# Patient Record
Sex: MALE | Race: WHITE | NOT HISPANIC OR LATINO | Employment: OTHER | URBAN - METROPOLITAN AREA
[De-identification: names, ages, dates, MRNs, and addresses within clinical notes are randomized per-mention and may not be internally consistent; named-entity substitution may affect disease eponyms.]

---

## 2018-06-19 PROBLEM — F10.90 ALCOHOL USE DISORDER: Status: ACTIVE | Noted: 2018-06-19

## 2018-06-19 PROBLEM — F17.200 CURRENT EVERY DAY SMOKER: Status: ACTIVE | Noted: 2018-06-19

## 2018-06-19 PROBLEM — N52.9 ERECTILE DYSFUNCTION: Status: ACTIVE | Noted: 2018-06-19

## 2018-06-19 PROBLEM — R03.0 PREHYPERTENSION: Status: ACTIVE | Noted: 2018-06-19

## 2018-06-19 PROBLEM — R35.1 NOCTURIA: Status: ACTIVE | Noted: 2018-06-19

## 2018-07-24 PROBLEM — R35.1 NOCTURIA: Status: RESOLVED | Noted: 2018-06-19 | Resolved: 2018-07-24

## 2018-07-24 PROBLEM — I10 ESSENTIAL HYPERTENSION: Status: ACTIVE | Noted: 2018-06-19

## 2018-09-06 PROBLEM — E83.52 HYPERCALCEMIA: Status: ACTIVE | Noted: 2018-09-06

## 2018-09-06 PROBLEM — E78.2 MIXED HYPERLIPIDEMIA: Status: ACTIVE | Noted: 2018-09-06

## 2018-09-06 PROBLEM — D75.1 POLYCYTHEMIA: Status: ACTIVE | Noted: 2018-09-06

## 2020-08-13 PROBLEM — F10.20 ALCOHOL USE DISORDER, MODERATE, IN CONTROLLED ENVIRONMENT (HCC): Status: ACTIVE | Noted: 2018-06-19

## 2020-08-21 PROBLEM — E83.52 HYPERCALCEMIA: Status: RESOLVED | Noted: 2018-09-06 | Resolved: 2020-08-21

## 2021-03-12 ENCOUNTER — HOSPITAL ENCOUNTER (OUTPATIENT)
Dept: RADIOLOGY | Facility: MEDICAL CENTER | Age: 61
End: 2021-03-12
Payer: OTHER MISCELLANEOUS

## 2021-03-12 ENCOUNTER — HOSPITAL ENCOUNTER (INPATIENT)
Facility: MEDICAL CENTER | Age: 61
LOS: 8 days | DRG: 253 | End: 2021-03-20
Attending: EMERGENCY MEDICINE | Admitting: HOSPITALIST
Payer: OTHER MISCELLANEOUS

## 2021-03-12 DIAGNOSIS — I70.90 ARTERIAL OCCLUSION: Primary | ICD-10-CM

## 2021-03-12 DIAGNOSIS — M79.673 CHRONIC FOOT PAIN, UNSPECIFIED LATERALITY: ICD-10-CM

## 2021-03-12 DIAGNOSIS — S91.332A PENETRATING WOUND OF LEFT FOOT, INITIAL ENCOUNTER: ICD-10-CM

## 2021-03-12 DIAGNOSIS — G89.29 CHRONIC FOOT PAIN, UNSPECIFIED LATERALITY: ICD-10-CM

## 2021-03-12 PROBLEM — I70.209 FEMORAL ARTERY OCCLUSION (HCC): Status: ACTIVE | Noted: 2021-03-12

## 2021-03-12 LAB
ALBUMIN SERPL BCP-MCNC: 3.3 G/DL (ref 3.2–4.9)
ALBUMIN/GLOB SERPL: 1 G/DL
ALP SERPL-CCNC: 79 U/L (ref 30–99)
ALT SERPL-CCNC: 14 U/L (ref 2–50)
ANION GAP SERPL CALC-SCNC: 8 MMOL/L (ref 7–16)
APTT PPP: 96.5 SEC (ref 24.7–36)
AST SERPL-CCNC: 17 U/L (ref 12–45)
BASOPHILS # BLD AUTO: 0.7 % (ref 0–1.8)
BASOPHILS # BLD: 0.07 K/UL (ref 0–0.12)
BILIRUB SERPL-MCNC: 0.6 MG/DL (ref 0.1–1.5)
BUN SERPL-MCNC: 9 MG/DL (ref 8–22)
CALCIUM SERPL-MCNC: 10.2 MG/DL (ref 8.5–10.5)
CHLORIDE SERPL-SCNC: 101 MMOL/L (ref 96–112)
CO2 SERPL-SCNC: 25 MMOL/L (ref 20–33)
CREAT SERPL-MCNC: 0.58 MG/DL (ref 0.5–1.4)
EKG IMPRESSION: NORMAL
EOSINOPHIL # BLD AUTO: 0.14 K/UL (ref 0–0.51)
EOSINOPHIL NFR BLD: 1.3 % (ref 0–6.9)
ERYTHROCYTE [DISTWIDTH] IN BLOOD BY AUTOMATED COUNT: 45.9 FL (ref 35.9–50)
GLOBULIN SER CALC-MCNC: 3.2 G/DL (ref 1.9–3.5)
GLUCOSE SERPL-MCNC: 97 MG/DL (ref 65–99)
HCT VFR BLD AUTO: 49.7 % (ref 42–52)
HGB BLD-MCNC: 16.3 G/DL (ref 14–18)
IMM GRANULOCYTES # BLD AUTO: 0.13 K/UL (ref 0–0.11)
IMM GRANULOCYTES NFR BLD AUTO: 1.2 % (ref 0–0.9)
INR PPP: 0.98 (ref 0.87–1.13)
LYMPHOCYTES # BLD AUTO: 1.94 K/UL (ref 1–4.8)
LYMPHOCYTES NFR BLD: 18.3 % (ref 22–41)
MCH RBC QN AUTO: 31.2 PG (ref 27–33)
MCHC RBC AUTO-ENTMCNC: 32.8 G/DL (ref 33.7–35.3)
MCV RBC AUTO: 95 FL (ref 81.4–97.8)
MONOCYTES # BLD AUTO: 0.98 K/UL (ref 0–0.85)
MONOCYTES NFR BLD AUTO: 9.2 % (ref 0–13.4)
NEUTROPHILS # BLD AUTO: 7.35 K/UL (ref 1.82–7.42)
NEUTROPHILS NFR BLD: 69.3 % (ref 44–72)
NRBC # BLD AUTO: 0 K/UL
NRBC BLD-RTO: 0 /100 WBC
PLATELET # BLD AUTO: 297 K/UL (ref 164–446)
PMV BLD AUTO: 9.4 FL (ref 9–12.9)
POTASSIUM SERPL-SCNC: 4.2 MMOL/L (ref 3.6–5.5)
PROT SERPL-MCNC: 6.5 G/DL (ref 6–8.2)
PROTHROMBIN TIME: 13.3 SEC (ref 12–14.6)
RBC # BLD AUTO: 5.23 M/UL (ref 4.7–6.1)
SARS-COV+SARS-COV-2 AG RESP QL IA.RAPID: NOTDETECTED
SARS-COV-2 RNA RESP QL NAA+PROBE: NOTDETECTED
SODIUM SERPL-SCNC: 134 MMOL/L (ref 135–145)
SPECIMEN SOURCE: NORMAL
SPECIMEN SOURCE: NORMAL
UFH PPP CHRO-ACNC: 0.25 IU/ML
UFH PPP CHRO-ACNC: 0.46 IU/ML
WBC # BLD AUTO: 10.6 K/UL (ref 4.8–10.8)

## 2021-03-12 PROCEDURE — U0005 INFEC AGEN DETEC AMPLI PROBE: HCPCS

## 2021-03-12 PROCEDURE — 700111 HCHG RX REV CODE 636 W/ 250 OVERRIDE (IP): Performed by: EMERGENCY MEDICINE

## 2021-03-12 PROCEDURE — 36415 COLL VENOUS BLD VENIPUNCTURE: CPT

## 2021-03-12 PROCEDURE — 85520 HEPARIN ASSAY: CPT | Mod: 91

## 2021-03-12 PROCEDURE — A9270 NON-COVERED ITEM OR SERVICE: HCPCS | Performed by: HOSPITALIST

## 2021-03-12 PROCEDURE — 87426 SARSCOV CORONAVIRUS AG IA: CPT

## 2021-03-12 PROCEDURE — C9803 HOPD COVID-19 SPEC COLLECT: HCPCS | Performed by: EMERGENCY MEDICINE

## 2021-03-12 PROCEDURE — 96365 THER/PROPH/DIAG IV INF INIT: CPT

## 2021-03-12 PROCEDURE — 700105 HCHG RX REV CODE 258: Performed by: HOSPITALIST

## 2021-03-12 PROCEDURE — 93005 ELECTROCARDIOGRAM TRACING: CPT | Performed by: EMERGENCY MEDICINE

## 2021-03-12 PROCEDURE — 99223 1ST HOSP IP/OBS HIGH 75: CPT | Performed by: HOSPITALIST

## 2021-03-12 PROCEDURE — 99285 EMERGENCY DEPT VISIT HI MDM: CPT

## 2021-03-12 PROCEDURE — 85730 THROMBOPLASTIN TIME PARTIAL: CPT

## 2021-03-12 PROCEDURE — 80053 COMPREHEN METABOLIC PANEL: CPT

## 2021-03-12 PROCEDURE — 96366 THER/PROPH/DIAG IV INF ADDON: CPT

## 2021-03-12 PROCEDURE — 770006 HCHG ROOM/CARE - MED/SURG/GYN SEMI*

## 2021-03-12 PROCEDURE — 85610 PROTHROMBIN TIME: CPT

## 2021-03-12 PROCEDURE — 85025 COMPLETE CBC W/AUTO DIFF WBC: CPT

## 2021-03-12 PROCEDURE — U0003 INFECTIOUS AGENT DETECTION BY NUCLEIC ACID (DNA OR RNA); SEVERE ACUTE RESPIRATORY SYNDROME CORONAVIRUS 2 (SARS-COV-2) (CORONAVIRUS DISEASE [COVID-19]), AMPLIFIED PROBE TECHNIQUE, MAKING USE OF HIGH THROUGHPUT TECHNOLOGIES AS DESCRIBED BY CMS-2020-01-R: HCPCS

## 2021-03-12 PROCEDURE — 700102 HCHG RX REV CODE 250 W/ 637 OVERRIDE(OP): Performed by: HOSPITALIST

## 2021-03-12 RX ORDER — OXYCODONE HYDROCHLORIDE 5 MG/1
5 TABLET ORAL
Status: DISCONTINUED | OUTPATIENT
Start: 2021-03-12 | End: 2021-03-13

## 2021-03-12 RX ORDER — POLYETHYLENE GLYCOL 3350 17 G/17G
1 POWDER, FOR SOLUTION ORAL
Status: DISCONTINUED | OUTPATIENT
Start: 2021-03-12 | End: 2021-03-17

## 2021-03-12 RX ORDER — BISACODYL 10 MG
10 SUPPOSITORY, RECTAL RECTAL
Status: DISCONTINUED | OUTPATIENT
Start: 2021-03-12 | End: 2021-03-17

## 2021-03-12 RX ORDER — OXYCODONE HYDROCHLORIDE 10 MG/1
10 TABLET ORAL
Status: DISCONTINUED | OUTPATIENT
Start: 2021-03-12 | End: 2021-03-13

## 2021-03-12 RX ORDER — ACETAMINOPHEN 325 MG/1
650 TABLET ORAL EVERY 6 HOURS PRN
Status: DISCONTINUED | OUTPATIENT
Start: 2021-03-12 | End: 2021-03-13

## 2021-03-12 RX ORDER — NICOTINE 21 MG/24HR
21 PATCH, TRANSDERMAL 24 HOURS TRANSDERMAL
Status: DISCONTINUED | OUTPATIENT
Start: 2021-03-12 | End: 2021-03-20 | Stop reason: HOSPADM

## 2021-03-12 RX ORDER — HEPARIN SODIUM 1000 [USP'U]/ML
40 INJECTION, SOLUTION INTRAVENOUS; SUBCUTANEOUS PRN
Status: DISCONTINUED | OUTPATIENT
Start: 2021-03-12 | End: 2021-03-13

## 2021-03-12 RX ORDER — HEPARIN SODIUM 5000 [USP'U]/100ML
0-30 INJECTION, SOLUTION INTRAVENOUS CONTINUOUS
Status: DISCONTINUED | OUTPATIENT
Start: 2021-03-12 | End: 2021-03-13

## 2021-03-12 RX ORDER — CEPHALEXIN 500 MG/1
500 CAPSULE ORAL 4 TIMES DAILY
Status: ON HOLD | COMMUNITY
End: 2021-03-20

## 2021-03-12 RX ORDER — NAPROXEN SODIUM 220 MG
440 TABLET ORAL 2 TIMES DAILY PRN
COMMUNITY
End: 2021-09-15

## 2021-03-12 RX ORDER — PROMETHAZINE HYDROCHLORIDE 25 MG/1
12.5-25 TABLET ORAL EVERY 4 HOURS PRN
Status: DISCONTINUED | OUTPATIENT
Start: 2021-03-12 | End: 2021-03-20 | Stop reason: HOSPADM

## 2021-03-12 RX ORDER — PROCHLORPERAZINE EDISYLATE 5 MG/ML
5-10 INJECTION INTRAMUSCULAR; INTRAVENOUS EVERY 4 HOURS PRN
Status: DISCONTINUED | OUTPATIENT
Start: 2021-03-12 | End: 2021-03-20 | Stop reason: HOSPADM

## 2021-03-12 RX ORDER — MORPHINE SULFATE 4 MG/ML
4 INJECTION, SOLUTION INTRAMUSCULAR; INTRAVENOUS
Status: DISCONTINUED | OUTPATIENT
Start: 2021-03-12 | End: 2021-03-13

## 2021-03-12 RX ORDER — LISINOPRIL 10 MG/1
10 TABLET ORAL
Status: DISCONTINUED | OUTPATIENT
Start: 2021-03-12 | End: 2021-03-12

## 2021-03-12 RX ORDER — SODIUM CHLORIDE, SODIUM LACTATE, POTASSIUM CHLORIDE, CALCIUM CHLORIDE 600; 310; 30; 20 MG/100ML; MG/100ML; MG/100ML; MG/100ML
INJECTION, SOLUTION INTRAVENOUS CONTINUOUS
Status: DISCONTINUED | OUTPATIENT
Start: 2021-03-12 | End: 2021-03-20

## 2021-03-12 RX ORDER — IBUPROFEN 200 MG
600-800 TABLET ORAL EVERY 6 HOURS PRN
COMMUNITY
End: 2021-09-15 | Stop reason: SDUPTHER

## 2021-03-12 RX ORDER — ONDANSETRON 4 MG/1
4 TABLET, ORALLY DISINTEGRATING ORAL EVERY 4 HOURS PRN
Status: DISCONTINUED | OUTPATIENT
Start: 2021-03-12 | End: 2021-03-20 | Stop reason: HOSPADM

## 2021-03-12 RX ORDER — AMOXICILLIN 250 MG
2 CAPSULE ORAL 2 TIMES DAILY
Status: DISCONTINUED | OUTPATIENT
Start: 2021-03-13 | End: 2021-03-17

## 2021-03-12 RX ORDER — ONDANSETRON 2 MG/ML
4 INJECTION INTRAMUSCULAR; INTRAVENOUS EVERY 4 HOURS PRN
Status: DISCONTINUED | OUTPATIENT
Start: 2021-03-12 | End: 2021-03-13

## 2021-03-12 RX ORDER — PROMETHAZINE HYDROCHLORIDE 25 MG/1
12.5-25 SUPPOSITORY RECTAL EVERY 4 HOURS PRN
Status: DISCONTINUED | OUTPATIENT
Start: 2021-03-12 | End: 2021-03-20 | Stop reason: HOSPADM

## 2021-03-12 RX ORDER — LABETALOL HYDROCHLORIDE 5 MG/ML
10 INJECTION, SOLUTION INTRAVENOUS EVERY 4 HOURS PRN
Status: DISCONTINUED | OUTPATIENT
Start: 2021-03-12 | End: 2021-03-20 | Stop reason: HOSPADM

## 2021-03-12 RX ORDER — MULTIVIT WITH MINERALS/LUTEIN
1 TABLET ORAL DAILY
Status: DISCONTINUED | OUTPATIENT
Start: 2021-03-12 | End: 2021-03-12

## 2021-03-12 RX ORDER — GABAPENTIN 300 MG/1
300 CAPSULE ORAL
Status: DISCONTINUED | OUTPATIENT
Start: 2021-03-12 | End: 2021-03-18

## 2021-03-12 RX ADMIN — OXYCODONE HYDROCHLORIDE 10 MG: 10 TABLET ORAL at 20:04

## 2021-03-12 RX ADMIN — SODIUM CHLORIDE, POTASSIUM CHLORIDE, SODIUM LACTATE AND CALCIUM CHLORIDE: 600; 310; 30; 20 INJECTION, SOLUTION INTRAVENOUS at 17:58

## 2021-03-12 RX ADMIN — HEPARIN SODIUM 3900 UNITS: 1000 INJECTION, SOLUTION INTRAVENOUS; SUBCUTANEOUS at 22:41

## 2021-03-12 RX ADMIN — GABAPENTIN 300 MG: 300 CAPSULE ORAL at 20:04

## 2021-03-12 RX ADMIN — ACETAMINOPHEN 650 MG: 325 TABLET, FILM COATED ORAL at 20:04

## 2021-03-12 RX ADMIN — HEPARIN SODIUM 18 UNITS/KG/HR: 5000 INJECTION, SOLUTION INTRAVENOUS at 15:34

## 2021-03-12 RX ADMIN — OXYCODONE 5 MG: 5 TABLET ORAL at 17:06

## 2021-03-12 ASSESSMENT — LIFESTYLE VARIABLES
TOTAL SCORE: 0
HAVE PEOPLE ANNOYED YOU BY CRITICIZING YOUR DRINKING: NO
DOES PATIENT WANT TO STOP DRINKING: NO
HAVE YOU EVER FELT YOU SHOULD CUT DOWN ON YOUR DRINKING: NO
TOTAL SCORE: 0
TOTAL SCORE: 0
HOW MANY TIMES IN THE PAST YEAR HAVE YOU HAD 5 OR MORE DRINKS IN A DAY: 25
ALCOHOL_USE: YES
ON A TYPICAL DAY WHEN YOU DRINK ALCOHOL HOW MANY DRINKS DO YOU HAVE: 4
CONSUMPTION TOTAL: POSITIVE
AVERAGE NUMBER OF DAYS PER WEEK YOU HAVE A DRINK CONTAINING ALCOHOL: 5
EVER FELT BAD OR GUILTY ABOUT YOUR DRINKING: NO
EVER HAD A DRINK FIRST THING IN THE MORNING TO STEADY YOUR NERVES TO GET RID OF A HANGOVER: NO

## 2021-03-12 ASSESSMENT — ENCOUNTER SYMPTOMS
ABDOMINAL PAIN: 0
CHILLS: 0
FEVER: 0
BLURRED VISION: 0
DIARRHEA: 0
VOMITING: 0
PALPITATIONS: 0
COUGH: 0
HEADACHES: 0
SHORTNESS OF BREATH: 0
NAUSEA: 0
LOSS OF CONSCIOUSNESS: 0
BACK PAIN: 0
SORE THROAT: 0
DOUBLE VISION: 0
DIZZINESS: 0

## 2021-03-12 ASSESSMENT — COGNITIVE AND FUNCTIONAL STATUS - GENERAL
DAILY ACTIVITIY SCORE: 18
DRESSING REGULAR LOWER BODY CLOTHING: A LITTLE
CLIMB 3 TO 5 STEPS WITH RAILING: A LITTLE
MOVING TO AND FROM BED TO CHAIR: A LITTLE
MOBILITY SCORE: 18
SUGGESTED CMS G CODE MODIFIER MOBILITY: CK
PERSONAL GROOMING: A LITTLE
HELP NEEDED FOR BATHING: A LITTLE
DRESSING REGULAR UPPER BODY CLOTHING: A LITTLE
EATING MEALS: A LITTLE
TOILETING: A LITTLE
TURNING FROM BACK TO SIDE WHILE IN FLAT BAD: A LITTLE
MOVING FROM LYING ON BACK TO SITTING ON SIDE OF FLAT BED: A LITTLE
SUGGESTED CMS G CODE MODIFIER DAILY ACTIVITY: CK
WALKING IN HOSPITAL ROOM: A LITTLE
STANDING UP FROM CHAIR USING ARMS: A LITTLE

## 2021-03-12 ASSESSMENT — PAIN DESCRIPTION - PAIN TYPE: TYPE: ACUTE PAIN

## 2021-03-12 ASSESSMENT — PATIENT HEALTH QUESTIONNAIRE - PHQ9
1. LITTLE INTEREST OR PLEASURE IN DOING THINGS: NOT AT ALL
2. FEELING DOWN, DEPRESSED, IRRITABLE, OR HOPELESS: NOT AT ALL
SUM OF ALL RESPONSES TO PHQ9 QUESTIONS 1 AND 2: 0

## 2021-03-12 ASSESSMENT — FIBROSIS 4 INDEX: FIB4 SCORE: 1.11

## 2021-03-12 NOTE — ED PROVIDER NOTES
ED Provider Note    CHIEF COMPLAINT  Chief Complaint   Patient presents with   • Leg Swelling     Transfer from [facility] for L LE clot.  Leg is discolored and swollen x 1 week, pain with palpation.  U/S showed L arterial occlusion Arrived on heparin gtt.   • Open Wound     L lateral foot 1x1 wound, macerated edges.       FAUSTINO Cantu is a 60 y.o. male who presents with a leg ischemia.  The patient is for started having some discomfort about a week and a half ago.  It was sore, uncomfortable, is gotten progressively more painful, swollen, discolored, have all gotten worse as time is gone on.  He went to the outside hospital, there he was found to have on sonogram occlusion of the posterior tibial, superficial femoral and popliteal arteries.  He was started on heparin and transferred here for vascular surgery consultation.  He denies any cough or cold symptoms.  No exposure to sick contact specifically COVID-19.  There is no other complaint.    PAST MEDICAL HISTORY  Past Medical History:   Diagnosis Date   • Adenomatous colon polyp 11/2018    multiple, recall colonoscopy in 3 years   • BPH with obstruction/lower urinary tract symptoms    • Cervical spine fracture (HCC)    • Chronic bronchitis (HCC)    • Current every day smoker 6/19/2018   • Erectile dysfunction 6/19/2018   • Hammer toe    • Herpes    • Hypertension    • Insomnia    • Mixed hyperlipidemia 9/6/2018   • Polycythemia 9/6/2018   • Prehypertension 6/19/2018   • Seasonal allergic rhinitis    • Sleep apnea        FAMILY HISTORY  Family History   Problem Relation Age of Onset   • Arthritis Other    • Diabetes Mother    • Cancer Father         colon or prostate    • Lung Disease Father         emphysema    • Diabetes Sister    • Diabetes Brother    • Diabetes Maternal Grandmother    • Diabetes Paternal Grandfather    • No Known Problems Daughter    • No Known Problems Daughter    • No Known Problems Grandchild    • No Known Problems Grandchild        SOCIAL  "HISTORY  Social History     Tobacco Use   • Smoking status: Current Every Day Smoker     Packs/day: 1.00     Years: 45.00     Pack years: 45.00     Types: Cigarettes   • Smokeless tobacco: Never Used   • Tobacco comment: 20 cig/day   Substance Use Topics   • Alcohol use: Yes     Alcohol/week: 3.0 - 3.6 oz     Types: 5 - 6 Cans of beer per week     Comment: 1-6 beers/day    • Drug use: Yes     Types: Marijuana, Inhaled     Comment: Occ         SURGICAL HISTORY  Past Surgical History:   Procedure Laterality Date   • HAMMERTOE CORRECTION         CURRENT MEDICATIONS  Home Medications     Reviewed by Christine Abdalla R.N. (Registered Nurse) on 03/12/21 at 1513  Med List Status: Partial   Medication Last Dose Status   gabapentin (NEURONTIN) 300 MG Cap 3/12/2021 Active   MAGNESIUM PO  Active   Misc Natural Products (PROSTATE) Cap  Active   Multiple Vitamins-Minerals (CENTRUM SILVER PO)  Active   Multiple Vitamins-Minerals (ZINC PO)  Active   tadalafil (CIALIS) 20 MG tablet  Active   Turmeric 450 MG Cap  Active                I have reviewed the nurses notes and/or the list brought with the patient.    ALLERGIES  No Known Allergies    REVIEW OF SYSTEMS  See HPI for further details. Review of systems as above, otherwise all other systems are negative.     PHYSICAL EXAM  VITAL SIGNS: /76   Pulse 67   Temp 37 °C (98.6 °F) (Temporal)   Resp 16   Ht 1.956 m (6' 5\")   Wt 97.5 kg (215 lb)   SpO2 94%   BMI 25.50 kg/m²     Constitutional: Well appearing patient in no acute distress.  Not toxic, nor ill in appearance.  HENT: Mucus membranes moist.  Oropharynx is clear.  Eyes: Pupils equally round.  No scleral icterus.   Neck: Full nontender range of motion.  Lymphatic: No cervical lymphadenopathy noted.   Cardiovascular: Regular heart rate and rhythm.  No murmurs, rubs, nor gallop appreciated.   Thorax & Lungs: Chest is nontender.  Lungs are clear to auscultation with good air movement bilaterally.  No wheeze, rhonchi, " nor rales.   Abdomen: Soft, with no tenderness, rebound nor guarding.  No mass, pulsatile mass, nor hepatosplenomegaly appreciated.  Skin: No purpura nor petechia noted.  See below  Extremities/Musculoskeletal: The left foot is cold to the touch, edematous and violaceous.  In particular he has a deeper violaceous coloration of the great toe as well as the fourth and fifth toes.  I do not feel a pulse.  Neurologic: Alert & oriented.  Strength and sensation is intact all around.  Gait is not assessed  Psychiatric: Normal affect appropriate for the clinical situation.     EKG  I interpreted this EKG myself.  This is a 12-lead study.  The rhythm is sinus with a rate of 66.  There are no ST segment nor T wave abnormalities.  Interpretation: No ST segment elevation myocardial infarction.    LABS  Labs were ordered    RADIOLOGY/PROCEDURES  Noted above    MEDICAL RECORD  I have reviewed patient's medical record and pertinent results are listed above.    COURSE & MEDICAL DECISION MAKING  I have reviewed any medical record information, laboratory studies and radiographic results as noted above.  This patient presents with a week and a half of what appears to be arterial occlusion in the left lower extremity.  He was started on heparin, we will continue that.  CT scan at the outside facility was performed, I am getting that loaded up so that our vascular surgeon, Dr. Cameron, with whom I have spoken will be able to review it to help guide management.  In the interim, I spoke with Dr. Frazier, who be seeing him to admit.  We discussed smoking cessation.  I offered him a nicotine patch which he has declined at this point.      FINAL IMPRESSION  1. Arterial occlusion           This dictation was created using voice recognition software.    Electronically signed by: Elio Mitchell M.D., 3/12/2021 3:48 PM

## 2021-03-12 NOTE — ED TRIAGE NOTES
Chief Complaint   Patient presents with   • Leg Swelling     Transfer from [facility] for L LE clot.  Leg is discolored and swollen x 1 week, pain with palpation.  U/S showed L arterial occlusion Arrived on heparin gtt.   • Open Wound     L lateral foot 1x1 wound, macerated edges.     BIBA to room.  Agree with triage assessment.  Changing into gown.  Chart placed for ERP eval.

## 2021-03-13 ENCOUNTER — APPOINTMENT (OUTPATIENT)
Dept: RADIOLOGY | Facility: MEDICAL CENTER | Age: 61
DRG: 253 | End: 2021-03-13
Attending: SURGERY
Payer: OTHER MISCELLANEOUS

## 2021-03-13 ENCOUNTER — ANESTHESIA EVENT (OUTPATIENT)
Dept: SURGERY | Facility: MEDICAL CENTER | Age: 61
DRG: 253 | End: 2021-03-13
Payer: OTHER MISCELLANEOUS

## 2021-03-13 LAB
ABO + RH BLD: NORMAL
ABO GROUP BLD: NORMAL
ANION GAP SERPL CALC-SCNC: 12 MMOL/L (ref 7–16)
BLD GP AB SCN SERPL QL: NORMAL
BUN SERPL-MCNC: 9 MG/DL (ref 8–22)
CALCIUM SERPL-MCNC: 10.1 MG/DL (ref 8.5–10.5)
CHLORIDE SERPL-SCNC: 98 MMOL/L (ref 96–112)
CHOLEST SERPL-MCNC: 153 MG/DL (ref 100–199)
CO2 SERPL-SCNC: 25 MMOL/L (ref 20–33)
CREAT SERPL-MCNC: 0.57 MG/DL (ref 0.5–1.4)
ERYTHROCYTE [DISTWIDTH] IN BLOOD BY AUTOMATED COUNT: 45 FL (ref 35.9–50)
GLUCOSE SERPL-MCNC: 100 MG/DL (ref 65–99)
HCT VFR BLD AUTO: 51 % (ref 42–52)
HDLC SERPL-MCNC: 48 MG/DL
HGB BLD-MCNC: 16.7 G/DL (ref 14–18)
LDLC SERPL CALC-MCNC: 88 MG/DL
MCH RBC QN AUTO: 30.8 PG (ref 27–33)
MCHC RBC AUTO-ENTMCNC: 32.7 G/DL (ref 33.7–35.3)
MCV RBC AUTO: 93.9 FL (ref 81.4–97.8)
PLATELET # BLD AUTO: 267 K/UL (ref 164–446)
PMV BLD AUTO: 8.8 FL (ref 9–12.9)
POTASSIUM SERPL-SCNC: 4.1 MMOL/L (ref 3.6–5.5)
RBC # BLD AUTO: 5.43 M/UL (ref 4.7–6.1)
RH BLD: NORMAL
SODIUM SERPL-SCNC: 135 MMOL/L (ref 135–145)
TRIGL SERPL-MCNC: 86 MG/DL (ref 0–149)
UFH PPP CHRO-ACNC: 0.25 IU/ML
UFH PPP CHRO-ACNC: 0.44 IU/ML
UFH PPP CHRO-ACNC: 0.6 IU/ML
WBC # BLD AUTO: 10.2 K/UL (ref 4.8–10.8)

## 2021-03-13 PROCEDURE — 160009 HCHG ANES TIME/MIN: Performed by: SURGERY

## 2021-03-13 PROCEDURE — 86901 BLOOD TYPING SEROLOGIC RH(D): CPT

## 2021-03-13 PROCEDURE — 06WY07Z REVISION OF AUTOLOGOUS TISSUE SUBSTITUTE IN LOWER VEIN, OPEN APPROACH: ICD-10-PCS | Performed by: SURGERY

## 2021-03-13 PROCEDURE — A9270 NON-COVERED ITEM OR SERVICE: HCPCS | Performed by: ANESTHESIOLOGY

## 2021-03-13 PROCEDURE — C1725 CATH, TRANSLUMIN NON-LASER: HCPCS | Performed by: SURGERY

## 2021-03-13 PROCEDURE — 110454 HCHG SHELL REV 250: Performed by: SURGERY

## 2021-03-13 PROCEDURE — 700105 HCHG RX REV CODE 258: Performed by: SURGERY

## 2021-03-13 PROCEDURE — 160029 HCHG SURGERY MINUTES - 1ST 30 MINS LEVEL 4: Performed by: SURGERY

## 2021-03-13 PROCEDURE — 99406 BEHAV CHNG SMOKING 3-10 MIN: CPT

## 2021-03-13 PROCEDURE — 700102 HCHG RX REV CODE 250 W/ 637 OVERRIDE(OP): Performed by: SURGERY

## 2021-03-13 PROCEDURE — 700102 HCHG RX REV CODE 250 W/ 637 OVERRIDE(OP): Performed by: ANESTHESIOLOGY

## 2021-03-13 PROCEDURE — C1769 GUIDE WIRE: HCPCS | Performed by: SURGERY

## 2021-03-13 PROCEDURE — 700111 HCHG RX REV CODE 636 W/ 250 OVERRIDE (IP)

## 2021-03-13 PROCEDURE — C1757 CATH, THROMBECTOMY/EMBOLECT: HCPCS | Performed by: SURGERY

## 2021-03-13 PROCEDURE — 85027 COMPLETE CBC AUTOMATED: CPT

## 2021-03-13 PROCEDURE — A9270 NON-COVERED ITEM OR SERVICE: HCPCS | Performed by: HOSPITALIST

## 2021-03-13 PROCEDURE — B41GZZZ FLUOROSCOPY OF LEFT LOWER EXTREMITY ARTERIES: ICD-10-PCS | Performed by: SURGERY

## 2021-03-13 PROCEDURE — 80048 BASIC METABOLIC PNL TOTAL CA: CPT

## 2021-03-13 PROCEDURE — 501837 HCHG SUTURE CV: Performed by: SURGERY

## 2021-03-13 PROCEDURE — 700101 HCHG RX REV CODE 250

## 2021-03-13 PROCEDURE — 85520 HEPARIN ASSAY: CPT | Mod: 91

## 2021-03-13 PROCEDURE — 160036 HCHG PACU - EA ADDL 30 MINS PHASE I: Performed by: SURGERY

## 2021-03-13 PROCEDURE — 041L09L BYPASS LEFT FEMORAL ARTERY TO POPLITEAL ARTERY WITH AUTOLOGOUS VENOUS TISSUE, OPEN APPROACH: ICD-10-PCS | Performed by: SURGERY

## 2021-03-13 PROCEDURE — 501838 HCHG SUTURE GENERAL: Performed by: SURGERY

## 2021-03-13 PROCEDURE — 700111 HCHG RX REV CODE 636 W/ 250 OVERRIDE (IP): Performed by: EMERGENCY MEDICINE

## 2021-03-13 PROCEDURE — A9270 NON-COVERED ITEM OR SERVICE: HCPCS | Performed by: SURGERY

## 2021-03-13 PROCEDURE — 700117 HCHG RX CONTRAST REV CODE 255: Performed by: SURGERY

## 2021-03-13 PROCEDURE — 700111 HCHG RX REV CODE 636 W/ 250 OVERRIDE (IP): Performed by: ANESTHESIOLOGY

## 2021-03-13 PROCEDURE — 770006 HCHG ROOM/CARE - MED/SURG/GYN SEMI*

## 2021-03-13 PROCEDURE — 160035 HCHG PACU - 1ST 60 MINS PHASE I: Performed by: SURGERY

## 2021-03-13 PROCEDURE — 700105 HCHG RX REV CODE 258: Performed by: ANESTHESIOLOGY

## 2021-03-13 PROCEDURE — 160041 HCHG SURGERY MINUTES - EA ADDL 1 MIN LEVEL 4: Performed by: SURGERY

## 2021-03-13 PROCEDURE — 160048 HCHG OR STATISTICAL LEVEL 1-5: Performed by: SURGERY

## 2021-03-13 PROCEDURE — 0JBR0ZZ EXCISION OF LEFT FOOT SUBCUTANEOUS TISSUE AND FASCIA, OPEN APPROACH: ICD-10-PCS | Performed by: SURGERY

## 2021-03-13 PROCEDURE — 700101 HCHG RX REV CODE 250: Performed by: SURGERY

## 2021-03-13 PROCEDURE — 86850 RBC ANTIBODY SCREEN: CPT

## 2021-03-13 PROCEDURE — 99233 SBSQ HOSP IP/OBS HIGH 50: CPT | Performed by: INTERNAL MEDICINE

## 2021-03-13 PROCEDURE — C1894 INTRO/SHEATH, NON-LASER: HCPCS | Performed by: SURGERY

## 2021-03-13 PROCEDURE — 700101 HCHG RX REV CODE 250: Performed by: ANESTHESIOLOGY

## 2021-03-13 PROCEDURE — 80061 LIPID PANEL: CPT

## 2021-03-13 PROCEDURE — 700102 HCHG RX REV CODE 250 W/ 637 OVERRIDE(OP): Performed by: HOSPITALIST

## 2021-03-13 PROCEDURE — 160002 HCHG RECOVERY MINUTES (STAT): Performed by: SURGERY

## 2021-03-13 PROCEDURE — 700111 HCHG RX REV CODE 636 W/ 250 OVERRIDE (IP): Performed by: SURGERY

## 2021-03-13 PROCEDURE — 36415 COLL VENOUS BLD VENIPUNCTURE: CPT

## 2021-03-13 PROCEDURE — 86900 BLOOD TYPING SEROLOGIC ABO: CPT

## 2021-03-13 RX ORDER — DEXAMETHASONE SODIUM PHOSPHATE 4 MG/ML
4 INJECTION, SOLUTION INTRA-ARTICULAR; INTRALESIONAL; INTRAMUSCULAR; INTRAVENOUS; SOFT TISSUE
Status: DISCONTINUED | OUTPATIENT
Start: 2021-03-13 | End: 2021-03-20 | Stop reason: HOSPADM

## 2021-03-13 RX ORDER — LIDOCAINE HYDROCHLORIDE 20 MG/ML
INJECTION, SOLUTION EPIDURAL; INFILTRATION; INTRACAUDAL; PERINEURAL PRN
Status: DISCONTINUED | OUTPATIENT
Start: 2021-03-13 | End: 2021-03-13 | Stop reason: SURG

## 2021-03-13 RX ORDER — LABETALOL HYDROCHLORIDE 5 MG/ML
5 INJECTION, SOLUTION INTRAVENOUS
Status: DISCONTINUED | OUTPATIENT
Start: 2021-03-13 | End: 2021-03-13 | Stop reason: HOSPADM

## 2021-03-13 RX ORDER — DIPHENHYDRAMINE HYDROCHLORIDE 50 MG/ML
12.5 INJECTION INTRAMUSCULAR; INTRAVENOUS
Status: DISCONTINUED | OUTPATIENT
Start: 2021-03-13 | End: 2021-03-13 | Stop reason: HOSPADM

## 2021-03-13 RX ORDER — IBUPROFEN 800 MG/1
800 TABLET ORAL 3 TIMES DAILY PRN
Status: DISCONTINUED | OUTPATIENT
Start: 2021-03-17 | End: 2021-03-15

## 2021-03-13 RX ORDER — ROCURONIUM BROMIDE 10 MG/ML
INJECTION, SOLUTION INTRAVENOUS PRN
Status: DISCONTINUED | OUTPATIENT
Start: 2021-03-13 | End: 2021-03-13 | Stop reason: SURG

## 2021-03-13 RX ORDER — PHENYLEPHRINE HCL IN 0.9% NACL 0.5 MG/5ML
SYRINGE (ML) INTRAVENOUS PRN
Status: DISCONTINUED | OUTPATIENT
Start: 2021-03-13 | End: 2021-03-13 | Stop reason: SURG

## 2021-03-13 RX ORDER — CEFAZOLIN SODIUM 1 G/3ML
INJECTION, POWDER, FOR SOLUTION INTRAMUSCULAR; INTRAVENOUS PRN
Status: DISCONTINUED | OUTPATIENT
Start: 2021-03-13 | End: 2021-03-13 | Stop reason: SURG

## 2021-03-13 RX ORDER — METOPROLOL TARTRATE 1 MG/ML
1 INJECTION, SOLUTION INTRAVENOUS
Status: DISCONTINUED | OUTPATIENT
Start: 2021-03-13 | End: 2021-03-13 | Stop reason: HOSPADM

## 2021-03-13 RX ORDER — SODIUM CHLORIDE, SODIUM LACTATE, POTASSIUM CHLORIDE, CALCIUM CHLORIDE 600; 310; 30; 20 MG/100ML; MG/100ML; MG/100ML; MG/100ML
INJECTION, SOLUTION INTRAVENOUS CONTINUOUS
Status: DISCONTINUED | OUTPATIENT
Start: 2021-03-13 | End: 2021-03-13 | Stop reason: HOSPADM

## 2021-03-13 RX ORDER — HYDROMORPHONE HYDROCHLORIDE 1 MG/ML
0.2 INJECTION, SOLUTION INTRAMUSCULAR; INTRAVENOUS; SUBCUTANEOUS
Status: DISCONTINUED | OUTPATIENT
Start: 2021-03-13 | End: 2021-03-13 | Stop reason: HOSPADM

## 2021-03-13 RX ORDER — HEPARIN SODIUM 5000 [USP'U]/100ML
INJECTION, SOLUTION INTRAVENOUS CONTINUOUS
Status: DISCONTINUED | OUTPATIENT
Start: 2021-03-13 | End: 2021-03-15

## 2021-03-13 RX ORDER — BUPIVACAINE HYDROCHLORIDE AND EPINEPHRINE 5; 5 MG/ML; UG/ML
INJECTION, SOLUTION EPIDURAL; INTRACAUDAL; PERINEURAL
Status: DISCONTINUED | OUTPATIENT
Start: 2021-03-13 | End: 2021-03-13 | Stop reason: HOSPADM

## 2021-03-13 RX ORDER — ONDANSETRON 2 MG/ML
INJECTION INTRAMUSCULAR; INTRAVENOUS PRN
Status: DISCONTINUED | OUTPATIENT
Start: 2021-03-13 | End: 2021-03-13 | Stop reason: SURG

## 2021-03-13 RX ORDER — OXYCODONE HYDROCHLORIDE 5 MG/1
5 TABLET ORAL
Status: DISCONTINUED | OUTPATIENT
Start: 2021-03-13 | End: 2021-03-20 | Stop reason: HOSPADM

## 2021-03-13 RX ORDER — MEPERIDINE HYDROCHLORIDE 25 MG/ML
12.5 INJECTION INTRAMUSCULAR; INTRAVENOUS; SUBCUTANEOUS
Status: DISCONTINUED | OUTPATIENT
Start: 2021-03-13 | End: 2021-03-13 | Stop reason: HOSPADM

## 2021-03-13 RX ORDER — SCOLOPAMINE TRANSDERMAL SYSTEM 1 MG/1
1 PATCH, EXTENDED RELEASE TRANSDERMAL
Status: DISCONTINUED | OUTPATIENT
Start: 2021-03-13 | End: 2021-03-20 | Stop reason: HOSPADM

## 2021-03-13 RX ORDER — DOCUSATE SODIUM 100 MG/1
100 CAPSULE, LIQUID FILLED ORAL 2 TIMES DAILY
Status: DISCONTINUED | OUTPATIENT
Start: 2021-03-13 | End: 2021-03-20 | Stop reason: HOSPADM

## 2021-03-13 RX ORDER — HYDROMORPHONE HYDROCHLORIDE 1 MG/ML
0.4 INJECTION, SOLUTION INTRAMUSCULAR; INTRAVENOUS; SUBCUTANEOUS
Status: DISCONTINUED | OUTPATIENT
Start: 2021-03-13 | End: 2021-03-13 | Stop reason: HOSPADM

## 2021-03-13 RX ORDER — KETOROLAC TROMETHAMINE 30 MG/ML
30 INJECTION, SOLUTION INTRAMUSCULAR; INTRAVENOUS EVERY 6 HOURS
Status: DISCONTINUED | OUTPATIENT
Start: 2021-03-14 | End: 2021-03-16

## 2021-03-13 RX ORDER — DEXTROSE MONOHYDRATE, SODIUM CHLORIDE, AND POTASSIUM CHLORIDE 50; 1.49; 4.5 G/1000ML; G/1000ML; G/1000ML
INJECTION, SOLUTION INTRAVENOUS EVERY 6 HOURS
Status: COMPLETED | OUTPATIENT
Start: 2021-03-13 | End: 2021-03-14

## 2021-03-13 RX ORDER — OXYCODONE HCL 5 MG/5 ML
10 SOLUTION, ORAL ORAL
Status: COMPLETED | OUTPATIENT
Start: 2021-03-13 | End: 2021-03-13

## 2021-03-13 RX ORDER — OXYCODONE HYDROCHLORIDE 10 MG/1
10 TABLET ORAL
Status: DISCONTINUED | OUTPATIENT
Start: 2021-03-13 | End: 2021-03-20 | Stop reason: HOSPADM

## 2021-03-13 RX ORDER — IODIXANOL 270 MG/ML
INJECTION, SOLUTION INTRAVASCULAR
Status: DISCONTINUED | OUTPATIENT
Start: 2021-03-13 | End: 2021-03-13 | Stop reason: HOSPADM

## 2021-03-13 RX ORDER — OXYCODONE HCL 5 MG/5 ML
5 SOLUTION, ORAL ORAL
Status: COMPLETED | OUTPATIENT
Start: 2021-03-13 | End: 2021-03-13

## 2021-03-13 RX ORDER — IPRATROPIUM BROMIDE AND ALBUTEROL SULFATE 2.5; .5 MG/3ML; MG/3ML
3 SOLUTION RESPIRATORY (INHALATION)
Status: DISCONTINUED | OUTPATIENT
Start: 2021-03-13 | End: 2021-03-20 | Stop reason: HOSPADM

## 2021-03-13 RX ORDER — DIPHENHYDRAMINE HYDROCHLORIDE 50 MG/ML
25 INJECTION INTRAMUSCULAR; INTRAVENOUS EVERY 6 HOURS PRN
Status: DISCONTINUED | OUTPATIENT
Start: 2021-03-13 | End: 2021-03-16

## 2021-03-13 RX ORDER — ACETAMINOPHEN 500 MG
1000 TABLET ORAL EVERY 6 HOURS
Status: DISPENSED | OUTPATIENT
Start: 2021-03-14 | End: 2021-03-18

## 2021-03-13 RX ORDER — HALOPERIDOL 5 MG/ML
1 INJECTION INTRAMUSCULAR EVERY 6 HOURS PRN
Status: DISCONTINUED | OUTPATIENT
Start: 2021-03-13 | End: 2021-03-20 | Stop reason: HOSPADM

## 2021-03-13 RX ORDER — ONDANSETRON 2 MG/ML
4 INJECTION INTRAMUSCULAR; INTRAVENOUS
Status: DISCONTINUED | OUTPATIENT
Start: 2021-03-13 | End: 2021-03-13 | Stop reason: HOSPADM

## 2021-03-13 RX ORDER — HYDROMORPHONE HYDROCHLORIDE 1 MG/ML
0.1 INJECTION, SOLUTION INTRAMUSCULAR; INTRAVENOUS; SUBCUTANEOUS
Status: DISCONTINUED | OUTPATIENT
Start: 2021-03-13 | End: 2021-03-13 | Stop reason: HOSPADM

## 2021-03-13 RX ORDER — HYDRALAZINE HYDROCHLORIDE 20 MG/ML
5 INJECTION INTRAMUSCULAR; INTRAVENOUS
Status: DISCONTINUED | OUTPATIENT
Start: 2021-03-13 | End: 2021-03-13 | Stop reason: HOSPADM

## 2021-03-13 RX ORDER — MIDAZOLAM HYDROCHLORIDE 1 MG/ML
1 INJECTION INTRAMUSCULAR; INTRAVENOUS
Status: DISCONTINUED | OUTPATIENT
Start: 2021-03-13 | End: 2021-03-13 | Stop reason: HOSPADM

## 2021-03-13 RX ORDER — HEPARIN SODIUM 1000 [USP'U]/ML
INJECTION, SOLUTION INTRAVENOUS; SUBCUTANEOUS PRN
Status: DISCONTINUED | OUTPATIENT
Start: 2021-03-13 | End: 2021-03-13 | Stop reason: SURG

## 2021-03-13 RX ORDER — ONDANSETRON 2 MG/ML
4 INJECTION INTRAMUSCULAR; INTRAVENOUS EVERY 4 HOURS PRN
Status: DISCONTINUED | OUTPATIENT
Start: 2021-03-13 | End: 2021-03-20 | Stop reason: HOSPADM

## 2021-03-13 RX ORDER — ACETAMINOPHEN 500 MG
1000 TABLET ORAL EVERY 6 HOURS PRN
Status: DISCONTINUED | OUTPATIENT
Start: 2021-03-19 | End: 2021-03-20 | Stop reason: HOSPADM

## 2021-03-13 RX ORDER — SODIUM CHLORIDE, SODIUM LACTATE, POTASSIUM CHLORIDE, CALCIUM CHLORIDE 600; 310; 30; 20 MG/100ML; MG/100ML; MG/100ML; MG/100ML
INJECTION, SOLUTION INTRAVENOUS
Status: DISCONTINUED | OUTPATIENT
Start: 2021-03-13 | End: 2021-03-13 | Stop reason: SURG

## 2021-03-13 RX ORDER — HALOPERIDOL 5 MG/ML
1 INJECTION INTRAMUSCULAR
Status: DISCONTINUED | OUTPATIENT
Start: 2021-03-13 | End: 2021-03-13 | Stop reason: HOSPADM

## 2021-03-13 RX ADMIN — FENTANYL CITRATE 50 MCG: 50 INJECTION, SOLUTION INTRAMUSCULAR; INTRAVENOUS at 19:09

## 2021-03-13 RX ADMIN — EPHEDRINE SULFATE 20 MG: 50 INJECTION, SOLUTION INTRAVENOUS at 15:18

## 2021-03-13 RX ADMIN — SODIUM CHLORIDE, POTASSIUM CHLORIDE, SODIUM LACTATE AND CALCIUM CHLORIDE: 600; 310; 30; 20 INJECTION, SOLUTION INTRAVENOUS at 14:34

## 2021-03-13 RX ADMIN — PROPOFOL 200 MG: 10 INJECTION, EMULSION INTRAVENOUS at 14:40

## 2021-03-13 RX ADMIN — GABAPENTIN 300 MG: 300 CAPSULE ORAL at 23:07

## 2021-03-13 RX ADMIN — ONDANSETRON 4 MG: 2 INJECTION INTRAMUSCULAR; INTRAVENOUS at 18:06

## 2021-03-13 RX ADMIN — Medication 100 MCG: at 17:44

## 2021-03-13 RX ADMIN — HEPARIN SODIUM 9000 UNITS: 1000 INJECTION, SOLUTION INTRAVENOUS; SUBCUTANEOUS at 16:06

## 2021-03-13 RX ADMIN — OXYCODONE HYDROCHLORIDE 10 MG: 10 TABLET ORAL at 11:45

## 2021-03-13 RX ADMIN — FENTANYL CITRATE 75 MCG: 50 INJECTION, SOLUTION INTRAMUSCULAR; INTRAVENOUS at 15:16

## 2021-03-13 RX ADMIN — EPHEDRINE SULFATE 10 MG: 50 INJECTION, SOLUTION INTRAVENOUS at 15:54

## 2021-03-13 RX ADMIN — KETOROLAC TROMETHAMINE 30 MG: 30 INJECTION, SOLUTION INTRAMUSCULAR at 23:08

## 2021-03-13 RX ADMIN — HEPARIN SODIUM 3900 UNITS: 1000 INJECTION, SOLUTION INTRAVENOUS; SUBCUTANEOUS at 11:40

## 2021-03-13 RX ADMIN — CEFAZOLIN 2 G: 330 INJECTION, POWDER, FOR SOLUTION INTRAMUSCULAR; INTRAVENOUS at 14:40

## 2021-03-13 RX ADMIN — Medication 100 MCG: at 16:11

## 2021-03-13 RX ADMIN — HEPARIN SODIUM 4000 UNITS: 1000 INJECTION, SOLUTION INTRAVENOUS; SUBCUTANEOUS at 17:05

## 2021-03-13 RX ADMIN — ACETAMINOPHEN 1000 MG: 500 TABLET, FILM COATED ORAL at 23:07

## 2021-03-13 RX ADMIN — CEFAZOLIN 1 G: 330 INJECTION, POWDER, FOR SOLUTION INTRAMUSCULAR; INTRAVENOUS at 18:38

## 2021-03-13 RX ADMIN — OXYCODONE HYDROCHLORIDE 10 MG: 5 SOLUTION ORAL at 19:37

## 2021-03-13 RX ADMIN — HYDROMORPHONE HYDROCHLORIDE 0.4 MG: 1 INJECTION, SOLUTION INTRAMUSCULAR; INTRAVENOUS; SUBCUTANEOUS at 19:59

## 2021-03-13 RX ADMIN — FENTANYL CITRATE 50 MCG: 50 INJECTION, SOLUTION INTRAMUSCULAR; INTRAVENOUS at 22:28

## 2021-03-13 RX ADMIN — FENTANYL CITRATE 50 MCG: 50 INJECTION, SOLUTION INTRAMUSCULAR; INTRAVENOUS at 19:00

## 2021-03-13 RX ADMIN — EPHEDRINE SULFATE 10 MG: 50 INJECTION, SOLUTION INTRAVENOUS at 15:02

## 2021-03-13 RX ADMIN — FENTANYL CITRATE 75 MCG: 50 INJECTION, SOLUTION INTRAMUSCULAR; INTRAVENOUS at 15:11

## 2021-03-13 RX ADMIN — ACETAMINOPHEN 650 MG: 325 TABLET, FILM COATED ORAL at 04:55

## 2021-03-13 RX ADMIN — EPHEDRINE SULFATE 10 MG: 50 INJECTION, SOLUTION INTRAVENOUS at 16:57

## 2021-03-13 RX ADMIN — POTASSIUM CHLORIDE, DEXTROSE MONOHYDRATE AND SODIUM CHLORIDE: 150; 5; 450 INJECTION, SOLUTION INTRAVENOUS at 23:23

## 2021-03-13 RX ADMIN — LIDOCAINE HYDROCHLORIDE 100 MG: 20 INJECTION, SOLUTION EPIDURAL; INFILTRATION; INTRACAUDAL at 14:40

## 2021-03-13 RX ADMIN — ROCURONIUM BROMIDE 50 MG: 10 INJECTION, SOLUTION INTRAVENOUS at 15:16

## 2021-03-13 RX ADMIN — Medication 100 MCG: at 17:10

## 2021-03-13 RX ADMIN — Medication 100 MCG: at 14:44

## 2021-03-13 RX ADMIN — HEPARIN SODIUM 20 UNITS/KG/HR: 5000 INJECTION, SOLUTION INTRAVENOUS at 05:03

## 2021-03-13 RX ADMIN — Medication 100 MCG: at 18:26

## 2021-03-13 RX ADMIN — Medication 100 MCG: at 14:54

## 2021-03-13 RX ADMIN — ALBUTEROL SULFATE 2.5 MG: 2.5 SOLUTION RESPIRATORY (INHALATION) at 19:10

## 2021-03-13 RX ADMIN — Medication 200 MG: at 14:40

## 2021-03-13 RX ADMIN — OXYCODONE HYDROCHLORIDE 10 MG: 10 TABLET ORAL at 23:07

## 2021-03-13 RX ADMIN — Medication 2.5 MG: at 19:10

## 2021-03-13 RX ADMIN — OXYCODONE HYDROCHLORIDE 10 MG: 10 TABLET ORAL at 04:55

## 2021-03-13 RX ADMIN — FENTANYL CITRATE 100 MCG: 50 INJECTION, SOLUTION INTRAMUSCULAR; INTRAVENOUS at 14:40

## 2021-03-13 RX ADMIN — OXYCODONE HYDROCHLORIDE 10 MG: 10 TABLET ORAL at 07:54

## 2021-03-13 RX ADMIN — SUGAMMADEX 200 MG: 100 INJECTION, SOLUTION INTRAVENOUS at 18:38

## 2021-03-13 RX ADMIN — HYDROMORPHONE HYDROCHLORIDE 0.4 MG: 1 INJECTION, SOLUTION INTRAMUSCULAR; INTRAVENOUS; SUBCUTANEOUS at 20:19

## 2021-03-13 RX ADMIN — Medication 100 MCG: at 18:16

## 2021-03-13 RX ADMIN — EPHEDRINE SULFATE 10 MG: 50 INJECTION, SOLUTION INTRAVENOUS at 15:00

## 2021-03-13 RX ADMIN — FENTANYL CITRATE 50 MCG: 50 INJECTION, SOLUTION INTRAMUSCULAR; INTRAVENOUS at 18:37

## 2021-03-13 RX ADMIN — HYDROMORPHONE HYDROCHLORIDE 0.2 MG: 1 INJECTION, SOLUTION INTRAMUSCULAR; INTRAVENOUS; SUBCUTANEOUS at 20:02

## 2021-03-13 RX ADMIN — ROCURONIUM BROMIDE 50 MG: 10 INJECTION, SOLUTION INTRAVENOUS at 14:40

## 2021-03-13 ASSESSMENT — ENCOUNTER SYMPTOMS
SHORTNESS OF BREATH: 0
CHILLS: 0
SENSORY CHANGE: 1
EYE DISCHARGE: 0
VOMITING: 0
BLURRED VISION: 0
FEVER: 0
EYE REDNESS: 0

## 2021-03-13 ASSESSMENT — PAIN DESCRIPTION - PAIN TYPE
TYPE: SURGICAL PAIN
TYPE: ACUTE PAIN
TYPE: SURGICAL PAIN
TYPE: ACUTE PAIN
TYPE: SURGICAL PAIN
TYPE: ACUTE PAIN
TYPE: SURGICAL PAIN
TYPE: ACUTE PAIN
TYPE: SURGICAL PAIN
TYPE: SURGICAL PAIN

## 2021-03-13 ASSESSMENT — PAIN SCALES - GENERAL: PAIN_LEVEL: 5

## 2021-03-13 ASSESSMENT — FIBROSIS 4 INDEX: FIB4 SCORE: 1.04

## 2021-03-13 NOTE — PROGRESS NOTES
"2 RN skin check done with SHELLEY Chen.    Wound noted to the plantar surface of the L foot.  Photos taken and uploaded to epic.  Wound consult placed.  Redness and swelling noted to the LLE.    Dry \"scabbed\" appearing area to the left buttocks.    Skin otherwise intact.  "

## 2021-03-13 NOTE — PROGRESS NOTES
Vascular    No acute changes  OR today for LLE bypass    Brian Cameron MD  [facility] (General and Vascular Surgery)  Cell: [phone] (text/call)  Office: [phone]  __________________________________________________________________  Patient:Antonio Cantu   MRN:[id number]   CSN:[id number]    3/13/2021    11:57 AM

## 2021-03-13 NOTE — ANESTHESIA PROCEDURE NOTES
Arterial Line  Performed by: Kj Stanford D.O.  Authorized by: Kj Stanford D.O.     Start Time:  3/13/2021 2:42 PM  End Time:  3/13/2021 2:44 PM  Localization: ultrasound guidance and surface landmarks    Patient Location:  OR  Indication: continuous blood pressure monitoring        Catheter Size:  20 G  Seldinger Technique?: Yes    Laterality:  Left  Site:  Radial artery  Line Secured:  Antimicrobial disc, tape and transparent dressing  Events: patient tolerated procedure well with no complications

## 2021-03-13 NOTE — ANESTHESIA PREPROCEDURE EVALUATION
Relevant Problems   PULMONARY   (+) Chronic bronchitis (HCC)      CARDIAC   (+) Essential hypertension   (+) Femoral artery occlusion (HCC)      Other   (+) Alcohol use disorder, moderate, in controlled environment (HCC)   (+) Current every day smoker       Physical Exam    Airway   Mallampati: II  TM distance: >3 FB  Neck ROM: full       Cardiovascular - normal exam  Rhythm: regular  Rate: normal  (-) murmur     Dental - normal exam           Pulmonary - normal exam  Breath sounds clear to auscultation     Abdominal    Neurological - normal exam                 Anesthesia Plan    ASA 3   ASA physical status 3 criteria: COPD    Plan - general       Airway plan will be ETT          Induction: intravenous    Postoperative Plan: Postoperative administration of opioids is intended.    Pertinent diagnostic labs and testing reviewed    Informed Consent:    Anesthetic plan and risks discussed with patient.    Use of blood products discussed with: patient whom consented to blood products.

## 2021-03-13 NOTE — ED NOTES
Med rec completed per Pt at bedside and phone call to Pt's pharmacy [facility] in [place] ([phone]) to verify Pt's antibiotic and strength of Pt's Tylenol-Codeine.  Allergies reviewed with Pt. No known drug allergies.  Pt is currently on a 10 day course of cephalexin 500 mg 1 capsule four times per day started 3/10/2021. Last dose today at 0800.

## 2021-03-13 NOTE — CARE PLAN
Problem: Communication  Goal: The ability to communicate needs accurately and effectively will improve  Outcome: PROGRESSING AS EXPECTED    Pt calls appropriately for needs.     Problem: Pain Management  Goal: Pain level will decrease to patient's comfort goal  Outcome: PROGRESSING AS EXPECTED    Pt's pain being well managed at this time.

## 2021-03-13 NOTE — ANESTHESIA PROCEDURE NOTES
Airway    Date/Time: 3/13/2021 2:40 PM  Performed by: Kj Stanford D.O.  Authorized by: Kj Stanford D.O.     Location:  OR  Urgency:  Elective  Indications for Airway Management:  Anesthesia      Spontaneous Ventilation: absent    Sedation Level:  Deep  Preoxygenated: Yes    Patient Position:  Sniffing  Mask Difficulty Assessment:  0 - not attempted  Final Airway Type:  Endotracheal airway  Final Endotracheal Airway:  ETT  Cuffed: Yes    Technique Used for Successful ETT Placement:  Direct laryngoscopy    Insertion Site:  Oral  Blade Type:  Demetri  Laryngoscope Blade/Videolaryngoscope Blade Size:  4  ETT Size (mm):  8.0  Measured from:  Teeth  ETT to Teeth (cm):  26  Placement Verified by: auscultation and capnometry    Cormack-Lehane Classification:  Grade IIa - partial view of glottis  Number of Attempts at Approach:  1

## 2021-03-13 NOTE — CARE PLAN
Problem: Safety  Goal: Will remain free from injury  3/13/2021 0740 by Jaimie Suarez R.N.  Outcome: PROGRESSING AS EXPECTED  Note: Educated to dangle at bedside prior to standing  3/12/2021 1753 by Jaimie Suarez R.N.  Outcome: PROGRESSING AS EXPECTED     Problem: Pain Management  Goal: Pain level will decrease to patient's comfort goal  Outcome: PROGRESSING AS EXPECTED  Note: Medicated per MAR prn

## 2021-03-13 NOTE — RESPIRATORY CARE
"   COPD EDUCATION by COPD CLINICAL EDUCATOR  3/13/2021 at 11:11 AM by Zahida Vides, RRT     Smoking Cessation Intervention and education completed, 5 minutes spent on smoking cessation education with patient.    Provided smoking cessation packet with \"Tips to Quit\" and brochure for \"Free Smoking Cessation Classes\".       COPD Screen       COPD Assessment  COPD Clinical Specialists ONLY  COPD Education Initiated: Yes--Short Intervention:  No hx or dx of COPD, no home pulm meds, smoking cessation education done.    Smoking Cessation: Yes    $ Smoking Cessation 3-10 Minutes: Asymptomatic  Is this a COPD exacerbation patient?: No      "

## 2021-03-13 NOTE — ASSESSMENT & PLAN NOTE
Patient has a history of polycythemia, likely related to heavy smoking   however he has never been phlebotomized.  Hemoglobin here is 17 on admission  Now anemic after surgeries

## 2021-03-13 NOTE — CONSULTS
Vascular Surgery Consult Note  -------------------------------------------------------------------------------------------------  Date: 3/12/2021    Consulting Physician: Brian Cameron M.D. [facility]    Referring Provider:  Elio Mitchell MD  -------------------------------------------------------------------------------------------------    Reason for consultation:  PAOD    HPI:  This is a 60 y.o. male who is presenting with left lower extremity pain and evidence of a left foot infection. He says his podiatrist recently removed something from his foot and when I showed him the location of the wound on the sole of the foot he said that is where the podiatrist operated. He has PAOD and CTA confirmed bilateral SFA and POP occlusions. Eventhough the severity and distribution of PAOD appears the same bilaterally the right leg does not appear to be bothering him at all      Past Medical History:   Diagnosis Date   • Adenomatous colon polyp 11/2018    multiple, recall colonoscopy in 3 years   • BPH with obstruction/lower urinary tract symptoms    • Cervical spine fracture (HCC)    • Chronic bronchitis (HCC)    • Current every day smoker 6/19/2018   • Erectile dysfunction 6/19/2018   • Hammer toe    • Herpes    • Hypertension    • Insomnia    • Mixed hyperlipidemia 9/6/2018   • Polycythemia 9/6/2018   • Prehypertension 6/19/2018   • Seasonal allergic rhinitis    • Sleep apnea        Past Surgical History:   Procedure Laterality Date   • HAMMERTOE CORRECTION         Current Facility-Administered Medications   Medication Dose Route Frequency Provider Last Rate Last Admin   • heparin infusion 25,000 units in 500 mL 0.45% NACL  0-30 Units/kg/hr Intravenous Continuous Elio Mitchell M.D. 35.1 mL/hr at 03/12/21 1818 18 Units/kg/hr at 03/12/21 1818   • heparin injection 3,900 Units  40 Units/kg Intravenous PRN Elio Mitchell M.D.       • gabapentin (NEURONTIN) capsule 300 mg  300 mg Oral QHS Rob  EFRAÍN Pulido.O.   300 mg at 03/12/21 2004   • [START ON 3/13/2021] senna-docusate (PERICOLACE or SENOKOT S) 8.6-50 MG per tablet 2 tablet  2 tablet Oral BID EFRAÍN Cifuentes.CLIFFORD        And   • polyethylene glycol/lytes (MIRALAX) PACKET 1 Packet  1 Packet Oral QDAY PRN Rob Pulido D.O.        And   • magnesium hydroxide (MILK OF MAGNESIA) suspension 30 mL  30 mL Oral QDAY PRN NINOSKA CifuentesOShari        And   • bisacodyl (DULCOLAX) suppository 10 mg  10 mg Rectal QDAY PRN EFRAÍN Cifuentes.O.       • Respiratory Therapy Consult   Nebulization Continuous RT EFRAÍN Cifuentes.SAMARA.       • lactated ringers infusion   Intravenous Continuous EFRAÍN Cifuentes.OShari 83 mL/hr at 03/12/21 1758 New Bag at 03/12/21 1758   • acetaminophen (Tylenol) tablet 650 mg  650 mg Oral Q6HRS PRN EFARÍN Cifuentes.O.   650 mg at 03/12/21 2004   • Pharmacy Consult Request ...Pain Management Review 1 Each  1 Each Other PHARMACY TO DOSE EFRAÍN Cifuentes.SAMARA.       • oxyCODONE immediate-release (ROXICODONE) tablet 5 mg  5 mg Oral Q3HRS PRN EFRAÍN Cifuentes.O.   5 mg at 03/12/21 1706    Or   • oxyCODONE immediate release (ROXICODONE) tablet 10 mg  10 mg Oral Q3HRS PRN EFRAÍN Cifuentes.O.   10 mg at 03/12/21 2004    Or   • morphine (pf) 4 mg/mL injection 4 mg  4 mg Intravenous Q3HRS PRN EFRAÍN Cifuentes.O.       • ondansetron (ZOFRAN) syringe/vial injection 4 mg  4 mg Intravenous Q4HRS PRN EFRAÍN Cifuentes.O.       • ondansetron (ZOFRAN ODT) dispertab 4 mg  4 mg Oral Q4HRS PRN EFRAÍN Cifuentes.O.       • promethazine (PHENERGAN) tablet 12.5-25 mg  12.5-25 mg Oral Q4HRS PRN Rob Pulido D.O.       • promethazine (PHENERGAN) suppository 12.5-25 mg  12.5-25 mg Rectal Q4HRS PRN Rob Pulido D.O.       • prochlorperazine (COMPAZINE) injection 5-10 mg  5-10 mg Intravenous Q4HRS PRN Rob Pulido D.O.       •  labetalol (NORMODYNE/TRANDATE) injection 10 mg  10 mg Intravenous Q4HRS PRN Rob Pulido D.O.       • nicotine (NICODERM) 21 MG/24HR 21 mg  21 mg Transdermal Daily-0600 Rob Pulido D.O.        And   • nicotine polacrilex (NICORETTE) 2 MG piece 2 mg  2 mg Oral Q HOUR PRN Rob Pulido D.O.           Social History     Socioeconomic History   • Marital status:      Spouse name: Not on file   • Number of children: Not on file   • Years of education: Not on file   • Highest education level: Not on file   Occupational History   • Not on file   Tobacco Use   • Smoking status: Current Every Day Smoker     Packs/day: 1.00     Years: 45.00     Pack years: 45.00     Types: Cigarettes   • Smokeless tobacco: Never Used   • Tobacco comment: 20 cig/day   Substance and Sexual Activity   • Alcohol use: Yes     Alcohol/week: 3.0 - 3.6 oz     Types: 5 - 6 Cans of beer per week     Comment: 1-6 beers/day    • Drug use: Yes     Types: Marijuana, Inhaled     Comment: Occ   • Sexual activity: Yes     Partners: Female   Other Topics Concern   • Not on file   Social History Narrative    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     Diet: typically eats one meal per day, just dinner. Does drink 1-6 beers per night, sometimes up to 10 beers a night.    Not . 2 daughters, 2 grandchildren.     Hobbies: enjoys camping, golfing, bicycle.      Social Determinants of Health     Financial Resource Strain:    • Difficulty of Paying Living Expenses:    Food Insecurity:    • Worried About Running Out of Food in the Last Year:    • Ran Out of Food in the Last Year:    Transportation Needs:    • Lack of Transportation (Medical):    • Lack of Transportation (Non-Medical):    Physical Activity:    • Days of Exercise per Week:    • Minutes of Exercise per Session:    Stress:    • Feeling of Stress :    Social Connections:    • Frequency of Communication with Friends and  "Family:    • Frequency of Social Gatherings with Friends and Family:    • Attends [employer] Services:    • Active Member of Clubs or Organizations:    • Attends Club or Organization Meetings:    • Marital Status:    Intimate Partner Violence:    • Fear of Current or Ex-Partner:    • Emotionally Abused:    • Physically Abused:    • Sexually Abused:        Family History   Problem Relation Age of Onset   • Arthritis Other    • Diabetes Mother    • Cancer Father         colon or prostate    • Lung Disease Father         emphysema    • Diabetes Sister    • Diabetes Brother    • Diabetes Maternal Grandmother    • Diabetes Paternal Grandfather    • No Known Problems Daughter    • No Known Problems Daughter    • No Known Problems Grandchild    • No Known Problems Grandchild        Allergies:  Patient has no known allergies.    Review of Systems:  Noncontributory except as per HPI    Physical Exam:  /91   Pulse 61   Temp 36.8 °C (98.3 °F) (Temporal)   Resp 18   Ht 1.956 m (6' 5\")   Wt 97.5 kg (215 lb)   SpO2 96%     Constitutional: Alert, oriented, no acute distress  HEENT:  Normocephalic and atraumatic, EOMI  Neck:   Supple, no JVD,   Cardiovascular: Regular rate and rhythm,   Pulmonary:  Good air entry bilaterally,    Abdominal:  Soft, non-tender, non-distended     Aortic impulse not widened  Musculoskeletal: No edema, no tenderness except the LLE which has edema and tenderness of the foot  Neurological:  CN II-XII grossly intact, no focal deficits  Skin:   Skin is warm and dry. No rash noted.  Psychiatric:  Normal mood and affect.    Labs:  Recent Labs     03/12/21  1535   WBC 10.6   RBC 5.23   HEMOGLOBIN 16.3   HEMATOCRIT 49.7   MCV 95.0   MCH 31.2   MCHC 32.8*   RDW 45.9   PLATELETCT 297   MPV 9.4         Recent Labs     03/12/21  1535   APTT 96.5*   INR 0.98     Recent Labs     03/12/21  1535   INR 0.98       Radiology:  CTA shows bilateral long segment SFA/POP occlusions that appear " carmen    Assessment/Plan:  -  PAOD  -  Left foot infection    Left foot infection will need revascularization for limb salvage.  Will schedule surgery Saturday or Sunday depending on OR availability      Brian Cameron MD  [facility] (General and Vascular Surgery)  Cell: [phone] (text or call is fine, if you don't reach me please try my office)  Office: [phone]    3/12/2021    10:10 PM  ___________________________________________________________________  Patient:Antonio Cantu   MRN:[id number]   CSN:[id number]    Addendum 6/8/2021 9:02 AM  Referring provider for this consultation was Elio Cameron MD

## 2021-03-13 NOTE — ASSESSMENT & PLAN NOTE
Came with left leg pain and discoloration  CTA showed occlusion on the artery  Bypass was done by vascular surgeon on 3/13  Thrombectomy and revision on 3/14  Continue atorvastatin  S/p conversation about smoking cessation he does appear motivated.  Unasyn for his wound started on 3/16  Artery Doppler on 3/17 did not show aneurysm however showed hematoma.   Pain control with oxycodone and morphine  Increase gabapentin to 300 p.o. 3 times daily for pain management

## 2021-03-13 NOTE — HOSPITAL COURSE
60-year-old male with a history of BPH, tobacco use, erectile dysfunction, hypertension, dyslipidemia, polycythemia who presented to [facility] on 3/12 with left leg pain and wound where a CTA was performed which showed occlusion of the femoral artery running distally.  He was transferred to [facility].  He was started on heparin drip.  Vascular surgery was consulted.  Dr. Cameron performed left lower extremity bypass on 3/13/2021, however after the procedure at night noted to have bleeding at left groin site, sand bag applied, vitals stable and H/H stable.  During surgery and postop unable to find pulses on doppler, concern for thrombosis. He was taken back to OR on 3/14/21 for thrombectomy and revision.  He then had prevena placed.  Pulses now able to doppler, wound care was consulted and wound VAC was placed, his swelling was not improved well, artery Doppler on 3/17 did not show aneurysm however showed hematoma.         Patient has a deep wound on the left foot, x-ray showed possible osteomyelitis, MRI did not show any signs of osteomyelitis, continue Unasyn for wound infection.    Patient was evaluated by PT and OT and no need for therapy, home health was ordered for wound care, however due to shortness of breath, patient preferred to follow-up with wound clinic, next appointment will be 3/22 at 0745.    Patient has history of heavy smoking for years, I had a long discussion with the patient about importance of quitting smoking, and discussed the risk including not limited to worsening peripheral vascular disease, COPD and lung cancer, he understood however he is not willing to quit.    Labs showed A1c 6, encouraged the patient for healthy diet and exercise and no need for medication at this time.

## 2021-03-13 NOTE — ED NOTES
Report to Jaimie ROAS,  Pt medicated prior to transport.  Remains on heparing gtt with no complications noted.    All belongings sent with pt.

## 2021-03-13 NOTE — H&P
Hospital Medicine History & Physical Note    Date of Service  3/12/2021    Primary Care Physician  Joann Carmona P.A.-C.    Consultants  Vascular surgery    Code Status  Full Code    Chief Complaint  Chief Complaint   Patient presents with   • Leg Swelling     Transfer from [facility] for L LE clot.  Leg is discolored and swollen x 1 week, pain with palpation.  U/S showed L arterial occlusion Arrived on heparin gtt.   • Open Wound     L lateral foot 1x1 wound, macerated edges.       History of Presenting Illness  60 y.o. male who presented 3/12/2021 with a previous medical history that includes BPH, current tobacco use, erectile dysfunction, hypertension, dyslipidemia, polycythemia.    Patient reports that about 5 to 6 days ago he started to notice increasing pain on his left leg.  At first it was primarily around his lower leg and ankle, but is since gotten up to around the knee.  Symptoms of gotten worse in terms of pain, and redness as well as swelling.  Symptoms are worse when he gets up and walks around and if he has his leg down, alleviated somewhat if he keeps it up.  He went to get it evaluated to [facility] earlier today.  There he had a CTA done, this is demonstrated occlusion of the femoral artery running distally.  Patient was then started on a heparin drip.  He is sent to our facility as they do not have vascular surgery on call for them today.    Review of Systems  Review of Systems   Constitutional: Negative for chills and fever.   HENT: Negative for nosebleeds and sore throat.    Eyes: Negative for blurred vision and double vision.   Respiratory: Negative for cough and shortness of breath.    Cardiovascular: Positive for leg swelling. Negative for chest pain and palpitations.   Gastrointestinal: Negative for abdominal pain, diarrhea, nausea and vomiting.   Genitourinary: Negative for dysuria and urgency.   Musculoskeletal: Negative for back pain.   Skin: Negative for rash.   Neurological: Negative  for dizziness, loss of consciousness and headaches.       Past Medical History   has a past medical history of Adenomatous colon polyp (11/2018), BPH with obstruction/lower urinary tract symptoms, Cervical spine fracture (HCC), Chronic bronchitis (HCC), Current every day smoker (6/19/2018), Erectile dysfunction (6/19/2018), Hammer toe, Herpes, Hypertension, Insomnia, Mixed hyperlipidemia (9/6/2018), Polycythemia (9/6/2018), Prehypertension (6/19/2018), Seasonal allergic rhinitis, and Sleep apnea.    Surgical History   has a past surgical history that includes hammertoe correction.     Family History  family history includes Arthritis in an other family member; Cancer in his father; Diabetes in his brother, maternal grandmother, mother, paternal grandfather, and sister; Lung Disease in his father; No Known Problems in his daughter, daughter, grandchild, and grandchild.     Social History   reports that he has been smoking cigarettes. He has a 45.00 pack-year smoking history. He has never used smokeless tobacco. He reports current alcohol use of about 3.0 - 3.6 oz of alcohol per week. He reports current drug use. Drugs: Marijuana and Inhaled.    Allergies  No Known Allergies    Medications  Prior to Admission Medications   Prescriptions Last Dose Informant Patient Reported? Taking?   acetaminophen-codeine #3 (TYLENOL #3) 300-30 MG Tab 3/12/2021 at 0800 Patient's Home Pharmacy Yes Yes   Sig: Take 1 tablet by mouth every four hours as needed for Moderate Pain.   cephALEXin (KEFLEX) 500 MG Cap 3/12/2021 at 0800 Patient's Home Pharmacy Yes Yes   Sig: Take 500 mg by mouth 4 times a day. 10 day course started 3/10/2021.   gabapentin (NEURONTIN) 300 MG Cap 3/12/2021 at 0800 Patient No No   Sig: Take 1 capsule by mouth every bedtime.   Patient taking differently: Take 300 mg by mouth 2 Times a Day.   ibuprofen (MOTRIN) 200 MG Tab 3/11/2021 at AM Patient Yes Yes   Sig: Take 600-800 mg by mouth every 6 hours as needed (Moderate  Pain). 3 to 4 tablets = 600 to 800 mg.   naproxen (ALEVE) 220 MG tablet 2~3 days ago at PRN Patient Yes Yes   Sig: Take 440 mg by mouth 2 times a day as needed (Pain). 2 tablets = 440 mg.   tadalafil (CIALIS) 20 MG tablet 3/7/2021 at PRN Patient No No   Sig: Take 1 tablet by mouth as needed for Erectile Dysfunction.      Facility-Administered Medications: None       Physical Exam  Temp:  [37 °C (98.6 °F)] 37 °C (98.6 °F)  Pulse:  [67] 67  Resp:  [16] 16  BP: (127)/(76) 127/76  SpO2:  [94 %] 94 %    Physical Exam  Vitals reviewed.   Constitutional:       General: He is not in acute distress.     Appearance: He is well-developed and normal weight. He is not diaphoretic.   HENT:      Head: Normocephalic and atraumatic.   Eyes:      Conjunctiva/sclera: Conjunctivae normal.   Neck:      Vascular: No JVD.   Cardiovascular:      Rate and Rhythm: Normal rate.      Heart sounds: No murmur. No gallop.    Pulmonary:      Effort: Pulmonary effort is normal. No respiratory distress.      Breath sounds: No stridor. No wheezing or rales.   Abdominal:      Palpations: Abdomen is soft.      Tenderness: There is no abdominal tenderness. There is no guarding or rebound.   Musculoskeletal:         General: Tenderness present.      Right lower leg: No edema.      Left lower leg: Edema present.      Comments: There is significant erythema which involves the whole of the left leg beginning around the proximal portion of the lower leg.  Distal pulses are not palpable.  Capillary refill is delayed at around 3 to 4 seconds relative to the uninvolved side.  There is a good pulse at the left femoral artery, I am unable to palpate the popliteal DP or TP arteries.   Skin:     General: Skin is warm and dry.      Findings: No rash.   Neurological:      Mental Status: He is alert and oriented to person, place, and time. Mental status is at baseline.   Psychiatric:         Mood and Affect: Mood normal.         Thought Content: Thought content  normal.         Laboratory:          No results for input(s): ALTSGPT, ASTSGOT, ALKPHOSPHAT, TBILIRUBIN, DBILIRUBIN, GAMMAGT, AMYLASE, LIPASE, ALB, PREALBUMIN, GLUCOSE in the last 72 hours.  Recent Labs     03/12/21  1535   APTT 96.5*   INR 0.98     No results for input(s): NTPROBNP in the last 72 hours.      No results for input(s): TROPONINT in the last 72 hours.    Imaging:  CT-FOREIGN FILM CAT SCAN   Final Result            Assessment/Plan:  I anticipate this patient will require at least two midnights for appropriate medical management, necessitating inpatient admission.    Femoral artery occlusion (HCC)  Assessment & Plan  Patient is currently on a heparin drip which we will continue.  Vascular surgery has been consulted and we look forward to their evaluation.  Risk factors include tobacco use, hypertension, and dyslipidemia as well as age and male sex.  I had a conversation with him tonight about smoking cessation he does appear motivated.    Mixed hyperlipidemia- (present on admission)  Assessment & Plan  Check fasting lipid panel  Is not currently on a statin    Polycythemia- (present on admission)  Assessment & Plan  Patient has a history of polycythemia, however he has never been phlebotomized.  Hemoglobin here is 17    Current every day smoker- (present on admission)  Assessment & Plan  I counseled the patient x10 minutes.  He has been smoking for many years but has been trying to slowly quit.  He is currently down to about 10 cigarettes daily.  He has not had any luck with Chantix, he has tried patches but has not tried gum.  As needed replacement  Continue to support and encourage him.    Essential hypertension- (present on admission)  Assessment & Plan  Continue lisinopril  Monitor and titrate

## 2021-03-13 NOTE — PROGRESS NOTES
Assumed care of patient. Report received. Assessment complete.  All questions and concerns addressed.    Pt aox4. No visible signs of distress. VSS. IV heparin being administered without any signs or symptoms of bleeding. Pt has had one therapeutic heparin xa draw, next draw scheduled for 1019 this AM. Bed locked and in low position.

## 2021-03-13 NOTE — PROGRESS NOTES
"Assumed care of patient from ER RN.  Patient is alert and oriented times 4, states pain of 3/10, declines intervention at this time.  VSS /84   Pulse 66   Temp 37.2 °C (98.9 °F) (Temporal)   Resp 18   Ht 1.956 m (6' 5\")   Wt 97.5 kg (215 lb)   SpO2 96%   BMI 25.50 kg/m²   PIV in the RAC, patent and running heparin gtt at 18u/kg/hr.  Rate verified with another RN.  Next Heparin XA scheduled for 2134.  PIV in the LAC, patent and running LR at 83mL/hr.  On RA with saturations in the mid 90s.  Last BM today, urinating without difficulty.  NPO with ice chips, tolerating well.  LLE red and painful, swelling noted.    Wound to the L plantar region of the foot.  Wound photos taken and uploaded to epic.  Wound consult placed.  Patient is a SBA, demonstrates steady gait, minimal assistance needed.  Patient oriented to the unit, POC discussed for the day.  Bed is locked and in the lowest position, call light is within reach.  All needs are met at this time, hourly rounding is in place.  "

## 2021-03-13 NOTE — PROGRESS NOTES
"Hospital Medicine Daily Progress Note    Date of Service  3/13/2021    Chief Complaint  60 y.o. male admitted 3/12/2021 with leg pain    Hospital Course  60-year-old male with a history of BPH, tobacco use, erectile dysfunction, hypertension, dyslipidemia, polycythemia who presented to [facility] with left leg pain and wound where a CTA was performed which showed occlusion of the femoral artery running distally.  He was transferred to [facility].  He was started on heparin drip.  Vascular surgery was consulted.  Dr. Cameron performed left lower extremity bypass on 3/13/2021.      Interval Problem Update  Dated yesterday  Having severe left lower extremity pain, hesitant to take pain medication due to \"cost\", worried about insurance  Afebrile  On heparin drip, on IV fluids  Going for BiPAP surgery today with vascular surgery    Consultants/Specialty  Vascular surgery    Code Status  Full Code    Disposition  TBD    Review of Systems  Review of Systems   Constitutional: Negative for chills and fever.   HENT: Negative for nosebleeds.    Eyes: Negative for blurred vision, discharge and redness.   Respiratory: Negative for shortness of breath.    Cardiovascular: Positive for leg swelling. Negative for chest pain.   Gastrointestinal: Negative for vomiting.   Genitourinary: Negative for hematuria.   Musculoskeletal:        Left leg and foot pain   Skin: Positive for rash (back).        Wound on left foot, plantar surface   Neurological: Positive for sensory change.        Physical Exam  Temp:  [36.2 °C (97.2 °F)-37.2 °C (98.9 °F)] 36.4 °C (97.5 °F)  Pulse:  [60-69] 60  Resp:  [17-21] 17  BP: (112-154)/(71-98) 154/94  SpO2:  [90 %-96 %] 96 %    Physical Exam  Vitals and nursing note reviewed.   Constitutional:       General: He is not in acute distress.     Appearance: He is not toxic-appearing or diaphoretic.   HENT:      Head: Normocephalic and atraumatic.      Nose: Nose normal.      " Mouth/Throat:      Mouth: Mucous membranes are moist.   Eyes:      General: No scleral icterus.     Conjunctiva/sclera: Conjunctivae normal.   Cardiovascular:      Rate and Rhythm: Normal rate and regular rhythm.      Heart sounds: No murmur. No friction rub. No gallop.    Pulmonary:      Effort: Pulmonary effort is normal.      Breath sounds: Normal breath sounds.   Abdominal:      General: Abdomen is flat. Bowel sounds are normal. There is no distension.      Palpations: Abdomen is soft.      Tenderness: There is no abdominal tenderness.   Musculoskeletal:         General: Swelling present.      Cervical back: Normal range of motion.      Left lower leg: Edema present.   Skin:     Capillary Refill: Capillary refill takes more than 3 seconds.      Findings: Erythema present.      Comments: 2 wounds to left plantar foot  Left foot cool, cyanosis  Erythema of leg  Rash on the back: small diffuse ulcerated weaping rash    Neurological:      Mental Status: He is alert and oriented to person, place, and time.   Psychiatric:         Mood and Affect: Mood normal.         Behavior: Behavior normal.         Fluids    Intake/Output Summary (Last 24 hours) at 3/13/2021 1530  Last data filed at 3/13/2021 1502  Gross per 24 hour   Intake 732 ml   Output --   Net 732 ml       Laboratory  Recent Labs     03/12/21  1535 03/13/21 0419   WBC 10.6 10.2   RBC 5.23 5.43   HEMOGLOBIN 16.3 16.7   HEMATOCRIT 49.7 51.0   MCV 95.0 93.9   MCH 31.2 30.8   MCHC 32.8* 32.7*   RDW 45.9 45.0   PLATELETCT 297 267   MPV 9.4 8.8*     Recent Labs     03/12/21  2135 03/13/21 0419   SODIUM 134* 135   POTASSIUM 4.2 4.1   CHLORIDE 101 98   CO2 25 25   GLUCOSE 97 100*   BUN 9 9   CREATININE 0.58 0.57   CALCIUM 10.2 10.1     Recent Labs     03/12/21  1535   APTT 96.5*   INR 0.98         Recent Labs     03/13/21 0419   TRIGLYCERIDE 86   HDL 48   LDL 88       Imaging  CT-FOREIGN FILM CAT SCAN   Final Result      DX-PORTABLE FLUORO > 1 HOUR    (Results  Pending)        Assessment/Plan  * Femoral artery occlusion (HCC)  Assessment & Plan  Patient is currently on a heparin drip which we will continue.  Vascular surgery has been consulted, going for bypass surgery today  Risk factors include tobacco use, hypertension, and dyslipidemia as well as age and male sex.  I had a conversation with him tonight about smoking cessation he does appear motivated.    Mixed hyperlipidemia- (present on admission)  Assessment & Plan  Check fasting lipid panel  Is not currently on a statin    Polycythemia- (present on admission)  Assessment & Plan  Patient has a history of polycythemia, however he has never been phlebotomized.  Hemoglobin here is 17    Current every day smoker- (present on admission)  Assessment & Plan  I counseled the patient x10 minutes.  He has been smoking for many years but has been trying to slowly quit.  He is currently down to about 10 cigarettes daily.  He has not had any luck with Chantix, he has tried patches but has not tried gum.  As needed replacement  Continue to support and encourage him.    Essential hypertension- (present on admission)  Assessment & Plan  Continue lisinopril  Monitor and titrate       VTE prophylaxis: heparin gtt

## 2021-03-13 NOTE — PROGRESS NOTES
"Assumed care of patient from night shift RN.  Patient is alert and oriented times 4, states pain of 5/10, will medicate per MAR.  VSS /86   Pulse 68   Temp 36.8 °C (98.3 °F) (Temporal)   Resp 17   Ht 1.956 m (6' 5\")   Wt 94.6 kg (208 lb 8.9 oz)   SpO2 90%   BMI 24.73 kg/m²   PIV in the RAC, patent and running heparin drip at 20u/kg/hr.  Next heparin Xa scheduled for 1020.  PIV in the LAC, patent and running LR at 83mL/hr.  On RA with saturations in the mid 90s.   Last BM 3/12, urinating without difficulty.  NPO for surgery today.  Redness and swelling to the LLE, no palpable pulse.  Dopplar pulse noted to the posterior tibial.  Wound to the plantar surface of the L foot.  Wound consult in place.  Patient is up with a SBA, demonstrates steady gait, no assistance needed.  POC discussed for the day, bed is locked and in the lowest position, call light is within reach.  All needs are met at this time, hourly rounding is in place.    Report given to pre-op RN, CHG wipes done.  Pre-op checklist partially complete.  "

## 2021-03-13 NOTE — ASSESSMENT & PLAN NOTE
I counseled the patient x10 minutes.  He has been smoking for many years but has been trying to slowly quit.  He is currently down to about 10 cigarettes daily.  He has not had any luck with Chantix, he has tried patches but has not tried gum.  As needed replacement  Continue to support and encourage him.

## 2021-03-13 NOTE — WOUND TEAM
Wound consult received, Pt going down for surgery today with dr cleveland for revascularization, per MD he may scrape left wound and irrigate it,  wound team to see pt for his left foot wound after surgery.  Updated pilar quijano RN.

## 2021-03-14 ENCOUNTER — APPOINTMENT (OUTPATIENT)
Dept: RADIOLOGY | Facility: MEDICAL CENTER | Age: 61
DRG: 253 | End: 2021-03-14
Attending: SURGERY
Payer: OTHER MISCELLANEOUS

## 2021-03-14 ENCOUNTER — ANESTHESIA (OUTPATIENT)
Dept: SURGERY | Facility: MEDICAL CENTER | Age: 61
DRG: 253 | End: 2021-03-14
Payer: OTHER MISCELLANEOUS

## 2021-03-14 PROBLEM — E87.1 HYPONATREMIA: Status: ACTIVE | Noted: 2021-03-14

## 2021-03-14 PROBLEM — R06.89 ACUTE RESPIRATORY INSUFFICIENCY: Status: ACTIVE | Noted: 2021-03-14

## 2021-03-14 LAB
ACT BLD: 219 SEC (ref 74–137)
ACTION RANGE TRIGGERED IACRT: NO
ANION GAP SERPL CALC-SCNC: 7 MMOL/L (ref 7–16)
BASE EXCESS BLDA CALC-SCNC: -1 MMOL/L (ref -4–3)
BASOPHILS # BLD AUTO: 0.2 % (ref 0–1.8)
BASOPHILS # BLD: 0.03 K/UL (ref 0–0.12)
BODY TEMPERATURE: ABNORMAL DEGREES
BUN SERPL-MCNC: 7 MG/DL (ref 8–22)
CA-I BLD ISE-SCNC: 1.19 MMOL/L (ref 1.1–1.3)
CALCIUM SERPL-MCNC: 9.8 MG/DL (ref 8.5–10.5)
CHLORIDE SERPL-SCNC: 95 MMOL/L (ref 96–112)
CO2 BLDA-SCNC: 25 MMOL/L (ref 20–33)
CO2 SERPL-SCNC: 27 MMOL/L (ref 20–33)
CREAT SERPL-MCNC: 0.57 MG/DL (ref 0.5–1.4)
EOSINOPHIL # BLD AUTO: 0.04 K/UL (ref 0–0.51)
EOSINOPHIL NFR BLD: 0.3 % (ref 0–6.9)
ERYTHROCYTE [DISTWIDTH] IN BLOOD BY AUTOMATED COUNT: 45.9 FL (ref 35.9–50)
ERYTHROCYTE [DISTWIDTH] IN BLOOD BY AUTOMATED COUNT: 47 FL (ref 35.9–50)
GLUCOSE SERPL-MCNC: 138 MG/DL (ref 65–99)
HCO3 BLDA-SCNC: 23.8 MMOL/L (ref 17–25)
HCT VFR BLD AUTO: 44.8 % (ref 42–52)
HCT VFR BLD AUTO: 46.9 % (ref 42–52)
HCT VFR BLD CALC: 39 % (ref 42–52)
HGB BLD-MCNC: 13.3 G/DL (ref 14–18)
HGB BLD-MCNC: 14.8 G/DL (ref 14–18)
HGB BLD-MCNC: 15.3 G/DL (ref 14–18)
HOROWITZ INDEX BLDA+IHG-RTO: 334 MM[HG]
IMM GRANULOCYTES # BLD AUTO: 0.07 K/UL (ref 0–0.11)
IMM GRANULOCYTES NFR BLD AUTO: 0.5 % (ref 0–0.9)
INST. QUALIFIED PATIENT IIQPT: YES
LYMPHOCYTES # BLD AUTO: 0.77 K/UL (ref 1–4.8)
LYMPHOCYTES NFR BLD: 5.9 % (ref 22–41)
MCH RBC QN AUTO: 31.8 PG (ref 27–33)
MCH RBC QN AUTO: 31.8 PG (ref 27–33)
MCHC RBC AUTO-ENTMCNC: 32.6 G/DL (ref 33.7–35.3)
MCHC RBC AUTO-ENTMCNC: 33 G/DL (ref 33.7–35.3)
MCV RBC AUTO: 96.1 FL (ref 81.4–97.8)
MCV RBC AUTO: 97.5 FL (ref 81.4–97.8)
MONOCYTES # BLD AUTO: 1 K/UL (ref 0–0.85)
MONOCYTES NFR BLD AUTO: 7.6 % (ref 0–13.4)
NEUTROPHILS # BLD AUTO: 11.19 K/UL (ref 1.82–7.42)
NEUTROPHILS NFR BLD: 85.5 % (ref 44–72)
NRBC # BLD AUTO: 0 K/UL
NRBC BLD-RTO: 0 /100 WBC
O2/TOTAL GAS SETTING VFR VENT: 50 %
PCO2 BLDA: 38.3 MMHG (ref 26–37)
PCO2 TEMP ADJ BLDA: 38.3 MMHG (ref 26–37)
PH BLDA: 7.4 [PH] (ref 7.4–7.5)
PH TEMP ADJ BLDA: 7.4 [PH] (ref 7.4–7.5)
PLATELET # BLD AUTO: 238 K/UL (ref 164–446)
PLATELET # BLD AUTO: 248 K/UL (ref 164–446)
PMV BLD AUTO: 9.1 FL (ref 9–12.9)
PMV BLD AUTO: 9.2 FL (ref 9–12.9)
PO2 BLDA: 167 MMHG (ref 64–87)
PO2 TEMP ADJ BLDA: 167 MMHG (ref 64–87)
POTASSIUM BLD-SCNC: 3.6 MMOL/L (ref 3.6–5.5)
POTASSIUM SERPL-SCNC: 4.3 MMOL/L (ref 3.6–5.5)
RBC # BLD AUTO: 4.66 M/UL (ref 4.7–6.1)
RBC # BLD AUTO: 4.81 M/UL (ref 4.7–6.1)
SAO2 % BLDA: 100 % (ref 93–99)
SODIUM BLD-SCNC: 134 MMOL/L (ref 135–145)
SODIUM SERPL-SCNC: 129 MMOL/L (ref 135–145)
SPECIMEN DRAWN FROM PATIENT: ABNORMAL
WBC # BLD AUTO: 12.8 K/UL (ref 4.8–10.8)
WBC # BLD AUTO: 13.1 K/UL (ref 4.8–10.8)

## 2021-03-14 PROCEDURE — 82803 BLOOD GASES ANY COMBINATION: CPT

## 2021-03-14 PROCEDURE — 700117 HCHG RX CONTRAST REV CODE 255: Performed by: SURGERY

## 2021-03-14 PROCEDURE — 700111 HCHG RX REV CODE 636 W/ 250 OVERRIDE (IP): Performed by: SURGERY

## 2021-03-14 PROCEDURE — A9270 NON-COVERED ITEM OR SERVICE: HCPCS | Performed by: SURGERY

## 2021-03-14 PROCEDURE — 80048 BASIC METABOLIC PNL TOTAL CA: CPT

## 2021-03-14 PROCEDURE — 84295 ASSAY OF SERUM SODIUM: CPT

## 2021-03-14 PROCEDURE — 700111 HCHG RX REV CODE 636 W/ 250 OVERRIDE (IP): Performed by: ANESTHESIOLOGY

## 2021-03-14 PROCEDURE — 160009 HCHG ANES TIME/MIN: Performed by: SURGERY

## 2021-03-14 PROCEDURE — 84132 ASSAY OF SERUM POTASSIUM: CPT

## 2021-03-14 PROCEDURE — 700105 HCHG RX REV CODE 258: Performed by: ANESTHESIOLOGY

## 2021-03-14 PROCEDURE — 36415 COLL VENOUS BLD VENIPUNCTURE: CPT

## 2021-03-14 PROCEDURE — A9270 NON-COVERED ITEM OR SERVICE: HCPCS | Performed by: ANESTHESIOLOGY

## 2021-03-14 PROCEDURE — 700101 HCHG RX REV CODE 250: Performed by: SURGERY

## 2021-03-14 PROCEDURE — 160002 HCHG RECOVERY MINUTES (STAT): Performed by: SURGERY

## 2021-03-14 PROCEDURE — 94760 N-INVAS EAR/PLS OXIMETRY 1: CPT

## 2021-03-14 PROCEDURE — 04WY07Z REVISION OF AUTOLOGOUS TISSUE SUBSTITUTE IN LOWER ARTERY, OPEN APPROACH: ICD-10-PCS | Performed by: SURGERY

## 2021-03-14 PROCEDURE — 85014 HEMATOCRIT: CPT

## 2021-03-14 PROCEDURE — 770006 HCHG ROOM/CARE - MED/SURG/GYN SEMI*

## 2021-03-14 PROCEDURE — 160029 HCHG SURGERY MINUTES - 1ST 30 MINS LEVEL 4: Performed by: SURGERY

## 2021-03-14 PROCEDURE — 700102 HCHG RX REV CODE 250 W/ 637 OVERRIDE(OP): Performed by: SURGERY

## 2021-03-14 PROCEDURE — 85347 COAGULATION TIME ACTIVATED: CPT

## 2021-03-14 PROCEDURE — 700101 HCHG RX REV CODE 250: Performed by: ANESTHESIOLOGY

## 2021-03-14 PROCEDURE — 85025 COMPLETE CBC W/AUTO DIFF WBC: CPT

## 2021-03-14 PROCEDURE — 82330 ASSAY OF CALCIUM: CPT

## 2021-03-14 PROCEDURE — 85027 COMPLETE CBC AUTOMATED: CPT

## 2021-03-14 PROCEDURE — 501838 HCHG SUTURE GENERAL: Performed by: SURGERY

## 2021-03-14 PROCEDURE — 160048 HCHG OR STATISTICAL LEVEL 1-5: Performed by: SURGERY

## 2021-03-14 PROCEDURE — 160035 HCHG PACU - 1ST 60 MINS PHASE I: Performed by: SURGERY

## 2021-03-14 PROCEDURE — 04CS0ZZ EXTIRPATION OF MATTER FROM LEFT POSTERIOR TIBIAL ARTERY, OPEN APPROACH: ICD-10-PCS | Performed by: SURGERY

## 2021-03-14 PROCEDURE — 501837 HCHG SUTURE CV: Performed by: SURGERY

## 2021-03-14 PROCEDURE — B41GZZZ FLUOROSCOPY OF LEFT LOWER EXTREMITY ARTERIES: ICD-10-PCS | Performed by: SURGERY

## 2021-03-14 PROCEDURE — 700102 HCHG RX REV CODE 250 W/ 637 OVERRIDE(OP): Performed by: ANESTHESIOLOGY

## 2021-03-14 PROCEDURE — 110454 HCHG SHELL REV 250: Performed by: SURGERY

## 2021-03-14 PROCEDURE — 700105 HCHG RX REV CODE 258: Performed by: SURGERY

## 2021-03-14 PROCEDURE — C1757 CATH, THROMBECTOMY/EMBOLECT: HCPCS | Performed by: SURGERY

## 2021-03-14 PROCEDURE — 700102 HCHG RX REV CODE 250 W/ 637 OVERRIDE(OP): Performed by: HOSPITALIST

## 2021-03-14 PROCEDURE — 160041 HCHG SURGERY MINUTES - EA ADDL 1 MIN LEVEL 4: Performed by: SURGERY

## 2021-03-14 PROCEDURE — 99233 SBSQ HOSP IP/OBS HIGH 50: CPT | Performed by: INTERNAL MEDICINE

## 2021-03-14 PROCEDURE — A9270 NON-COVERED ITEM OR SERVICE: HCPCS | Performed by: HOSPITALIST

## 2021-03-14 RX ORDER — OXYCODONE HCL 5 MG/5 ML
5 SOLUTION, ORAL ORAL
Status: DISCONTINUED | OUTPATIENT
Start: 2021-03-14 | End: 2021-03-14 | Stop reason: HOSPADM

## 2021-03-14 RX ORDER — HYDROMORPHONE HYDROCHLORIDE 1 MG/ML
0.4 INJECTION, SOLUTION INTRAMUSCULAR; INTRAVENOUS; SUBCUTANEOUS
Status: DISCONTINUED | OUTPATIENT
Start: 2021-03-14 | End: 2021-03-14 | Stop reason: HOSPADM

## 2021-03-14 RX ORDER — PROTAMINE SULFATE 10 MG/ML
INJECTION, SOLUTION INTRAVENOUS PRN
Status: DISCONTINUED | OUTPATIENT
Start: 2021-03-14 | End: 2021-03-14 | Stop reason: SURG

## 2021-03-14 RX ORDER — LIDOCAINE HYDROCHLORIDE 40 MG/ML
SOLUTION TOPICAL PRN
Status: DISCONTINUED | OUTPATIENT
Start: 2021-03-14 | End: 2021-03-14 | Stop reason: SURG

## 2021-03-14 RX ORDER — KETOROLAC TROMETHAMINE 30 MG/ML
INJECTION, SOLUTION INTRAMUSCULAR; INTRAVENOUS PRN
Status: DISCONTINUED | OUTPATIENT
Start: 2021-03-14 | End: 2021-03-14 | Stop reason: SURG

## 2021-03-14 RX ORDER — ONDANSETRON 2 MG/ML
INJECTION INTRAMUSCULAR; INTRAVENOUS PRN
Status: DISCONTINUED | OUTPATIENT
Start: 2021-03-14 | End: 2021-03-14 | Stop reason: SURG

## 2021-03-14 RX ORDER — OXYCODONE HCL 5 MG/5 ML
10 SOLUTION, ORAL ORAL
Status: DISCONTINUED | OUTPATIENT
Start: 2021-03-14 | End: 2021-03-14 | Stop reason: HOSPADM

## 2021-03-14 RX ORDER — HALOPERIDOL 5 MG/ML
1 INJECTION INTRAMUSCULAR
Status: DISCONTINUED | OUTPATIENT
Start: 2021-03-14 | End: 2021-03-14 | Stop reason: HOSPADM

## 2021-03-14 RX ORDER — SUCCINYLCHOLINE/SOD CL,ISO/PF 200MG/10ML
SYRINGE (ML) INTRAVENOUS PRN
Status: DISCONTINUED | OUTPATIENT
Start: 2021-03-14 | End: 2021-03-14 | Stop reason: SURG

## 2021-03-14 RX ORDER — GABAPENTIN 300 MG/1
300 CAPSULE ORAL ONCE
Status: COMPLETED | OUTPATIENT
Start: 2021-03-14 | End: 2021-03-14

## 2021-03-14 RX ORDER — DIPHENHYDRAMINE HYDROCHLORIDE 50 MG/ML
12.5 INJECTION INTRAMUSCULAR; INTRAVENOUS
Status: DISCONTINUED | OUTPATIENT
Start: 2021-03-14 | End: 2021-03-14 | Stop reason: HOSPADM

## 2021-03-14 RX ORDER — ACETAMINOPHEN 500 MG
1000 TABLET ORAL ONCE
Status: COMPLETED | OUTPATIENT
Start: 2021-03-14 | End: 2021-03-14

## 2021-03-14 RX ORDER — SODIUM CHLORIDE, SODIUM LACTATE, POTASSIUM CHLORIDE, CALCIUM CHLORIDE 600; 310; 30; 20 MG/100ML; MG/100ML; MG/100ML; MG/100ML
INJECTION, SOLUTION INTRAVENOUS CONTINUOUS
Status: DISCONTINUED | OUTPATIENT
Start: 2021-03-14 | End: 2021-03-14 | Stop reason: HOSPADM

## 2021-03-14 RX ORDER — KETAMINE HYDROCHLORIDE 50 MG/ML
INJECTION, SOLUTION INTRAMUSCULAR; INTRAVENOUS PRN
Status: DISCONTINUED | OUTPATIENT
Start: 2021-03-14 | End: 2021-03-14 | Stop reason: SURG

## 2021-03-14 RX ORDER — LABETALOL HYDROCHLORIDE 5 MG/ML
5 INJECTION, SOLUTION INTRAVENOUS
Status: DISCONTINUED | OUTPATIENT
Start: 2021-03-14 | End: 2021-03-14 | Stop reason: HOSPADM

## 2021-03-14 RX ORDER — CEFAZOLIN SODIUM 1 G/3ML
INJECTION, POWDER, FOR SOLUTION INTRAMUSCULAR; INTRAVENOUS PRN
Status: DISCONTINUED | OUTPATIENT
Start: 2021-03-14 | End: 2021-03-14 | Stop reason: SURG

## 2021-03-14 RX ORDER — HYDRALAZINE HYDROCHLORIDE 20 MG/ML
5 INJECTION INTRAMUSCULAR; INTRAVENOUS
Status: DISCONTINUED | OUTPATIENT
Start: 2021-03-14 | End: 2021-03-14 | Stop reason: HOSPADM

## 2021-03-14 RX ORDER — HEPARIN SODIUM 1000 [USP'U]/ML
INJECTION, SOLUTION INTRAVENOUS; SUBCUTANEOUS PRN
Status: DISCONTINUED | OUTPATIENT
Start: 2021-03-14 | End: 2021-03-14 | Stop reason: SURG

## 2021-03-14 RX ORDER — MIDAZOLAM HYDROCHLORIDE 1 MG/ML
INJECTION INTRAMUSCULAR; INTRAVENOUS PRN
Status: DISCONTINUED | OUTPATIENT
Start: 2021-03-14 | End: 2021-03-14 | Stop reason: SURG

## 2021-03-14 RX ORDER — SODIUM CHLORIDE, SODIUM LACTATE, POTASSIUM CHLORIDE, CALCIUM CHLORIDE 600; 310; 30; 20 MG/100ML; MG/100ML; MG/100ML; MG/100ML
INJECTION, SOLUTION INTRAVENOUS
Status: DISCONTINUED | OUTPATIENT
Start: 2021-03-14 | End: 2021-03-14 | Stop reason: SURG

## 2021-03-14 RX ORDER — DEXAMETHASONE SODIUM PHOSPHATE 4 MG/ML
INJECTION, SOLUTION INTRA-ARTICULAR; INTRALESIONAL; INTRAMUSCULAR; INTRAVENOUS; SOFT TISSUE PRN
Status: DISCONTINUED | OUTPATIENT
Start: 2021-03-14 | End: 2021-03-14 | Stop reason: SURG

## 2021-03-14 RX ORDER — ONDANSETRON 2 MG/ML
4 INJECTION INTRAMUSCULAR; INTRAVENOUS
Status: DISCONTINUED | OUTPATIENT
Start: 2021-03-14 | End: 2021-03-14 | Stop reason: HOSPADM

## 2021-03-14 RX ORDER — HYDROMORPHONE HYDROCHLORIDE 1 MG/ML
0.1 INJECTION, SOLUTION INTRAMUSCULAR; INTRAVENOUS; SUBCUTANEOUS
Status: DISCONTINUED | OUTPATIENT
Start: 2021-03-14 | End: 2021-03-14 | Stop reason: HOSPADM

## 2021-03-14 RX ORDER — HYDROMORPHONE HYDROCHLORIDE 1 MG/ML
0.2 INJECTION, SOLUTION INTRAMUSCULAR; INTRAVENOUS; SUBCUTANEOUS
Status: DISCONTINUED | OUTPATIENT
Start: 2021-03-14 | End: 2021-03-14 | Stop reason: HOSPADM

## 2021-03-14 RX ORDER — MIDAZOLAM HYDROCHLORIDE 1 MG/ML
1 INJECTION INTRAMUSCULAR; INTRAVENOUS
Status: DISCONTINUED | OUTPATIENT
Start: 2021-03-14 | End: 2021-03-14 | Stop reason: HOSPADM

## 2021-03-14 RX ORDER — IODIXANOL 270 MG/ML
INJECTION, SOLUTION INTRAVASCULAR
Status: DISCONTINUED | OUTPATIENT
Start: 2021-03-14 | End: 2021-03-14 | Stop reason: HOSPADM

## 2021-03-14 RX ORDER — PHENYLEPHRINE HCL IN 0.9% NACL 0.5 MG/5ML
SYRINGE (ML) INTRAVENOUS PRN
Status: DISCONTINUED | OUTPATIENT
Start: 2021-03-14 | End: 2021-03-14 | Stop reason: SURG

## 2021-03-14 RX ORDER — SODIUM CHLORIDE 9 MG/ML
1000 INJECTION, SOLUTION INTRAVENOUS ONCE
Status: DISCONTINUED | OUTPATIENT
Start: 2021-03-14 | End: 2021-03-14

## 2021-03-14 RX ORDER — LIDOCAINE HYDROCHLORIDE 20 MG/ML
INJECTION, SOLUTION EPIDURAL; INFILTRATION; INTRACAUDAL; PERINEURAL PRN
Status: DISCONTINUED | OUTPATIENT
Start: 2021-03-14 | End: 2021-03-14 | Stop reason: SURG

## 2021-03-14 RX ADMIN — Medication 200 MCG: at 19:20

## 2021-03-14 RX ADMIN — FENTANYL CITRATE 50 MCG: 50 INJECTION, SOLUTION INTRAMUSCULAR; INTRAVENOUS at 20:09

## 2021-03-14 RX ADMIN — EPHEDRINE SULFATE 10 MG: 50 INJECTION, SOLUTION INTRAVENOUS at 18:38

## 2021-03-14 RX ADMIN — LIDOCAINE HYDROCHLORIDE 4 ML: 40 SOLUTION TOPICAL at 17:13

## 2021-03-14 RX ADMIN — Medication 200 MCG: at 17:49

## 2021-03-14 RX ADMIN — HEPARIN SODIUM 8000 UNITS: 1000 INJECTION, SOLUTION INTRAVENOUS; SUBCUTANEOUS at 17:39

## 2021-03-14 RX ADMIN — LIDOCAINE HYDROCHLORIDE 100 MG: 20 INJECTION, SOLUTION EPIDURAL; INFILTRATION; INTRACAUDAL at 17:09

## 2021-03-14 RX ADMIN — DEXAMETHASONE SODIUM PHOSPHATE 10 MG: 4 INJECTION, SOLUTION INTRA-ARTICULAR; INTRALESIONAL; INTRAMUSCULAR; INTRAVENOUS; SOFT TISSUE at 17:15

## 2021-03-14 RX ADMIN — EPHEDRINE SULFATE 10 MG: 50 INJECTION, SOLUTION INTRAVENOUS at 17:59

## 2021-03-14 RX ADMIN — Medication 200 MCG: at 17:42

## 2021-03-14 RX ADMIN — EPHEDRINE SULFATE 10 MG: 50 INJECTION, SOLUTION INTRAVENOUS at 18:11

## 2021-03-14 RX ADMIN — Medication 94.6 MG: at 17:13

## 2021-03-14 RX ADMIN — Medication 200 MCG: at 19:27

## 2021-03-14 RX ADMIN — ACETAMINOPHEN 1000 MG: 500 TABLET, FILM COATED ORAL at 05:41

## 2021-03-14 RX ADMIN — OXYCODONE HYDROCHLORIDE 10 MG: 10 TABLET ORAL at 22:17

## 2021-03-14 RX ADMIN — ONDANSETRON 4 MG: 2 INJECTION INTRAMUSCULAR; INTRAVENOUS at 19:45

## 2021-03-14 RX ADMIN — Medication 100 MCG: at 18:44

## 2021-03-14 RX ADMIN — EPHEDRINE SULFATE 10 MG: 50 INJECTION, SOLUTION INTRAVENOUS at 18:31

## 2021-03-14 RX ADMIN — KETAMINE HYDROCHLORIDE 100 MG: 50 INJECTION INTRAMUSCULAR; INTRAVENOUS at 17:15

## 2021-03-14 RX ADMIN — PROPOFOL 200 MG: 10 INJECTION, EMULSION INTRAVENOUS at 17:13

## 2021-03-14 RX ADMIN — OXYCODONE HYDROCHLORIDE 10 MG: 10 TABLET ORAL at 15:55

## 2021-03-14 RX ADMIN — Medication 100 MCG: at 18:07

## 2021-03-14 RX ADMIN — GABAPENTIN 300 MG: 300 CAPSULE ORAL at 16:52

## 2021-03-14 RX ADMIN — Medication 100 MCG: at 18:31

## 2021-03-14 RX ADMIN — Medication 100 MCG: at 18:13

## 2021-03-14 RX ADMIN — Medication 100 MCG: at 18:54

## 2021-03-14 RX ADMIN — ACETAMINOPHEN 1000 MG: 500 TABLET, FILM COATED ORAL at 11:33

## 2021-03-14 RX ADMIN — SODIUM CHLORIDE, POTASSIUM CHLORIDE, SODIUM LACTATE AND CALCIUM CHLORIDE: 600; 310; 30; 20 INJECTION, SOLUTION INTRAVENOUS at 19:04

## 2021-03-14 RX ADMIN — PROPOFOL 50 MG: 10 INJECTION, EMULSION INTRAVENOUS at 20:09

## 2021-03-14 RX ADMIN — FENTANYL CITRATE 50 MCG: 50 INJECTION, SOLUTION INTRAMUSCULAR; INTRAVENOUS at 11:40

## 2021-03-14 RX ADMIN — HEPARIN SODIUM 5000 UNITS: 1000 INJECTION, SOLUTION INTRAVENOUS; SUBCUTANEOUS at 19:05

## 2021-03-14 RX ADMIN — Medication 200 MCG: at 17:38

## 2021-03-14 RX ADMIN — Medication 200 MCG: at 17:19

## 2021-03-14 RX ADMIN — CEFAZOLIN 2 G: 330 INJECTION, POWDER, FOR SOLUTION INTRAMUSCULAR; INTRAVENOUS at 17:15

## 2021-03-14 RX ADMIN — MIDAZOLAM HYDROCHLORIDE 2 MG: 1 INJECTION, SOLUTION INTRAMUSCULAR; INTRAVENOUS at 17:05

## 2021-03-14 RX ADMIN — KETOROLAC TROMETHAMINE 30 MG: 30 INJECTION, SOLUTION INTRAMUSCULAR at 19:45

## 2021-03-14 RX ADMIN — SODIUM CHLORIDE, POTASSIUM CHLORIDE, SODIUM LACTATE AND CALCIUM CHLORIDE: 600; 310; 30; 20 INJECTION, SOLUTION INTRAVENOUS at 17:04

## 2021-03-14 RX ADMIN — KETOROLAC TROMETHAMINE 30 MG: 30 INJECTION, SOLUTION INTRAMUSCULAR at 05:41

## 2021-03-14 RX ADMIN — Medication 100 MCG: at 18:19

## 2021-03-14 RX ADMIN — Medication 100 MCG: at 18:22

## 2021-03-14 RX ADMIN — ACETAMINOPHEN 1000 MG: 500 TABLET, FILM COATED ORAL at 16:52

## 2021-03-14 RX ADMIN — Medication 200 MCG: at 17:32

## 2021-03-14 RX ADMIN — HEPARIN SODIUM 2000 UNITS: 1000 INJECTION, SOLUTION INTRAVENOUS; SUBCUTANEOUS at 18:03

## 2021-03-14 RX ADMIN — FENTANYL CITRATE 50 MCG: 50 INJECTION, SOLUTION INTRAMUSCULAR; INTRAVENOUS at 20:17

## 2021-03-14 RX ADMIN — KETOROLAC TROMETHAMINE 30 MG: 30 INJECTION, SOLUTION INTRAMUSCULAR at 11:33

## 2021-03-14 RX ADMIN — Medication 100 MCG: at 18:38

## 2021-03-14 RX ADMIN — EPHEDRINE SULFATE 10 MG: 50 INJECTION, SOLUTION INTRAVENOUS at 18:20

## 2021-03-14 RX ADMIN — Medication 100 MCG: at 19:33

## 2021-03-14 RX ADMIN — GABAPENTIN 300 MG: 300 CAPSULE ORAL at 22:17

## 2021-03-14 RX ADMIN — Medication 200 MCG: at 18:00

## 2021-03-14 RX ADMIN — EPHEDRINE SULFATE 10 MG: 50 INJECTION, SOLUTION INTRAVENOUS at 19:33

## 2021-03-14 RX ADMIN — Medication 100 MCG: at 18:51

## 2021-03-14 RX ADMIN — PROTAMINE SULFATE 30 MG: 10 INJECTION, SOLUTION INTRAVENOUS at 19:45

## 2021-03-14 RX ADMIN — PROPOFOL 50 MG: 10 INJECTION, EMULSION INTRAVENOUS at 20:16

## 2021-03-14 RX ADMIN — Medication 100 MCG: at 19:04

## 2021-03-14 ASSESSMENT — PAIN DESCRIPTION - PAIN TYPE
TYPE: SURGICAL PAIN
TYPE: ACUTE PAIN;SURGICAL PAIN
TYPE: SURGICAL PAIN
TYPE: ACUTE PAIN;SURGICAL PAIN
TYPE: ACUTE PAIN;SURGICAL PAIN

## 2021-03-14 ASSESSMENT — ENCOUNTER SYMPTOMS
EYE DISCHARGE: 0
BLURRED VISION: 0
VOMITING: 0
FEVER: 0
SHORTNESS OF BREATH: 0
CHILLS: 0
EYE REDNESS: 0
SENSORY CHANGE: 1

## 2021-03-14 NOTE — WOUND TEAM
"[facility]  Inpatient Services  Initial Wound and Skin Care Evaluation    Admission Date: 3/12/2021     Last order of IP CONSULT TO WOUND CARE was found on 3/12/2021 from Hospital Encounter on 3/12/2021     HPI, PMH, SH: Reviewed    Past Surgical History:   Procedure Laterality Date   • HAMMERTOE CORRECTION       Social History     Tobacco Use   • Smoking status: Current Every Day Smoker     Packs/day: 1.00     Years: 45.00     Pack years: 45.00     Types: Cigarettes   • Smokeless tobacco: Never Used   • Tobacco comment: 20 cig/day   Substance Use Topics   • Alcohol use: Yes     Alcohol/week: 3.0 - 3.6 oz     Types: 5 - 6 Cans of beer per week     Comment: 1-6 beers/day      Chief Complaint   Patient presents with   • Leg Swelling     Transfer from [facility] for L LE clot.  Leg is discolored and swollen x 1 week, pain with palpation.  U/S showed L arterial occlusion Arrived on heparin gtt.   • Open Wound     L lateral foot 1x1 wound, macerated edges.     Diagnosis: Femoral artery occlusion (HCC) [I70.209]    Unit where seen by Wound Team: [id number]     WOUND CONSULT/FOLLOW UP RELATED TO:  Left plantar foot     WOUND HISTORY:  Pt is an older gentleman transferred from [facility] related to LLE Clot. Pt has a known wound to the left plantar foot as well. Per pt he was having wound treated by a podiatrist who removed what patient referred to as a \"Painful callused volcano\" with hair growing from it. Wound was initially debrided by podiatrist and pt reports there was then a cratered wound which he was caring for at home. Wound was debrided in OR during re-vascularization and was covered with betadine soaked gauze.     WOUND ASSESSMENT/LDA  Wound 03/13/21 Full Thickness Wound Foot Distal;Plantar Left (Active)   Wound Image    03/14/21 1100   Site Assessment Bleeding;Red    Periwound Assessment Clean;Dry;Intact    Margins Attached edges;Defined edges    Closure Adhesive bandage;Secondary intention  "   Drainage Amount Small    Drainage Description Sanguineous    Treatments Cleansed;Site care;Direct pressure;Offloading;Silver nitrate    Wound Cleansing Approved Wound Cleanser    Periwound Protectant Skin Protectant Wipes to Periwound    Dressing Cleansing/Solutions Not Applicable    Dressing Options Gel Gauze;Dry Gauze;Hypafix Tape    Dressing Changed New    Dressing Status Clean;Dry;Intact    Dressing Change/Treatment Frequency Daily, and As Needed    NEXT Dressing Change/Treatment Date 03/15/21    NEXT Weekly Photo (Inpatient Only) 03/21/21    Non-staged Wound Description Full thickness    Wound Length (cm) 2.2 cm    Wound Width (cm) 1.5 cm    Wound Depth (cm) 1.2 cm    Wound Surface Area (cm^2) 3.3 cm^2    Wound Volume (cm^3) 3.96 cm^3    Shape irregular oval    Wound Odor None    Pulses Not palpable    Exposed Structures Connective tissue;Other (Comments)    WOUND NURSE ONLY - Time Spent with Patient (mins) 60    Number of days: 1     Vascular:    CARLA:   No results found.    Lab Values:    Lab Results   Component Value Date/Time    WBC 12.8 (H) 03/14/2021 08:45 AM    RBC 4.66 (L) 03/14/2021 08:45 AM    HEMOGLOBIN 14.8 03/14/2021 08:45 AM    HEMATOCRIT 44.8 03/14/2021 08:45 AM      Culture Results show:  No results found for this or any previous visit (from the past 720 hour(s)).    Pain Level/Medicated:  Received scheduled Toradol and tylenol as well as PRN IV Fentanyl during assessment       INTERVENTIONS BY WOUND TEAM:  Chart and images reviewed. Discussed with bedside RN. All areas of concern (based on picture review, LDA review and discussion with bedside RN) have been thoroughly assessed. Documentation of areas based on significant findings. This RN in to assess patient. Performed standard wound care which includes appropriate positioning, dressing removal and non-selective debridement. Pictures and measurements obtained weekly if/when required.  Preparation for Dressing removal: Betadine soaked 4x4  gauze and roll gauze removed without difficulty  Cleansed with:  NS and wound cleanser and gauze. Silver nitrated applied to the distal aspect of wound bed where small bleeder was noted.   Sharp debridement: NA  Vanessa wound: Cleansed with NS and gauze, Prepped with No sting skin barrier  Primary Dressing: A piece of hydrogel gauze was applied over wound bed    Secondary (Outer) Dressing: ecured in place with dry gauze and hypafix tape    Interdisciplinary consultation: Patient, Bedside RN (Diana), Vascular Surgery (Dr. Cameron), Wound RN (Cong Ya)    EVALUATION / RATIONALE FOR TREATMENT:  Most Recent Date:  3/14/21: Pt underwent re-vascularization and I&D of left plantar foot wound yesterday. Unable to palpate or doppler pulses today. Waiting on Dr. Cameron to make recommendations. Hydrogel gauze applied to provide for a moist wound bed. Pt may benefit from NPWT application. Foot is extremely painful to touch.     Goals: Steady decrease in wound area and depth weekly.    WOUND TEAM PLAN OF CARE ([X] for frequency of wound follow up,):   Nursing to follow orders written for wound care. Contact wound team if area fails to progress, deteriorates or with any questions/concerns  Dressing changes by wound team:                   Follow up 3 times weekly:   X             NPWT change 3 times weekly:     Follow up 1-2 times weekly:      Follow up Bi-Monthly:                   Follow up as needed:     Other (explain):     NURSING PLAN OF CARE ORDERS (X):  Dressing changes: See Dressing Care orders: X  Skin care: See Skin Care orders:   RN Prevention Protocol: X  Rectal tube care: See Rectal Tube Care orders:   Other orders:    RSKIN:   CURRENTLY IN PLACE (X), APPLIED THIS VISIT (A), ORDERED (O):   Q shift Cody:  X  Q shift pressure point assessments:  X    Surface/Positioning   Pressure redistribution mattress   X         Low Airloss          Bariatric foam      Bariatric IVIS     Waffle cushion        Waffle Overlay    O       Reposition q 2 hours      TAPs Turning system     Z Toni Pillow     Offloading/Redistribution   Sacral Mepilex (Silicone dressing)   ANA  Heel Mepilex (Silicone dressing)    O     Heel float boots (Prevalon boot)             Float Heels off Bed with Pillows           Respiratory NA room air  Silicone O2 tubing         Gray Foam Ear protectors     Cannula fixation Device (Tender )          High flow offloading Clip    Elastic head band offloading device      Anchorfast                                                         Trach with Optifoam split foam             Containment/Moisture Prevention NA continent    Rectal tube or BMS    Purwick/Condom Cath        Lebron Catheter    Barrier wipes           Barrier paste       Antifungal tx      Interdry        Mobilization       Up to chair        Ambulate   X   PT/OT      Nutrition      Dietician        Diabetes Education      PO   X   TF     TPN     NPO   # days     Other        Anticipated discharge plans:   LTACH:        SNF/Rehab:                  Home Health Care:           Outpatient Wound Center:   X         Self/Family Care:        Other:

## 2021-03-14 NOTE — PROGRESS NOTES
Patient AO x 2 (reoriented to AO x 4). Answering questions appropriately but slightly disoriented after 4 hour procedure.  VSS on 4L oxymask.  Albuterol given for wheezing post-op.  Surgical sites x 2 CDI.  Post tibial pulse assessed via doppler.  Able to move affected extremity.  Severe pain treated with IV/PO analgesics.  Patient self-rating tolerable 5/10 and is intermittently dozing in PACU.  POC reviewed, PIVs verified, and safety protocols in place.  Report to [facility] ROSA T4.  Notified SO Elvia of general patient condition.  Tolerating PO clears w/o nausea.    Transported on 4L oxymask with tank at 75%   HPI: 78 y/o male with a h/o VT s/p AICD, AFib on AC, CAD s/p CABGx2, DM2, gout, glaucoma,  HLD, chronic systolic CHF, PVD, CKD 2-3,hx of vasectomy, S/p R femoral endarterectomy on 2/6/2017, came to the ED on 4/20/17 after being sent from Dr. Gamble's office for IV abx due to pain, necrosis, erythema of infected first metatarsal of right foot. Pt states the wound began approximately 3 months ago, and there has been a clear fluid drainage. Pt states he has a hx of recurrent infection in this toe. Had bypass in RLE with Dr. Royal 2 months ago which temporarily helped symptoms but symptoms have been worsening recently.     4/22: Pt seen and examined. No new complaints.   4/23 - feels well. no cp, sob.      MEDICATIONS  (STANDING):  aspirin enteric coated 81milliGRAM(s) Oral at bedtime  lisinopril 5milliGRAM(s) Oral at bedtime  digoxin     Tablet 0.125milliGRAM(s) Oral at bedtime  mexiletine 150milliGRAM(s) Oral every 12 hours  warfarin 2.5milliGRAM(s) Oral <User Schedule>  warfarin 5milliGRAM(s) Oral <User Schedule>  allopurinol 100milliGRAM(s) Oral after dinner  carvedilol 25milliGRAM(s) Oral every 12 hours  dorzolamide 2%/timolol 0.5% Ophthalmic Solution 1Drop(s) Left EYE two times a day  latanoprost 0.005% Ophthalmic Solution 1Drop(s) Left EYE at bedtime  pantoprazole    Tablet 40milliGRAM(s) Oral before breakfast  atorvastatin 5milliGRAM(s) Oral at bedtime  piperacillin/tazobactam IVPB. 3.375Gram(s) IV Intermittent every 8 hours  insulin lispro (HumaLOG) corrective regimen sliding scale  SubCutaneous three times a day before meals  insulin lispro (HumaLOG) corrective regimen sliding scale  SubCutaneous at bedtime  dextrose 5%. 1000milliLiter(s) IV Continuous <Continuous>  dextrose 50% Injectable 12.5Gram(s) IV Push once  dextrose 50% Injectable 25Gram(s) IV Push once  dextrose 50% Injectable 25Gram(s) IV Push once    MEDICATIONS  (PRN):  dextrose Gel 1Dose(s) Oral once PRN Blood Glucose LESS THAN 70 milliGRAM(s)/deciliter  glucagon  Injectable 1milliGRAM(s) IntraMuscular once PRN Glucose LESS THAN 70 milligrams/deciliter    REVIEW OF SYSTEMS:  CONSTITUTIONAL: No weakness, fevers or chills  EYES/ENT: No visual changes;  No vertigo or throat pain   NECK: No pain or stiffness  RESPIRATORY: No cough, wheezing, hemoptysis; No shortness of breath  CARDIOVASCULAR: No chest pain or palpitations  GASTROINTESTINAL: No abdominal or epigastric pain. No nausea, vomiting, or hematemesis; No diarrhea or constipation. No melena or hematochezia.  GENITOURINARY: No dysuria, frequency or hematuria  NEUROLOGICAL: No numbness or weakness  SKIN: Necrotic ulcer of the right 1st metatarsal.  All other review of systems is negative unless indicated above    Vital Signs Last 24 Hrs  T(C): 36.3, Max: 36.9 (04-22 @ 05:48)  T(F): 97.3, Max: 98.4 (04-22 @ 05:48)  HR: 72 (69 - 75)  BP: 129/57 (116/41 - 133/50)  BP(mean): --  RR: 18 (16 - 18)  SpO2: 98% (97% - 98%)    PHYSICAL EXAM:  Constitutional: Well appearing, NAD  HEENT: Atraumatic, ENRIQUETA, Normal, No congestion  Respiratory: Breath Sounds normal b/l, no rales/rhonchi/wheeze  Cardiovascular: Normal S1S2; No murmurs, gallops, or rubs.  Gastrointestinal: Nondistended. Abdomen soft, non tender, Bowel sounds present in all 4 quadrants.  Extremities: Erythema and swelling of medial side of 1st metatarsal of right toe with surrounding black eschar. Lysis of nail of 1st metatarsal, tender to palpation. No edema of lower extremities.   Neurological: AAO x 3, no gross focal motor deficits  Back: No CVA tenderness   Musculoskeletal: non tender  Breasts: Deferred  Genitourinary: deferred  Rectal: Deferred        PT/INR - ( 22 Apr 2017 06:31 )   PT: 28.9 sec;   INR: 2.62 ratio         PTT - ( 22 Apr 2017 06:31 )  PTT:41.4 sec            Blood Culture: Preliminary results: no growth to date    RADIOLOGY/EKG:  EXAM:  FOOT-RIGHT                            PROCEDURE DATE:  04/20/2017        INTERPRETATION:  Right foot    Indication: Back toe infection and swelling, rule out osteomyelitis    IMPRESSION: 3 views demonstrate superficial ulceration at the medial   aspect of the first distal phalanx. No underlying ostial lysis or soft   tissue gas, no visible fracture or dislocation. Vascular calcifications   are noted.      EXAM:  CHEST SINGLE VIEW FRONTAL                            PROCEDURE DATE:  04/20/2017        INTERPRETATION:    INDICATION: History of diabetes great artery disease.    Single frontal view of chest dated 4/20/2017 9:16 PM.  Prior study of   1/26/2017.    FINDINGS /   IMPRESSION:     There is no acute consolidation.  There are no pleural effusion. Patient   is status post sternotomy. There is cardiomegaly. There is dual-lead   pacemaker.    There are degenerative changes.

## 2021-03-14 NOTE — PROGRESS NOTES
[facility] Medicine Daily Progress Note    Date of Service  3/14/2021    Chief Complaint  60 y.o. male admitted 3/12/2021 with leg pain    Hospital Course  60-year-old male with a history of BPH, tobacco use, erectile dysfunction, hypertension, dyslipidemia, polycythemia who presented to [facility] with left leg pain and wound where a CTA was performed which showed occlusion of the femoral artery running distally.  He was transferred to [facility].  He was started on heparin drip.  Vascular surgery was consulted.  Dr. Cameron performed left lower extremity bypass on 3/13/2021, that night noted to have bleeding at left groin site, sand bag applied, vitals stable and H/H stable.  During surgery and postop unable to find pulses on doppler, concern for thrombosis.       Interval Problem Update  POD#1, overnight had some bleeding at left groin, heparin held, sand bag placed, hematoma stable, H/H stable, BP 90s, not able to doppler pulses, Rakesh taking back to OR tonight for thrombectomy and revision.   - pain controlled, does have numbness of leg  - wound care saw patient today  - Initially on 5L NC after surgery, weaned to 3L today  - Na 129    Consultants/Specialty  Vascular surgery    Code Status  Full Code    Disposition  TBD, PT/OT placed    Review of Systems  Review of Systems   Constitutional: Negative for chills and fever.   HENT: Negative for nosebleeds.    Eyes: Negative for blurred vision, discharge and redness.   Respiratory: Negative for shortness of breath.    Cardiovascular: Positive for leg swelling. Negative for chest pain.   Gastrointestinal: Negative for vomiting.   Genitourinary: Negative for hematuria.   Musculoskeletal:        Left leg and foot pain   Skin: Positive for rash (back).        Wound on left foot, plantar surface   Neurological: Positive for sensory change.        Physical Exam  Temp:  [35.9 °C (96.7 °F)-36.9 °C (98.5 °F)] 36.9 °C (98.5 °F)  Pulse:  []  65  Resp:  [11-21] 17  BP: ()/(61-78) 113/69  SpO2:  [92 %-98 %] 97 %    Physical Exam  Vitals and nursing note reviewed.   Constitutional:       General: He is not in acute distress.     Appearance: He is not toxic-appearing or diaphoretic.   HENT:      Head: Normocephalic and atraumatic.      Nose: Nose normal.      Mouth/Throat:      Mouth: Mucous membranes are moist.   Eyes:      General: No scleral icterus.     Conjunctiva/sclera: Conjunctivae normal.   Cardiovascular:      Rate and Rhythm: Normal rate and regular rhythm.      Heart sounds: No murmur. No friction rub. No gallop.    Pulmonary:      Effort: Pulmonary effort is normal.      Breath sounds: Normal breath sounds.   Abdominal:      General: Abdomen is flat. Bowel sounds are normal. There is no distension.      Palpations: Abdomen is soft.      Tenderness: There is no abdominal tenderness.   Musculoskeletal:         General: Swelling present.      Cervical back: Normal range of motion.      Left lower leg: Edema present.      Comments: Sand bag in place on left groin, dressing with small amount of serosang drainage, hematoma present but small, dressing down left leg   Skin:     Capillary Refill: Capillary refill takes more than 3 seconds.      Findings: Erythema present.      Comments: 2 wounds to left plantar foot  Left foot cool, cyanosis  Erythema of leg  Rash on the back: small diffuse ulcerated weaping rash improving   Neurological:      Mental Status: He is alert and oriented to person, place, and time.   Psychiatric:         Mood and Affect: Mood normal.         Behavior: Behavior normal.         Fluids    Intake/Output Summary (Last 24 hours) at 3/14/2021 1428  Last data filed at 3/14/2021 1220  Gross per 24 hour   Intake 2350 ml   Output 4830 ml   Net -2480 ml       Laboratory  Recent Labs     03/13/21  0419 03/14/21  0329 03/14/21  0845   WBC 10.2 13.1* 12.8*   RBC 5.43 4.81 4.66*   HEMOGLOBIN 16.7 15.3 14.8   HEMATOCRIT 51.0 46.9 44.8    MCV 93.9 97.5 96.1   MCH 30.8 31.8 31.8   MCHC 32.7* 32.6* 33.0*   RDW 45.0 47.0 45.9   PLATELETCT 267 238 248   MPV 8.8* 9.1 9.2     Recent Labs     03/12/21  2135 03/13/21  0419 03/14/21  0329   SODIUM 134* 135 129*   POTASSIUM 4.2 4.1 4.3   CHLORIDE 101 98 95*   CO2 25 25 27   GLUCOSE 97 100* 138*   BUN 9 9 7*   CREATININE 0.58 0.57 0.57   CALCIUM 10.2 10.1 9.8     Recent Labs     03/12/21  1535   APTT 96.5*   INR 0.98         Recent Labs     03/13/21  0419   TRIGLYCERIDE 86   HDL 48   LDL 88       Imaging  DX-PORTABLE FLUORO > 1 HOUR   Final Result      Digitized intraoperative radiograph is submitted for review.  This examination is not for diagnostic purpose but for guidance during a surgical procedure.         CT-FOREIGN FILM CAT SCAN   Final Result           Assessment/Plan  * Femoral artery occlusion (HCC)  Assessment & Plan  S/p heparin gtt, stopped after bleeding/hematoma POD#1  Vascular surgery following: s/p LLE bypass c/b thrombosis, going for thrombectomy and revision tonight  Risk factors include tobacco use, hypertension, and dyslipidemia as well as age and male sex.  I had a conversation with him tonight about smoking cessation he does appear motivated.    Hyponatremia  Assessment & Plan  Likely in setting of post op SIADH + heparin in 1/2 NS  On LR  CTM    Acute respiratory insufficiency  Assessment & Plan  After surgery  On NC  IS  Wean as tolerated    Mixed hyperlipidemia- (present on admission)  Assessment & Plan  Check fasting lipid panel  Is not currently on a statin    Polycythemia- (present on admission)  Assessment & Plan  Patient has a history of polycythemia, however he has never been phlebotomized.  Hemoglobin here is 17    Current every day smoker- (present on admission)  Assessment & Plan  I counseled the patient x10 minutes.  He has been smoking for many years but has been trying to slowly quit.  He is currently down to about 10 cigarettes daily.  He has not had any luck with  Chantix, he has tried patches but has not tried gum.  As needed replacement  Continue to support and encourage him.    Essential hypertension- (present on admission)  Assessment & Plan  Continue lisinopril  Monitor and titrate       VTE prophylaxis: heparin gtt

## 2021-03-14 NOTE — ANESTHESIA PREPROCEDURE EVALUATION
"59yo M who had fem pop bypass last night, now with concern for thrombus and back to OR for urgent revision    In preop says has been having worsening pain in his L foot over past couple hours and now is \"unbearable\"    Relevant Problems   PULMONARY   (+) Chronic bronchitis (HCC)      CARDIAC   (+) Essential hypertension   (+) Femoral artery occlusion (HCC)      Other   (+) Acute respiratory insufficiency   (+) Alcohol use disorder, moderate, in controlled environment (HCC) (1-6 beers/day)   (+) Current every day smoker (1 ppd)       Physical Exam    Airway   Mallampati: II  TM distance: >3 FB  Neck ROM: full       Cardiovascular - normal exam  Rhythm: regular  Rate: normal  (-) murmur     Dental - normal exam           Pulmonary   (+) wheezes, rales     Abdominal    Neurological - normal exam               Anesthesia Plan    ASA 3- EMERGENT   ASA physical status 3 criteria: respiratory insufficiency or compromise and alcohol and/or substance dependence or abuseASA physical status emergent criteria: acute ischemia (limb, body part, tissue)    Plan - general       Airway plan will be ETT    (A line)      Induction: intravenous    Postoperative Plan: Postoperative administration of opioids is intended.    Pertinent diagnostic labs and testing reviewed    Informed Consent:    Anesthetic plan and risks discussed with patient.    Use of blood products discussed with: patient whom consented to blood products.         "

## 2021-03-14 NOTE — ANESTHESIA TIME REPORT
Anesthesia Start and Stop Event Times     Date Time Event    3/13/2021 1427 Ready for Procedure     1434 Anesthesia Start     1852 Anesthesia Stop        Responsible Staff  03/13/21    Name Role Begin End    Kj Stanford D.O. Anesth 1434 1852        Preop Diagnosis (Free Text):  Pre-op Diagnosis     FEMORAL ARTERY OCCLUSION, LEFT FOOT WOUND        Preop Diagnosis (Codes):    Post op Diagnosis  Femoral artery occlusion, left (HCC)      Premium Reason  B. 1st Call    Comments:

## 2021-03-14 NOTE — ANESTHESIA POSTPROCEDURE EVALUATION
Patient: Antonio Cantu    Procedure Summary     Date: 03/13/21 Room / Location: [facility] 09 / SURGERY [facility]    Anesthesia Start: 1434 Anesthesia Stop: 1852    Procedures:       CREATION, BYPASS, ARTERIAL, FEMORAL TO POPLITEAL, USING GRAFT (Left Leg Upper)      ANGIOGRAM (Left Leg Upper)      IRRIGATION AND DEBRIDEMENT, WOUND (Left Foot) Diagnosis: (FEMORAL ARTERY OCCLUSION, LEFT FOOT WOUND)    Surgeons: Brian Cameron M.D. Responsible Provider: Kj Stanford D.O.    Anesthesia Type: general ASA Status: 3          Final Anesthesia Type: general  Last vitals  BP   Blood Pressure: 122/72, Arterial BP: 132/67    Temp   36.4 °C (97.5 °F)    Pulse   79   Resp   17    SpO2   96 %      Anesthesia Post Evaluation    Patient location during evaluation: PACU  Patient participation: complete - patient participated  Level of consciousness: awake and alert  Pain score: 5    Airway patency: patent  Anesthetic complications: no  Cardiovascular status: hemodynamically stable  Respiratory status: acceptable  Hydration status: euvolemic    PONV: none          No complications documented.     Nurse Pain Score: 7 (NPRS)

## 2021-03-14 NOTE — PROGRESS NOTES
At 2210 patient was noted to have bleeding at left groin surgical site. Pressure was applied and dressing was changed due to over saturation. IV heparin infusion was stopped. Unable to obtain post tibial or pedal pulse on LLE. Left toes cold and discolored. Page sent out to Dr. Cameron, awaiting call back.    Hematoma noted by patient's left groin incision, which was not apparent when patient arrived to unit.    Dr. Cameron returned phone call and stated to continue to hold IV heparin, change groin surgical dressing, and to place a sandbag on the hematoma.    Pt's pain better managed this morning. Hematoma has decreased in size and no further bleeding at surgical sight.

## 2021-03-14 NOTE — CARE PLAN
Problem: Pain Management  Goal: Pain level will decrease to patient's comfort goal  Outcome: PROGRESSING AS EXPECTED  Note: Pain tolerable with PO narcotics and multimodal medications.     Problem: Fluid Volume:  Goal: Will maintain balanced intake and output  Outcome: PROGRESSING AS EXPECTED  Note: Sandbag placed to the L groin. Bleeding appears to have stopped. Hematoma smaller. PO intake good. Hgb within normal limits.

## 2021-03-14 NOTE — PROGRESS NOTES
Bedside report received. Assessment completed.  Pt is A&O x4. Pt on room air.   Medicating for pain PRN per MAR Pain 4/10  Denies nausea.  BLE and R fingertips numbness and tingling.  Skin LLE is dusky and cool, old drainage and betadine on foot dressing. Love incision, inner thigh incision, femoral incision. Gauze and tegaderm. Pt has sandbag in place in L groin due to a hematoma and bleeding overnight. No new blood or saturation of the dressings.   LDA RAC and LAC   Last BM 3/13/21 . +flatus,   +void.  Regular diet. Tolerates well.   Pt up SBA.  Call light and belongings within reach. All needs met at this time. Fall Precautions and hourly rounding in place.

## 2021-03-14 NOTE — PROGRESS NOTES
Vascular    Patient feeling well overall  Mild left groin swelling and ecchymosis, no significant hematoma at this point  Bypass is thrombosed, unclear why, back to OR tonight for thrombectomy and revision, tentatively around 5pm    Brian Cameron MD  [facility] (General and Vascular Surgery)  Cell: [phone] (text/call)  Office: [phone]  __________________________________________________________________  Patient:Antonio Cantu   MRN:[id number]   CSN:[id number]    3/14/2021    12:59 PM

## 2021-03-14 NOTE — OR SURGEON
Immediate Post OP Note    PreOp Diagnosis: PAOD with left foot infection and nonhealing wound    PostOp Diagnosis: same    Procedure(s):  CREATION, BYPASS, ARTERIAL, FEMORAL TO POPLITEAL, USING GRAFT - Wound Class: Dirty or Infected  ANGIOGRAM - Wound Class: Dirty or Infected  IRRIGATION AND DEBRIDEMENT, WOUND - Wound Class: Dirty or Infected    Surgeon(s):  Brian Cameron M.D.    Anesthesiologist/Type of Anesthesia:  Anesthesiologist: Kj Stanford D.O./General    Surgical Staff:  Circulator: Stacia Lopez R.N.  Operating Room Assistant (ORA): Taj Guardado Circulator: Marisa Snyder  Scrub Person: Valeri Barton  Radiology Technologist: Robb Escamilla    Specimens removed if any:  * No specimens in log *    Estimated Blood Loss: 125    Findings: Dominant PT flow to the ankle and vessels visibile out to the forefoot    Complications: none        3/13/2021 8:45 PM Brian Cameron M.D.

## 2021-03-15 PROBLEM — R21 RASH OF BACK: Status: ACTIVE | Noted: 2021-03-15

## 2021-03-15 PROBLEM — D64.9 NORMOCYTIC ANEMIA: Status: ACTIVE | Noted: 2021-03-15

## 2021-03-15 LAB
ANION GAP SERPL CALC-SCNC: 7 MMOL/L (ref 7–16)
BASOPHILS # BLD AUTO: 0.1 % (ref 0–1.8)
BASOPHILS # BLD: 0.01 K/UL (ref 0–0.12)
BUN SERPL-MCNC: 7 MG/DL (ref 8–22)
CALCIUM SERPL-MCNC: 9.8 MG/DL (ref 8.5–10.5)
CHLORIDE SERPL-SCNC: 98 MMOL/L (ref 96–112)
CO2 SERPL-SCNC: 27 MMOL/L (ref 20–33)
CREAT SERPL-MCNC: 0.65 MG/DL (ref 0.5–1.4)
EOSINOPHIL # BLD AUTO: 0 K/UL (ref 0–0.51)
EOSINOPHIL NFR BLD: 0 % (ref 0–6.9)
ERYTHROCYTE [DISTWIDTH] IN BLOOD BY AUTOMATED COUNT: 46.4 FL (ref 35.9–50)
GLUCOSE SERPL-MCNC: 131 MG/DL (ref 65–99)
HCT VFR BLD AUTO: 40.2 % (ref 42–52)
HGB BLD-MCNC: 13.1 G/DL (ref 14–18)
IMM GRANULOCYTES # BLD AUTO: 0.08 K/UL (ref 0–0.11)
IMM GRANULOCYTES NFR BLD AUTO: 0.5 % (ref 0–0.9)
LYMPHOCYTES # BLD AUTO: 0.79 K/UL (ref 1–4.8)
LYMPHOCYTES NFR BLD: 5.4 % (ref 22–41)
MAGNESIUM SERPL-MCNC: 1.9 MG/DL (ref 1.5–2.5)
MCH RBC QN AUTO: 31.8 PG (ref 27–33)
MCHC RBC AUTO-ENTMCNC: 32.6 G/DL (ref 33.7–35.3)
MCV RBC AUTO: 97.6 FL (ref 81.4–97.8)
MONOCYTES # BLD AUTO: 1.27 K/UL (ref 0–0.85)
MONOCYTES NFR BLD AUTO: 8.7 % (ref 0–13.4)
NEUTROPHILS # BLD AUTO: 12.4 K/UL (ref 1.82–7.42)
NEUTROPHILS NFR BLD: 85.3 % (ref 44–72)
NRBC # BLD AUTO: 0 K/UL
NRBC BLD-RTO: 0 /100 WBC
PHOSPHATE SERPL-MCNC: 2.5 MG/DL (ref 2.5–4.5)
PLATELET # BLD AUTO: 224 K/UL (ref 164–446)
PMV BLD AUTO: 9.3 FL (ref 9–12.9)
POTASSIUM SERPL-SCNC: 4.4 MMOL/L (ref 3.6–5.5)
RBC # BLD AUTO: 4.12 M/UL (ref 4.7–6.1)
SODIUM SERPL-SCNC: 132 MMOL/L (ref 135–145)
WBC # BLD AUTO: 14.6 K/UL (ref 4.8–10.8)

## 2021-03-15 PROCEDURE — 83735 ASSAY OF MAGNESIUM: CPT

## 2021-03-15 PROCEDURE — A9270 NON-COVERED ITEM OR SERVICE: HCPCS | Performed by: HOSPITALIST

## 2021-03-15 PROCEDURE — 36415 COLL VENOUS BLD VENIPUNCTURE: CPT

## 2021-03-15 PROCEDURE — 700111 HCHG RX REV CODE 636 W/ 250 OVERRIDE (IP): Performed by: SURGERY

## 2021-03-15 PROCEDURE — 85025 COMPLETE CBC W/AUTO DIFF WBC: CPT

## 2021-03-15 PROCEDURE — 99233 SBSQ HOSP IP/OBS HIGH 50: CPT | Performed by: INTERNAL MEDICINE

## 2021-03-15 PROCEDURE — A9270 NON-COVERED ITEM OR SERVICE: HCPCS | Performed by: INTERNAL MEDICINE

## 2021-03-15 PROCEDURE — A9270 NON-COVERED ITEM OR SERVICE: HCPCS | Performed by: SURGERY

## 2021-03-15 PROCEDURE — 51798 US URINE CAPACITY MEASURE: CPT

## 2021-03-15 PROCEDURE — 770006 HCHG ROOM/CARE - MED/SURG/GYN SEMI*

## 2021-03-15 PROCEDURE — 700102 HCHG RX REV CODE 250 W/ 637 OVERRIDE(OP): Performed by: HOSPITALIST

## 2021-03-15 PROCEDURE — 94760 N-INVAS EAR/PLS OXIMETRY 1: CPT

## 2021-03-15 PROCEDURE — 700102 HCHG RX REV CODE 250 W/ 637 OVERRIDE(OP): Performed by: SURGERY

## 2021-03-15 PROCEDURE — 80048 BASIC METABOLIC PNL TOTAL CA: CPT

## 2021-03-15 PROCEDURE — 700111 HCHG RX REV CODE 636 W/ 250 OVERRIDE (IP): Performed by: INTERNAL MEDICINE

## 2021-03-15 PROCEDURE — 97161 PT EVAL LOW COMPLEX 20 MIN: CPT

## 2021-03-15 PROCEDURE — 700102 HCHG RX REV CODE 250 W/ 637 OVERRIDE(OP): Performed by: INTERNAL MEDICINE

## 2021-03-15 PROCEDURE — 84100 ASSAY OF PHOSPHORUS: CPT

## 2021-03-15 RX ORDER — ATORVASTATIN CALCIUM 40 MG/1
40 TABLET, FILM COATED ORAL EVERY EVENING
Status: DISCONTINUED | OUTPATIENT
Start: 2021-03-15 | End: 2021-03-20 | Stop reason: HOSPADM

## 2021-03-15 RX ORDER — LIDOCAINE HYDROCHLORIDE 40 MG/ML
SOLUTION TOPICAL
Status: DISCONTINUED | OUTPATIENT
Start: 2021-03-15 | End: 2021-03-20 | Stop reason: HOSPADM

## 2021-03-15 RX ORDER — TRIAMCINOLONE ACETONIDE 1 MG/G
OINTMENT TOPICAL 2 TIMES DAILY
Status: DISCONTINUED | OUTPATIENT
Start: 2021-03-15 | End: 2021-03-20 | Stop reason: HOSPADM

## 2021-03-15 RX ORDER — PETROLATUM 42 G/100G
1 OINTMENT TOPICAL 3 TIMES DAILY
Status: DISCONTINUED | OUTPATIENT
Start: 2021-03-15 | End: 2021-03-20 | Stop reason: HOSPADM

## 2021-03-15 RX ORDER — LIDOCAINE HYDROCHLORIDE 20 MG/ML
1 JELLY TOPICAL
Status: DISCONTINUED | OUTPATIENT
Start: 2021-03-15 | End: 2021-03-15

## 2021-03-15 RX ADMIN — OXYCODONE HYDROCHLORIDE 10 MG: 10 TABLET ORAL at 09:08

## 2021-03-15 RX ADMIN — Medication 1 APPLICATION: at 18:00

## 2021-03-15 RX ADMIN — TRIAMCINOLONE ACETONIDE: 1 OINTMENT TOPICAL at 21:11

## 2021-03-15 RX ADMIN — KETOROLAC TROMETHAMINE 30 MG: 30 INJECTION, SOLUTION INTRAMUSCULAR at 11:50

## 2021-03-15 RX ADMIN — KETOROLAC TROMETHAMINE 30 MG: 30 INJECTION, SOLUTION INTRAMUSCULAR at 17:57

## 2021-03-15 RX ADMIN — ATORVASTATIN CALCIUM 40 MG: 40 TABLET, FILM COATED ORAL at 17:57

## 2021-03-15 RX ADMIN — ENOXAPARIN SODIUM 40 MG: 40 INJECTION SUBCUTANEOUS at 11:49

## 2021-03-15 RX ADMIN — KETOROLAC TROMETHAMINE 30 MG: 30 INJECTION, SOLUTION INTRAMUSCULAR at 06:13

## 2021-03-15 RX ADMIN — DOCUSATE SODIUM 100 MG: 100 CAPSULE, LIQUID FILLED ORAL at 17:57

## 2021-03-15 RX ADMIN — DOCUSATE SODIUM 50 MG AND SENNOSIDES 8.6 MG 2 TABLET: 8.6; 5 TABLET, FILM COATED ORAL at 17:57

## 2021-03-15 RX ADMIN — ACETAMINOPHEN 1000 MG: 500 TABLET, FILM COATED ORAL at 11:49

## 2021-03-15 RX ADMIN — OXYCODONE HYDROCHLORIDE 10 MG: 10 TABLET ORAL at 13:45

## 2021-03-15 RX ADMIN — ACETAMINOPHEN 1000 MG: 500 TABLET, FILM COATED ORAL at 17:57

## 2021-03-15 RX ADMIN — OXYCODONE HYDROCHLORIDE 10 MG: 10 TABLET ORAL at 21:11

## 2021-03-15 RX ADMIN — ACETAMINOPHEN 1000 MG: 500 TABLET, FILM COATED ORAL at 06:13

## 2021-03-15 RX ADMIN — GABAPENTIN 300 MG: 300 CAPSULE ORAL at 21:11

## 2021-03-15 ASSESSMENT — ENCOUNTER SYMPTOMS
EYE DISCHARGE: 0
VOMITING: 0
EYE REDNESS: 0
SHORTNESS OF BREATH: 0
SENSORY CHANGE: 1
FEVER: 0
CHILLS: 0
BLURRED VISION: 0

## 2021-03-15 ASSESSMENT — COGNITIVE AND FUNCTIONAL STATUS - GENERAL
SUGGESTED CMS G CODE MODIFIER MOBILITY: CH
MOBILITY SCORE: 24

## 2021-03-15 ASSESSMENT — PAIN DESCRIPTION - PAIN TYPE
TYPE: ACUTE PAIN;SURGICAL PAIN
TYPE: ACUTE PAIN

## 2021-03-15 ASSESSMENT — GAIT ASSESSMENTS
GAIT LEVEL OF ASSIST: SUPERVISED
DISTANCE (FEET): 300

## 2021-03-15 NOTE — ANESTHESIA TIME REPORT
Anesthesia Start and Stop Event Times     Date Time Event    3/14/2021 1650 Ready for Procedure     1705 Anesthesia Start     2036 Anesthesia Stop        Responsible Staff  03/14/21    Name Role Begin End    Shell Abrams M.D. Anesth 1705 2036        Preop Diagnosis (Free Text):  Pre-op Diagnosis     Thrombosed bypass        Preop Diagnosis (Codes):    Post op Diagnosis  Thrombosis of femoral-femoral bypass graft (HCC)      Premium Reason  A. 3PM - 7AM    Comments:

## 2021-03-15 NOTE — ANESTHESIA POSTPROCEDURE EVALUATION
Patient: Antonio Cantu    Procedure Summary     Date: 03/14/21 Room / Location: [place] [id number] / SURGERY [facility]    Anesthesia Start: 1705 Anesthesia Stop: 2036    Procedures:       Revision Left Femoral to Popliteal Bypass (Left Leg Lower)      ANGIOGRAM (Left Leg Lower)      THROMBECTOMY (Left Leg Lower) Diagnosis: (Thrombosed bypass)    Surgeons: Brian Cameron M.D. Responsible Provider: Shell Abrams M.D.    Anesthesia Type: general ASA Status: 3 - Emergent          Final Anesthesia Type: general  Last vitals  BP   Blood Pressure: 132/83, Arterial BP: (!) 264/263    Temp   36.2 °C (97.1 °F)    Pulse   76   Resp   17    SpO2   97 %      Anesthesia Post Evaluation    Patient location during evaluation: PACU  Patient participation: complete - patient participated  Level of consciousness: awake and alert    Airway patency: patent  Anesthetic complications: no  Cardiovascular status: hemodynamically stable  Respiratory status: acceptable  Hydration status: euvolemic    PONV: none          No complications documented.     Nurse Pain Score: 0 (NPRS)

## 2021-03-15 NOTE — PROGRESS NOTES
Pt alert and oriented.  Incision on left leg with prevena, CDI.  Pt denies pain, no nausea.  VSS.

## 2021-03-15 NOTE — ANESTHESIA PROCEDURE NOTES
Arterial Line  Performed by: Shell Abrams M.D.  Authorized by: Shell Abrams M.D.     Start Time:  3/14/2021 5:22 PM  End Time:  3/14/2021 5:23 PM  Localization: surface landmarks    Patient Location:  OR  Indication: continuous blood pressure monitoring        Catheter Size:  20 G  Seldinger Technique?: Yes    Laterality:  Left  Site:  Radial artery  Line Secured:  Antimicrobial disc, tape, transparent dressing, benzoin and steristrips  Events: patient tolerated procedure well with no complications

## 2021-03-15 NOTE — OP REPORT
DATE OF SERVICE:  03/13/2021     PREOPERATIVE DIAGNOSES:   Left lower extremity peripheral arterial occlusive   disease with arterial insufficiency and nonhealing wound of the left foot with   superimposed infection.     POSTOPERATIVE DIAGNOSES:  Left lower extremity peripheral arterial occlusive   disease with arterial insufficiency and nonhealing wound of the left foot with   superimposed infection.     PROCEDURES PERFORMED:    1.  Left common femoral to below-the-knee common femoral to tibioperoneal   trunk bypass with in situ greater saphenous vein.  2.  Left lower extremity angiogram.  3.  Plain balloon angioplasty of the greater saphenous vein bypass conduit.  4. Sharp excisional debridement of left foot wound including skin,   subcutaneous tissue, 2 cubic cm of tissue was removed.     SURGEON:   Brian Cameron MD.     ASSISTANT:  None.     ANESTHESIA:  General.     ESTIMATED BLOOD LOSS:  150 mL.     SPECIMENS:  None.     FINDINGS:  Passing the valvulotome initially created a strong palpable pulse   throughout the vein bypass conduit.  This pulse was later lost during the   operation.  Angiogram showed a stenotic segment in the distal thigh, which was   treated with angioplasty and at that point, we appeared to have an excellent   technical result on angiogram.     COMPLICATIONS:  None.     DISPOSITION:  The patient tolerated the procedure well.  He was extubated and   sent to recovery in stable condition.     DESCRIPTION OF PROCEDURE:  Following informed consent, the patient was placed   supine on the operating table and general anesthesia was administered.    Arterial line and Lebron catheter were inserted.  The patient was prepped and   draped sterile.  Surgical timeout was called.  The patient received   preoperative antibiotics.     Local anesthetic was infiltrated over the left groin and then a vertical   incision was made along the course of the common femoral artery.  We dissected   down into the  femoral sheath on the left side and we identified the common   femoral artery, which was dissected out proximally and encircled with a vessel   loop.  We then carried the dissection distally and we encircled the origin of   the SFA and profunda with vessel loops.  The artery was found to be   surprisingly soft and had a strong palpable pulse in it.  We then dissected   out the nearby common femoral vein and we identified the saphenofemoral   junction and we dissected out the saphenous vein for a length of about 6 cm,   ligating the side branches and mobilizing the vein from surrounding tissue.     At this point, the common femoral artery was accessed with a micro access   sheath and the left lower extremity angiogram was performed.  This showed   complete flush occlusion of the superficial femoral artery and a very well   developed widely patent profunda femoris artery with extensive branches going   down the thigh.  These branches reconstituted what appeared to be the   tibioperoneal trunk and the anterior tibial artery.  It appeared that the   popliteal artery was occluded all the way to the very distal aspect of the   below-the-knee popliteal artery.  It appeared that the posterior tibial artery   was the dominant runoff vessel to the foot, although the anterior tibial   artery is of decent caliber as well.  I chose to use the tibioperoneal trunk   as the distal bypass target.  The micro access sheath was removed and the site   was closed with a 5-0 Prolene suture.     At this point, we used an ultrasound to identify the saphenous vein in the   lower leg and we made our below-the knee incision accordingly.  Through the   below-knee longitudinal incision, we dissected out the saphenous vein well   beyond the distal target and we mobilized it and ligated the side branches.    At this point, the saphenous vein was swept out of the way and we opened the   fascia overlying the tibioperoneal trunk and we dissected down  into the deep   compartment.  We were easily able to identify the tibioperoneal trunk and we   dissected out circumferentially for a length of about 3 cm and we encircled it   proximally and distally with vessel loops.     At this point, the patient was systemically heparinized and it was allowed   adequate time to circulate and then we clamped the common femoral artery and   its branches.  A longitudinal arteriotomy was made to accept the angela of the   saphenous vein.  We ligated the saphenofemoral junction with 4-0 Prolene and   we mobilized the saphenous vein angela over onto the common femoral artery and   we created an end-to-side anastomosis with a running 5-0 Prolene suture.    There was a valve leaflet visible right at the end of the saphenous vein angela,   which was sharply removed from the vein and the vein appeared to be of   adequate caliber and quality for creation of the bypass.  Once the anastomosis   was complete, we pressurized it and it was found to be hemostatic.  We then   passed the valvulotome from the distal end of the vein all the way up to the   femoral anastomosis with care taken not to pass the blades of the valvulotome   beyond the anastomosis.  Of note, there was a strong palpable pulse in the   first 4 cm of the vein bypass conduit.  So therefore, the valvulotome was   deployed in the segment with a palpable pulse.  The valvulotome was then   pulled back through the vein and we established a pulsatile inflow.  In order   to verify the success of the valvulotome severing all the valve leaflets, I   passed a #3 Harinder catheter retrograde up the vein, which passed easily and   then pulled the Harinder back while fully inflated and it met no resistance.     At this point, I clamped the tibioperoneal trunk and I made a longitudinal   arteriotomy.  There was decent backbleeding and antegrade bleeding out of the   arteriotomy.  We then fashioned a angela in the distal end of the saphenous vein    and we created an end-to-side anastomosis with the tibioperoneal trunk using   a running 6-0 Prolene suture line.  The anastomosis was flushed and irrigated   prior to completion.  Once complete, the anastomosis was pressurized and found   to be hemostatic.  We noticed at this point that the pulsation in the   saphenous vein had decreased substantially from what we had originally   achieved when passing the valvulotome.  I therefore accessed the saphenous   vein near the femoral anastomosis and we performed an angiogram, which showed   stenosis in the segment of saphenous vein in the distal thigh and also several   side branches at this location.  I performed a direct cutdown and ligated the   side branches and repeated the angiogram and this showed persistent stenosis.    I therefore used a 7 mm plain angioplasty balloon and I performed a 2-minute   angioplasty at a pressure of 4 in the segment of the vein, we could visually   see that the balloon expanded the vein well and we treated this entire segment   and then repeated the angiogram and it showed excellent caliber of the   saphenous vein with no evidence of any residual stenosis.  The distal   anastomosis was widely patent and there was flow down the posterior tibial   artery and retrograde flow up the tibioperoneal trunk and over into the   anterior tibial artery and then down to the ankle.  We also saw flow from the   posterior tibial artery around the ankle and down to the forefoot and these   vessels appeared to be adequately developed to allow for healing of the foot   wound.  There did not appear to be any technical issues with the bypass or the   runoff vessels at all at this point in time, I removed the sheath from the   vein and closed the site with a 5-0 Prolene suture.     Topical hemostatic was applied and the heparin was reversed with protamine.    The incisions were closed with multiple layers of absorbable suture and   staples for the skin.   There were some areas where the vein conduit was close   to the skin surface and these segments of the incisions were closed with   interrupted 2-0 nylon vertical mattress sutures.  The incisions were protected   with gauze and Tegaderm bandages.     At this point, we took to debriding the foot wound.  I injected local   anesthetic around the wound and then I performed a sharp excisional   debridement including skin and subcutaneous tissue and about 2 cubic cm of   tissue was removed.  The underlying tissue was bleeding.  We bandaged the foot   with Betadine and a gauze wrap.  The patient tolerated the procedure well.    All counts were correct.  He was extubated and sent to recovery in stable   condition.     POSTOPERATIVE PLAN:  The patient will be managed with aspirin 81 mg daily and   we will discuss smoking cessation.  His wound will be managed with local care   and we will monitor the clinical progress of healing the foot wound.        ______________________________  MD NEO Camarena/KAYE    DD:  03/14/2021 20:54  DT:  03/14/2021 21:55    Job#:  [id number]

## 2021-03-15 NOTE — CARE PLAN
Problem: Venous Thromboembolism (VTW)/Deep Vein Thrombosis (DVT) Prevention:  Goal: Patient will participate in Venous Thrombosis (VTE)/Deep Vein Thrombosis (DVT)Prevention Measures  Outcome: PROGRESSING AS EXPECTED  Note: Ambulating, Lovenox. Pulses present     Problem: Bowel/Gastric:  Goal: Normal bowel function is maintained or improved  Outcome: PROGRESSING AS EXPECTED  Note: Passing gas, ambulating, taking stool softeners. Tolerating PO intake.

## 2021-03-15 NOTE — PROGRESS NOTES
"Hospital Medicine Daily Progress Note    Date of Service  3/15/2021    Chief Complaint  60 y.o. male admitted 3/12/2021 with leg pain    Hospital Course  60-year-old male with a history of BPH, tobacco use, erectile dysfunction, hypertension, dyslipidemia, polycythemia who presented to [facility] with left leg pain and wound where a CTA was performed which showed occlusion of the femoral artery running distally.  He was transferred to [facility].  He was started on heparin drip.  Vascular surgery was consulted.  Dr. Cameron performed left lower extremity bypass on 3/13/2021, that night noted to have bleeding at left groin site, sand bag applied, vitals stable and H/H stable.  During surgery and postop unable to find pulses on doppler, concern for thrombosis. He was taken back to OR on 3/14/21 for thrombectomy and revision.  He then had prevena placed.  Pulses now able to doppler.      Interval Problem Update  POD#1, s/p thrombectomy and bypass revision, doing well, pain controlled, prevena in place   - still has rash on his back, had it a few years ago and went to derm who gave him cream that \"stained the sheets\" so stopped using, applied aquaphor today  - Na 132  - hills in place, removed  - H/H stable  - on RA  - afebrile  - worked with PT, no dc needs    Consultants/Specialty  Vascular surgery    Code Status  Full Code    Disposition  Likely home when medically clear, needs to work with OT, likely home in next 24-48 hours    Review of Systems  Review of Systems   Constitutional: Negative for chills and fever.   HENT: Negative for nosebleeds.    Eyes: Negative for blurred vision, discharge and redness.   Respiratory: Negative for shortness of breath.    Cardiovascular: Positive for leg swelling. Negative for chest pain.   Gastrointestinal: Negative for vomiting.   Genitourinary: Negative for hematuria.   Musculoskeletal:        Left leg and foot pain   Skin: Positive for itching and rash " (back).        Wound on left foot, plantar surface   Neurological: Positive for sensory change.        Physical Exam  Temp:  [36.1 °C (97 °F)-36.9 °C (98.5 °F)] 36.9 °C (98.5 °F)  Pulse:  [67-99] 67  Resp:  [16-18] 16  BP: (101-134)/(69-93) 124/85  SpO2:  [93 %-99 %] 95 %    Physical Exam  Vitals and nursing note reviewed.   Constitutional:       General: He is not in acute distress.     Appearance: He is not toxic-appearing or diaphoretic.   HENT:      Head: Normocephalic and atraumatic.      Nose: Nose normal.      Mouth/Throat:      Mouth: Mucous membranes are moist.   Eyes:      General: No scleral icterus.     Conjunctiva/sclera: Conjunctivae normal.   Cardiovascular:      Rate and Rhythm: Normal rate and regular rhythm.      Heart sounds: No murmur. No friction rub. No gallop.    Pulmonary:      Effort: Pulmonary effort is normal.      Breath sounds: Normal breath sounds.   Abdominal:      General: Abdomen is flat. Bowel sounds are normal. There is no distension.      Palpations: Abdomen is soft.      Tenderness: There is no abdominal tenderness.   Musculoskeletal:         General: Swelling present.      Cervical back: Normal range of motion.      Left lower leg: Edema present.      Comments: prevena on left leg   Skin:     Capillary Refill: Capillary refill takes more than 3 seconds.      Findings: Erythema present.      Comments: 2 wounds to left plantar foot, foot wrapped  Rash on the back: small diffuse ulcerated weaping rash    Neurological:      Mental Status: He is alert and oriented to person, place, and time.   Psychiatric:         Mood and Affect: Mood normal.         Behavior: Behavior normal.               Fluids    Intake/Output Summary (Last 24 hours) at 3/15/2021 1659  Last data filed at 3/15/2021 1550  Gross per 24 hour   Intake 2940 ml   Output 3780 ml   Net -840 ml       Laboratory  Recent Labs     03/14/21  0329 03/14/21  0845 03/15/21  0532   WBC 13.1* 12.8* 14.6*   RBC 4.81 4.66* 4.12*      HEMOGLOBIN 15.3 14.8 13.1*   HEMATOCRIT 46.9 44.8 40.2*   MCV 97.5 96.1 97.6   MCH 31.8 31.8 31.8   MCHC 32.6* 33.0* 32.6*   RDW 47.0 45.9 46.4   PLATELETCT 238 248 224   MPV 9.1 9.2 9.3     Recent Labs     03/13/21  0419 03/14/21  0329 03/15/21  0532   SODIUM 135 129* 132*   POTASSIUM 4.1 4.3 4.4   CHLORIDE 98 95* 98   CO2 25 27 27   GLUCOSE 100* 138* 131*   BUN 9 7* 7*   CREATININE 0.57 0.57 0.65   CALCIUM 10.1 9.8 9.8             Recent Labs     03/13/21  0419   TRIGLYCERIDE 86   HDL 48   LDL 88       Imaging  DX-PORTABLE FLUORO > 1 HOUR   Final Result      Digitized intraoperative radiograph is submitted for review.  This examination is not for diagnostic purpose but for guidance during a surgical procedure.         CT-FOREIGN FILM CAT SCAN   Final Result      DX-PORTABLE FLUORO > 1 HOUR    (Results Pending)        Assessment/Plan  * Femoral artery occlusion (HCC)  Assessment & Plan  S/p heparin gtt, stopped after bleeding/hematoma POD#1  Vascular surgery following: s/p LLE bypass c/b thrombosis, s/p thrombectomy and revision 3/14  Risk factors include tobacco use, hypertension, and dyslipidemia as well as age and male sex.  S/p conversation about smoking cessation he does appear motivated.    Rash of back- (present on admission)  Assessment & Plan  Patient reports history of similar pruritic rash a few years ago on his flanks (now with hypopigmented scars)  Will trial aquaphor first    Normocytic anemia  Assessment & Plan  Likely in setting of surgeries  CTM    Hyponatremia  Assessment & Plan  Likely in setting of post op SIADH + heparin in 1/2 NS  On LR, improving  CTM    Acute respiratory insufficiency  Assessment & Plan  Resolved  After surgery  On NC  IS  Wean as tolerated    Mixed hyperlipidemia- (present on admission)  Assessment & Plan  Will start statin since not currently on one and has severe PAD    Polycythemia- (present on admission)  Assessment & Plan  Patient has a history of polycythemia,  however he has never been phlebotomized.  Hemoglobin here is 17 on admission  Now anemic after surgeries    Current every day smoker- (present on admission)  Assessment & Plan  I counseled the patient x10 minutes.  He has been smoking for many years but has been trying to slowly quit.  He is currently down to about 10 cigarettes daily.  He has not had any luck with Chantix, he has tried patches but has not tried gum.  As needed replacement  Continue to support and encourage him.    Essential hypertension- (present on admission)  Assessment & Plan  Continue lisinopril  Monitor and titrate       VTE prophylaxis: heparin gtt

## 2021-03-15 NOTE — PROGRESS NOTES
Bedside report received. Assessment completed.  Pt is A&O x4. Pt on room air.   Medicating for pain PRN per MAR Pain 7/10  Denies nausea.  BLE and R fingertips numbness and tingling.  Skin LLE is dusky and warm, foot wound is CDI with gauze and tegaderm,. Shin and inner thigh incision have a prevena wound vac. femoral incision with Gauze and tegaderm. Dressings CDI              LDA LAC   Last BM 3/13/21 . +flatus,   +void.  Regular diet. Tolerates well.   Pt up SBA.  Call light and belongings within reach. All needs met at this time. Fall Precautions and hourly rounding in place.

## 2021-03-15 NOTE — ANESTHESIA PROCEDURE NOTES
Airway    Date/Time: 3/14/2021 5:13 PM  Performed by: Shell Abrams M.D.  Authorized by: Shell Abrams M.D.     Location:  OR  Urgency:  Elective  Indications for Airway Management:  Anesthesia      Spontaneous Ventilation: absent    Sedation Level:  Deep  Preoxygenated: Yes    Patient Position:  Sniffing  Mask Difficulty Assessment:  0 - not attempted  Final Airway Type:  Endotracheal airway  Final Endotracheal Airway:  ETT  Cuffed: Yes    Technique Used for Successful ETT Placement:  Direct laryngoscopy  Devices/Methods Used in Placement:  Cricoid pressure    Insertion Site:  Oral  Blade Type:  Demetri  Laryngoscope Blade/Videolaryngoscope Blade Size:  4  ETT Size (mm):  7.5  Measured from:  Teeth  ETT to Teeth (cm):  26  Placement Verified by: auscultation and capnometry    Cormack-Lehane Classification:  Grade IIa - partial view of glottis  Number of Attempts at Approach:  1

## 2021-03-15 NOTE — PROGRESS NOTES
Report received from PACU RN, patient returned to unit in stable condition. No visible signs of distress. VSS. Patient reported elevated pain, medicated per MAR, pain now under control. Patient on room air without SOB, able to achieve 4000 on IS. Prevena to LLE is patent and no drainage in canister. Bed locked and in low position.

## 2021-03-15 NOTE — THERAPY
"Physical Therapy   Initial Evaluation     Patient Name: Antonio Cantu  Age:  60 y.o., Sex:  male  Medical Record #: [id number]  Today's Date: 3/15/2021          Assessment  Patient is 60 y.o. male s/p left uhmatqz-ca-ogrnfuxyncitu trunk bypass and thrombectomy (3/14) presenting to PT without gross mobility impairment. Pt completed bed mob, transfers, and gait x300' without device or overt LOB. He has been educated on heel WB to protect L plantar foot wound and reports he has been doing this for \"a while\". Pt also able to manage x2 stairs at [facility]. At this time, pt does not require further acute PT intervention and appears functionally capable of return home.        Plan    Recommend Physical Therapy for Evaluation only     DC Equipment Recommendations: None  Discharge Recommendations: Anticipate that the patient will have no further physical therapy needs after discharge from the hospital        Objective       03/15/21 1549   Prior Living Situation   Prior Services None   Housing / Facility 1 Story House   Steps Into Home 2   Equipment Owned None   Lives with - Patient's Self Care Capacity Alone and Able to Care For Self   Comments pt reports he has a friend who may be able to assist with groceries etc.    Prior Level of Functional Mobility   Bed Mobility Independent   Transfer Status Independent   Ambulation Independent   Distance Ambulation (Feet)   (community)   Assistive Devices Used None   Stairs Independent   Gait Analysis   Gait Level Of Assist Supervised   Assistive Device None   Distance (Feet) 300   # of Stairs Climbed 3   Level of Assist with Stairs Supervised   Weight Bearing Status no restrictions noted in chart; educated pt on heel WB to protect plantar wound   Bed Mobility    Supine to Sit Independent   Sit to Supine Independent   Scooting Independent   Rolling Independent   Functional Mobility   Sit to Stand Supervised   Bed, Chair, Wheelchair Transfer Supervised   Transfer Method Stand Step   Anticipated " Discharge Equipment and Recommendations   DC Equipment Recommendations None   Discharge Recommendations Anticipate that the patient will have no further physical therapy needs after discharge from the hospital

## 2021-03-15 NOTE — OP REPORT
DATE OF SERVICE:  03/14/2021     PREOPERATIVE DIAGNOSES:  1.  Thrombosed left lower extremity rgwfcxz-ta-ygsyvcfcmpnub trunk bypass   graft.  2.  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     POSTOPERATIVE DIAGNOSES:  1.  Thrombosed left lower extremity ggmhdnu-nz-gtflhizrbwxgp trunk bypass   graft.  2.  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     PROCEDURES:  1.  Left lower extremity Harinder catheter thrombectomy of ocmfbif-iz-sszbnxzfn   bypass graft.  2.  Revision of gqxdheh-ye-whjpbhdwypvrt trunk bypass graft.  3.  Revision of left aannmdn-nv-ymieaecyrqlxq trunk bypass graft by extension   of the bypass down to the posterior tibial artery using ipsilateral reverse   greater saphenous vein.  4.  Left lower extremity angiography.     SURGEON:  Brian Cameron MD     ASSISTANT:  None.     ANESTHESIA:  General.     ESTIMATED BLOOD LOSS:  300 mL.     SPECIMENS:  None sent.     FINDINGS:  After thrombectomy, the graft had a strong pulse throughout.  There   was no specific divide in the vein conduit or in the anastomosis or the   runoff vessels.  Decision was made to add a new piece of vein above the   stenosed area from the night prior when the bypass was inserted and then bring   that new vein down to a distal target beyond the existing anastomosis based   on the angiogram from the previous operation.     COMPLICATIONS:  None.     DISPOSITION:  The patient tolerated the procedure well.  He was sent to   recovery in stable condition.     INDICATIONS:  The patient is a 60-year-old male who presented to the hospital   with a left foot wound and increasing pain, likely due to superimposed   infection.  The patient was found to have severe arterial insufficiency from a   femoral and popliteal occlusion.  He  underwent a crpbvsx-hh-SR trunk in situ   vein bypass the night before, and overnight the bypass thrombosed. It is not   exactly clear why the bypass thrombosed; however, at the first operation, we   did have a stenosed segment of the vein conduit, which was treated with   angioplasty and it is possible that this segment may have restenosed after the   angioplasty.  Therefore, I recommended we go back to the operating room to   revise the distal segment of the vein bypass conduit.  I explained the steps   of the operation and the expected recovery and also the potential risks.    Patient understood and agreed to proceed and informed consent was obtained.     DESCRIPTION OF PROCEDURE:  The patient was placed supine on the operating   table and general anesthesia was administered.  An arterial line and Lebron   catheter were inserted.  The patient was prepped and draped sterile.  Surgical   timeout was called.  The patient received preoperative antibiotics.     The procedure began by reopening the distal thigh incision and the below-knee   incision as well.  We identified the vein conduit in the below-knee incision   and we encircled it with vessel loops.  The patient was systemically   heparinized.  We made a transverse opening in the vein and we passed a #4   Harinder retrograde up the vein conduit and it passed easily through the   anastomosis up into the common femoral artery.  We then swept the vein clear   with the #4 Fogerty and we easily restored pulsatile inflow.  We then swept   the vein bypass distally down into the posterior tibial artery and we pulled   out a small amount of thrombus and we restored significant backbleeding.  At   this point there was nothing apparent to explain why the vein bypass may have   thrombosed.  We did check an ACT to make sure the patient was responding   appropriately to the heparin.  The ACT responded appropriately.  The clot that   we removed from the proximal and distal end  of the bypass conduit was fresh   red clot and there was no evidence of white clot.     At this point, I thought it was likely that the vein conduit and the distal   thigh had a restenosis as this was the area that required angioplasty at the   first operation.  Therefore, in order to address this, I decided to extend   from above the stenotic segments all the way down to a new distal target using   a fresh piece of saphenous vein from the lower leg.  Fortunately, the patient   had a large saphenous vein all the way down past the ankle.  We extended the   vein harvest incision all the way down to the ankle and we mobilized and   removed the saphenous vein.  We flushed it reverse and inflated nicely and   side branches were ligated as needed.     We then clamped the existing bypass conduit in the distal thigh and we made a   longitudinal arteriotomy and we created an end-to-side anastomosis with the   new piece of saphenous vein using a running 5-0 Prolene suture line.  The   anastomosis was then pressurized and found to be hemostatic and then the new   saphenous vein conduit was pressurized throughout and it was found to be   pulsatile throughout with good caliber and no evidence of bleeding.  We then   checked the flow at the end of the new saphenous vein conduit and it was brisk   pulsatile flow.     At this point we tunneled the new piece of saphenous vein conduit past the   knee and down into the lower incision.  We then extended the dissection from   the lower incision about 10 cm past the anastomosis from the first operation.    The reason I chose this length to extend the dissection is because on the   angiogram from the first operation, it showed that the posterior tibial artery   had a better caliber if I extended 10 cm distal to the first anastomosis.    Sure enough, the posterior tibial artery at this location appeared to have an   excellent caliber and was easily suitable for bypass anastomosis.  The    posterior tibial artery was dissected out for a length of about 3 cm and   encircled with vessel loops.  A longitudinal arteriotomy was made and we   verified backbleeding from the posterior tibial artery arteriotomy.  We also   passed a 2 mm and 2.5 mm and 3 mm vessel dilator down the posterior tibial   artery and there was no resistance at all.  We then fashioned a angela in the   distal end of the saphenous vein conduit and we anastomosed it in an   end-to-side fashion to the posterior tibial artery using a running 6-0 Prolene   suture line.  Both vessels were of good caliber and quality and the   anastomosis was straightforward.  The anastomosis was flushed and irrigated   prior to completion, then once complete, it was pressurized and found to be   hemostatic.  We then removed the clamps and we allowed perfusion through the   new bypass conduit down into the posterior tibial artery and there was a pulse   distal to the anastomosis and a brisk triphasic Doppler signal at the ankle.     In order to verify once again that there were no anatomic concerns with the   bypass, I did perform an angiography.  The vein conduit was accessed directly   with an 18-gauge Angiocath and then angiography was performed from the distal   thigh anastomosis all the way down through the foot.  This showed excellent   flow through the bypass conduit and down the posterior tibial artery and also   retrograde flow up through the tibioperoneal trunk into the anterior tibial   artery and down to the ankle.  The angiogram did show blood vessels going out   onto the foot all the way to the forefoot, and it appears, based on these   images that the foot is adequately perfused to allow for wound healing and   recovery.  There was nothing on this angiogram to suggest that the bypass will   fail again.     The angiocatheter was removed and the site was closed with 5-0 Prolene suture.    Topical hemostatic was applied to the incisions and then  the incisions were   closed.  The thigh incision was closed with interrupted 2-0 nylon vertical   mattress sutures.  The lower leg incision was closed with a running 2-0 Vicryl   suture followed by staples for the skin.  The pulse was verified again at the   ankle and was found to be triphasic and the site was marked.  We then   protected the incision with a Prevena bandage and it was connected to suction   and found to have a good seal.  All counts were correct.  The patient   tolerated the procedure well.  He was extubated and sent to recovery in stable   condition.     POSTOPERATIVE PLAN:  We will monitor closely for bypass re-thrombosis.  If it   thromboses again, then there should be a high concern for hypercoagulable   disorder.  If the bypass is open, then the patient has a good chance of wound   healing and limb salvage.  The wound on the foot will be treated with local   care.  Smoking cessation will be encouraged.  The patient will be on regular   surveillance through my outpatient clinic.        ______________________________  MD NEO Camarena/RISSA/CRISTO    DD:  03/14/2021 21:18  DT:  03/14/2021 21:49    Job#:  [id number]

## 2021-03-16 ENCOUNTER — APPOINTMENT (OUTPATIENT)
Dept: RADIOLOGY | Facility: MEDICAL CENTER | Age: 61
DRG: 253 | End: 2021-03-16
Attending: INTERNAL MEDICINE
Payer: OTHER MISCELLANEOUS

## 2021-03-16 PROBLEM — D72.829 LEUKOCYTOSIS: Status: ACTIVE | Noted: 2021-03-16

## 2021-03-16 LAB
ANION GAP SERPL CALC-SCNC: 9 MMOL/L (ref 7–16)
BASOPHILS # BLD AUTO: 0.2 % (ref 0–1.8)
BASOPHILS # BLD: 0.03 K/UL (ref 0–0.12)
BUN SERPL-MCNC: 8 MG/DL (ref 8–22)
CALCIUM SERPL-MCNC: 9.8 MG/DL (ref 8.5–10.5)
CHLORIDE SERPL-SCNC: 98 MMOL/L (ref 96–112)
CO2 SERPL-SCNC: 27 MMOL/L (ref 20–33)
CREAT SERPL-MCNC: 0.76 MG/DL (ref 0.5–1.4)
CRP SERPL HS-MCNC: 10.91 MG/DL (ref 0–0.75)
EOSINOPHIL # BLD AUTO: 0.2 K/UL (ref 0–0.51)
EOSINOPHIL NFR BLD: 1.6 % (ref 0–6.9)
ERYTHROCYTE [DISTWIDTH] IN BLOOD BY AUTOMATED COUNT: 45.8 FL (ref 35.9–50)
GLUCOSE SERPL-MCNC: 119 MG/DL (ref 65–99)
HCT VFR BLD AUTO: 40.1 % (ref 42–52)
HGB BLD-MCNC: 13.1 G/DL (ref 14–18)
IMM GRANULOCYTES # BLD AUTO: 0.07 K/UL (ref 0–0.11)
IMM GRANULOCYTES NFR BLD AUTO: 0.5 % (ref 0–0.9)
LYMPHOCYTES # BLD AUTO: 1.24 K/UL (ref 1–4.8)
LYMPHOCYTES NFR BLD: 9.7 % (ref 22–41)
MCH RBC QN AUTO: 31.4 PG (ref 27–33)
MCHC RBC AUTO-ENTMCNC: 32.7 G/DL (ref 33.7–35.3)
MCV RBC AUTO: 96.2 FL (ref 81.4–97.8)
MONOCYTES # BLD AUTO: 1.31 K/UL (ref 0–0.85)
MONOCYTES NFR BLD AUTO: 10.2 % (ref 0–13.4)
NEUTROPHILS # BLD AUTO: 9.99 K/UL (ref 1.82–7.42)
NEUTROPHILS NFR BLD: 77.8 % (ref 44–72)
NRBC # BLD AUTO: 0 K/UL
NRBC BLD-RTO: 0 /100 WBC
PLATELET # BLD AUTO: 276 K/UL (ref 164–446)
PMV BLD AUTO: 10 FL (ref 9–12.9)
POTASSIUM SERPL-SCNC: 4.2 MMOL/L (ref 3.6–5.5)
RBC # BLD AUTO: 4.17 M/UL (ref 4.7–6.1)
SODIUM SERPL-SCNC: 134 MMOL/L (ref 135–145)
WBC # BLD AUTO: 12.8 K/UL (ref 4.8–10.8)

## 2021-03-16 PROCEDURE — 700111 HCHG RX REV CODE 636 W/ 250 OVERRIDE (IP): Performed by: INTERNAL MEDICINE

## 2021-03-16 PROCEDURE — 80048 BASIC METABOLIC PNL TOTAL CA: CPT

## 2021-03-16 PROCEDURE — 84145 PROCALCITONIN (PCT): CPT

## 2021-03-16 PROCEDURE — 73620 X-RAY EXAM OF FOOT: CPT | Mod: LT

## 2021-03-16 PROCEDURE — 86140 C-REACTIVE PROTEIN: CPT

## 2021-03-16 PROCEDURE — 97165 OT EVAL LOW COMPLEX 30 MIN: CPT

## 2021-03-16 PROCEDURE — 36415 COLL VENOUS BLD VENIPUNCTURE: CPT

## 2021-03-16 PROCEDURE — 302098 PASTE RING (FLAT): Performed by: INTERNAL MEDICINE

## 2021-03-16 PROCEDURE — 99233 SBSQ HOSP IP/OBS HIGH 50: CPT | Performed by: INTERNAL MEDICINE

## 2021-03-16 PROCEDURE — 700102 HCHG RX REV CODE 250 W/ 637 OVERRIDE(OP): Performed by: INTERNAL MEDICINE

## 2021-03-16 PROCEDURE — 700105 HCHG RX REV CODE 258: Performed by: INTERNAL MEDICINE

## 2021-03-16 PROCEDURE — 97605 NEG PRS WND THER DME<=50SQCM: CPT

## 2021-03-16 PROCEDURE — 700111 HCHG RX REV CODE 636 W/ 250 OVERRIDE (IP): Performed by: SURGERY

## 2021-03-16 PROCEDURE — 700102 HCHG RX REV CODE 250 W/ 637 OVERRIDE(OP): Performed by: HOSPITALIST

## 2021-03-16 PROCEDURE — 700101 HCHG RX REV CODE 250: Performed by: SURGERY

## 2021-03-16 PROCEDURE — A9270 NON-COVERED ITEM OR SERVICE: HCPCS | Performed by: SURGERY

## 2021-03-16 PROCEDURE — A9270 NON-COVERED ITEM OR SERVICE: HCPCS | Performed by: INTERNAL MEDICINE

## 2021-03-16 PROCEDURE — 87040 BLOOD CULTURE FOR BACTERIA: CPT | Mod: 91

## 2021-03-16 PROCEDURE — A9270 NON-COVERED ITEM OR SERVICE: HCPCS | Performed by: HOSPITALIST

## 2021-03-16 PROCEDURE — 700102 HCHG RX REV CODE 250 W/ 637 OVERRIDE(OP): Performed by: SURGERY

## 2021-03-16 PROCEDURE — 85025 COMPLETE CBC W/AUTO DIFF WBC: CPT

## 2021-03-16 PROCEDURE — 770006 HCHG ROOM/CARE - MED/SURG/GYN SEMI*

## 2021-03-16 RX ORDER — KETOROLAC TROMETHAMINE 30 MG/ML
30 INJECTION, SOLUTION INTRAMUSCULAR; INTRAVENOUS EVERY 6 HOURS PRN
Status: DISCONTINUED | OUTPATIENT
Start: 2021-03-16 | End: 2021-03-16

## 2021-03-16 RX ADMIN — ENOXAPARIN SODIUM 40 MG: 40 INJECTION SUBCUTANEOUS at 06:36

## 2021-03-16 RX ADMIN — ACETAMINOPHEN 1000 MG: 500 TABLET, FILM COATED ORAL at 17:22

## 2021-03-16 RX ADMIN — SODIUM CHLORIDE 3 G: 900 INJECTION INTRAVENOUS at 15:41

## 2021-03-16 RX ADMIN — ACETAMINOPHEN 1000 MG: 500 TABLET, FILM COATED ORAL at 11:47

## 2021-03-16 RX ADMIN — SODIUM CHLORIDE 3 G: 900 INJECTION INTRAVENOUS at 22:24

## 2021-03-16 RX ADMIN — TRIAMCINOLONE ACETONIDE: 1 OINTMENT TOPICAL at 17:24

## 2021-03-16 RX ADMIN — OXYCODONE HYDROCHLORIDE 10 MG: 10 TABLET ORAL at 17:22

## 2021-03-16 RX ADMIN — GABAPENTIN 300 MG: 300 CAPSULE ORAL at 22:24

## 2021-03-16 RX ADMIN — LIDOCAINE HYDROCHLORIDE 20 ML: 10 INJECTION, SOLUTION INFILTRATION; PERINEURAL at 11:55

## 2021-03-16 RX ADMIN — KETOROLAC TROMETHAMINE 30 MG: 30 INJECTION, SOLUTION INTRAMUSCULAR at 06:35

## 2021-03-16 RX ADMIN — OXYCODONE HYDROCHLORIDE 10 MG: 10 TABLET ORAL at 06:36

## 2021-03-16 RX ADMIN — TRIAMCINOLONE ACETONIDE: 1 OINTMENT TOPICAL at 06:35

## 2021-03-16 RX ADMIN — FENTANYL CITRATE 50 MCG: 50 INJECTION, SOLUTION INTRAMUSCULAR; INTRAVENOUS at 11:47

## 2021-03-16 RX ADMIN — KETOROLAC TROMETHAMINE 30 MG: 30 INJECTION, SOLUTION INTRAMUSCULAR at 11:47

## 2021-03-16 RX ADMIN — ATORVASTATIN CALCIUM 40 MG: 40 TABLET, FILM COATED ORAL at 17:22

## 2021-03-16 RX ADMIN — ACETAMINOPHEN 1000 MG: 500 TABLET, FILM COATED ORAL at 06:35

## 2021-03-16 RX ADMIN — OXYCODONE HYDROCHLORIDE 10 MG: 10 TABLET ORAL at 22:24

## 2021-03-16 ASSESSMENT — PAIN DESCRIPTION - PAIN TYPE
TYPE: ACUTE PAIN
TYPE: ACUTE PAIN
TYPE: SURGICAL PAIN
TYPE: ACUTE PAIN

## 2021-03-16 ASSESSMENT — COGNITIVE AND FUNCTIONAL STATUS - GENERAL
HELP NEEDED FOR BATHING: A LITTLE
TOILETING: A LITTLE
PERSONAL GROOMING: A LITTLE
DAILY ACTIVITIY SCORE: 21
SUGGESTED CMS G CODE MODIFIER DAILY ACTIVITY: CJ

## 2021-03-16 ASSESSMENT — PATIENT HEALTH QUESTIONNAIRE - PHQ9
SUM OF ALL RESPONSES TO PHQ9 QUESTIONS 1 AND 2: 0
1. LITTLE INTEREST OR PLEASURE IN DOING THINGS: NOT AT ALL
2. FEELING DOWN, DEPRESSED, IRRITABLE, OR HOPELESS: NOT AT ALL

## 2021-03-16 ASSESSMENT — ACTIVITIES OF DAILY LIVING (ADL): TOILETING: INDEPENDENT

## 2021-03-16 NOTE — ASSESSMENT & PLAN NOTE
Since admission worsening 14  Improving after starting antibiotics  Patient has a wound on the left foot  Start with Unasyn on 3/16 and added vancomycin on 3/17 for possible osteomyelitis however MRI did not show any signs of osteomyelitis, discontinue vancomycin and continue Unasyn for 7 days.  Blood culture on 3/16 is negative till now  Labs daily

## 2021-03-16 NOTE — PROGRESS NOTES
"Vascular    Events  3/15: RHONDA, pain controlled, was up ambulating today, bypass patent    Vitals  BP (!) 98/62 Comment: RN Notified  Pulse 72   Temp 36.7 °C (98.1 °F) (Temporal)   Resp 17   Ht 1.956 m (6' 5\")   Wt 94.6 kg (208 lb 8.9 oz)   SpO2 94%   BMI 24.73 kg/m²     Exam  Bypass patent  Bandages CDI    Labs  WBC 14  Hgb 13  Creatinine normal    A/P)  3/13: LLE Fem-TPT bypass with in situ vein  3/14: LLE fem-TPT thrombectomy and revision    Feeling well  Local care for the foot  Continue IV ABx    Anticipating discharge home on PO antibiotics in 2-3 days depending on clinical progress  Will need home health for wound care       Brian Cameron MD  [facility] (General and Vascular Surgery)  Cell: [phone] (text or call is fine, if you don't reach me please try my office)  Office: [phone]  __________________________________________________________________  Patient:Antonio Cantu   MRN:[id number]   CSN:[id number]        "

## 2021-03-16 NOTE — PROGRESS NOTES
[facility] Medicine Daily Progress Note    Date of Service  3/16/2021    Chief Complaint  60 y.o. male admitted 3/12/2021 with left leg pain    Hospital Course  60-year-old male with a history of BPH, tobacco use, erectile dysfunction, hypertension, dyslipidemia, polycythemia who presented to [facility] on 3/12 with left leg pain and wound where a CTA was performed which showed occlusion of the femoral artery running distally.  He was transferred to [facility].  He was started on heparin drip.  Vascular surgery was consulted.  Dr. Caemron performed left lower extremity bypass on 3/13/2021, however after the procedure at night noted to have bleeding at left groin site, sand bag applied, vitals stable and H/H stable.  During surgery and postop unable to find pulses on doppler, concern for thrombosis. He was taken back to OR on 3/14/21 for thrombectomy and revision.  He then had prevena placed.  Pulses now able to doppler, wound care was consulted and wound VAC was placed.     Patient was evaluated by PT and OT and no need for therapy however home health was ordered for wound care since the patient lives by himself.    Patient has history of heavy smoking for years, I had a long discussion with the patient about importance of quitting smoking, and discussed the risk including not limited to worsening peripheral vascular disease, COPD and lung cancer, he understood however he is not willing to quit.        Interval Problem Update  -Evaluated examined the patient at bedside, patient feels okay  -Wound VAC on the left leg with some swelling.  -Home health for wound care was ordered.  -Waiting for surgical clearance before discharge.  -Wound care was consulted.  -Still having some leukocytosis with elevated CRP, start with Unasyn.   -Waiting for further recommendation from surgery for anticoagulation.      Consultants/Specialty  Vascular surgeon    Code Status  Full Code    Disposition  Home after  vascular surgery clearance.    Review of Systems  Review of Systems   All other systems reviewed and are negative.       Physical Exam  Temp:  [36.1 °C (97 °F)-37.2 °C (98.9 °F)] 36.9 °C (98.4 °F)  Pulse:  [62-92] 65  Resp:  [16-18] 18  BP: ()/(62-85) 113/79  SpO2:  [93 %-99 %] 96 %    Physical Exam  Constitutional:       General: He is not in acute distress.     Appearance: He is not ill-appearing or diaphoretic.   Cardiovascular:      Rate and Rhythm: Normal rate.      Heart sounds: No murmur.   Pulmonary:      Effort: No respiratory distress.      Breath sounds: Wheezing present. No rales.   Abdominal:      General: Bowel sounds are normal. There is no distension.      Palpations: Abdomen is soft.      Tenderness: There is no abdominal tenderness. There is no guarding.   Musculoskeletal:         General: Swelling, tenderness and signs of injury present.      Cervical back: No rigidity.      Right lower leg: No edema.      Left lower leg: Edema present.   Skin:     General: Skin is warm.      Findings: Erythema and lesion present.      Comments: Wound VAC on the left leg  Swelling on the left leg   Neurological:      General: No focal deficit present.      Mental Status: He is alert and oriented to person, place, and time.      Cranial Nerves: No cranial nerve deficit.      Motor: No weakness.                 Fluids    Intake/Output Summary (Last 24 hours) at 3/16/2021 1349  Last data filed at 3/16/2021 1130  Gross per 24 hour   Intake 850 ml   Output 3525 ml   Net -2675 ml       Laboratory  Recent Labs     03/14/21  0329 03/14/21  0845 03/15/21  0532   WBC 13.1* 12.8* 14.6*   RBC 4.81 4.66* 4.12*   HEMOGLOBIN 15.3 14.8 13.1*   HEMATOCRIT 46.9 44.8 40.2*   MCV 97.5 96.1 97.6   MCH 31.8 31.8 31.8   MCHC 32.6* 33.0* 32.6*   RDW 47.0 45.9 46.4   PLATELETCT 238 248 224   MPV 9.1 9.2 9.3     Recent Labs     03/14/21  0329 03/15/21  0532 03/16/21  0222   SODIUM 129* 132* 134*   POTASSIUM 4.3 4.4 4.2   CHLORIDE 95*  98 98   CO2 27 27 27   GLUCOSE 138* 131* 119*   BUN 7* 7* 8   CREATININE 0.57 0.65 0.76   CALCIUM 9.8 9.8 9.8                   Imaging  DX-PORTABLE FLUORO > 1 HOUR   Final Result      Portable fluoroscopy utilized for 44 seconds.         INTERPRETING LOCATION: [address] [place] [address], [place], [id number]      DX-PORTABLE FLUORO > 1 HOUR   Final Result      Digitized intraoperative radiograph is submitted for review.  This examination is not for diagnostic purpose but for guidance during a surgical procedure.         CT-FOREIGN FILM CAT SCAN   Final Result      DX-FOOT-2- LEFT    (Results Pending)        Assessment/Plan  * Femoral artery occlusion (HCC)  Assessment & Plan  Came with left leg pain and discoloration  CTA showed occlusion on the artery  Bypass was done by vascular surgeon on 3/13  Thrombectomy and revision on 3/14  Continue atorvastatin  S/p conversation about smoking cessation he does appear motivated.  Unasyn for his wound started on 3/16    Leukocytosis  Assessment & Plan  Since admission worsening 14  Patient has a wound on the left foot  Start with Unasyn on 3/16  Blood culture on 3/16 is pending  Labs daily    Hyponatremia  Assessment & Plan  Fluid restriction  Labs daily    Essential hypertension- (present on admission)  Assessment & Plan  Continue lisinopril  Monitor and titrate    Rash of back- (present on admission)  Assessment & Plan  Patient reports history of similar pruritic rash a few years ago on his flanks (now with hypopigmented scars)  Will trial aquaphor and triamcinolone ointment  Sarna prn but I don't think we carry that    Normocytic anemia  Assessment & Plan  Likely in setting of surgeries  CTM    Mixed hyperlipidemia- (present on admission)  Assessment & Plan  Will start statin since not currently on one and has severe PAD    Acute respiratory insufficiency  Assessment & Plan  Resolved  After surgery  On NC  IS  Wean as tolerated    Polycythemia- (present on admission)  Assessment &  Plan  Patient has a history of polycythemia, likely related to heavy smoking   however he has never been phlebotomized.  Hemoglobin here is 17 on admission  Now anemic after surgeries    Current every day smoker- (present on admission)  Assessment & Plan  I counseled the patient x10 minutes.  He has been smoking for many years but has been trying to slowly quit.  He is currently down to about 10 cigarettes daily.  He has not had any luck with Chantix, he has tried patches but has not tried gum.  As needed replacement  Continue to support and encourage him.       VTE prophylaxis: Lovenox

## 2021-03-16 NOTE — THERAPY
Occupational Therapy   Initial Evaluation     Patient Name: Antonio Cantu  Age:  60 y.o., Sex:  male  Medical Record #: [id number]  Today's Date: 3/16/2021     Precautions  Precautions: Fall Risk  Comments: likely reapplying wound vac to L foot- wound to plantar aspect of L foot. Advised pt on heel WB which he was able to perform; No formal order. Wound vac on LLE    Assessment  Patient is 60 y.o. male s/p LLE fem pop bypass with graft and I&D, complicated by thrombosis s/p thrombectomy and revision. PMHx of ED, HTN, and tobacco use.Completed ADLs/txfs with SPV and functional ambulation w/o AD. Reports friend can stay with pt and assist PRN. Patient will not be actively followed for occupational therapy services at this time, however may be seen if requested by physician for 1 more visit within 30 days to address any discharge or equipment needs.     Plan    Recommend Occupational Therapy d/c needs only     DC Equipment Recommendations: Tub / Shower Seat(has one, recommend he uses)  Discharge Recommendations: Anticipate that the patient will have no further occupational therapy needs after discharge from the hospital(as long as friend can assist PRN)      Objective     03/16/21 0805   Prior Living Situation   Prior Services Home-Independent   Housing / Facility 1 Story House   Steps Into Home 2   Bathroom Set up Bathtub / Shower Combination;Shower Chair   Equipment Owned Tub / Shower Seat   Lives with - Patient's Self Care Capacity Alone and Able to Care For Self   Comments Reports he has a friend, Elvia, who can assist him intermittently w/ I/ADLs PRN   Prior Level of ADL Function   Self Feeding Independent   Grooming / Hygiene Independent   Bathing Independent   Dressing Independent   Toileting Independent   Prior Level of IADL Function   Medication Management Independent   Laundry Independent   Kitchen Mobility Independent   Finances Independent   Home Management Independent   Shopping Independent   Prior Level Of  Mobility Independent Without Device in Community;Independent Without Device in Home   Driving / Transportation Driving Independent   Vitals   O2 Delivery Device None - Room Air   Pain 0 - 10 Group   Location Leg;Foot   Location Orientation Left   Therapist Pain Assessment Post Activity Pain Same as Prior to Activity;During Activity;Nurse Notified  (not quantified)   Cognition    Cognition / Consciousness WDL   Level of Consciousness Alert   Comments pleasant and cooperative   Passive ROM Upper Body   Passive ROM Upper Body WDL   Active ROM Upper Body   Active ROM Upper Body  WDL   Strength Upper Body   Upper Body Strength  WDL   Balance Assessment   Sitting Balance (Static) Good   Sitting Balance (Dynamic) Good   Standing Balance (Static) Good   Standing Balance (Dynamic) Fair +   Weight Shift Sitting Good   Weight Shift Standing Fair   Comments w/o AD; slight LOB in hallway, but able to self correct   Bed Mobility    Supine to Sit Supervised   Sit to Supine Supervised   Scooting Supervised   Rolling Supervised   Comments HOB slightly elevated   ADL Assessment   Grooming Supervision  (wiping face)   Lower Body Dressing Supervision   Toileting Supervision   Functional Mobility   Sit to Stand Supervised   Bed, Chair, Wheelchair Transfer Supervised   Toilet Transfers Supervised   Transfer Method Stand Step   Mobility w/o AD carrying own prevena and hills   Edema / Skin Assessment   Edema / Skin  Not Assessed   Comments wound vac in place pre/post session   Activity Tolerance   Sitting in Chair 1 min on toilet   Sitting Edge of Bed 3 min   Standing 15 min   Anticipated Discharge Equipment and Recommendations   DC Equipment Recommendations Tub / Shower Seat  (has one, recommend he uses)   Discharge Recommendations Anticipate that the patient will have no further occupational therapy needs after discharge from the hospital  (as long as friend can assist PRN)

## 2021-03-16 NOTE — CARE PLAN
Problem: Communication  Goal: The ability to communicate needs accurately and effectively will improve  Outcome: PROGRESSING AS EXPECTED     Problem: Venous Thromboembolism (VTW)/Deep Vein Thrombosis (DVT) Prevention:  Goal: Patient will participate in Venous Thrombosis (VTE)/Deep Vein Thrombosis (DVT)Prevention Measures  Outcome: PROGRESSING AS EXPECTED  Note: Ambulating and receiving prophylactic anticoag medications

## 2021-03-16 NOTE — FACE TO FACE
Face to Face Supporting Documentation - Home Health    The encounter with this patient was in whole or in part the primary reason for home health admission.    Date of encounter:   Patient:                    MRN:                       YOB: 2022  Antonio Cantu  [id number]  1960     Home health to see patient for:  Wound Care    Skilled need for:  Exacerbation of Chronic Disease State PVD    Skilled nursing interventions to include:  Wound Care    Homebound status evidenced by:  Need the aid of supportive devices such as crutches, canes, wheelchairs or walkers. Leaving home requires a considerable and taxing effort. There is a normal inability to leave the home.    Community Physician to provide follow up care: Joann Caromna P.A.-C.     Optional Interventions? No      I certify the face to face encounter for this home health care referral meets the CMS requirements and the encounter/clinical assessment with the patient was, in whole, or in part, for the medical condition(s) listed above, which is the primary reason for home health care. Based on my clinical findings: the service(s) are medically necessary, support the need for home health care, and the homebound criteria are met.  I certify that this patient has had a face to face encounter by myself.  Erick Galeas M.D. - NPI: [id number]

## 2021-03-16 NOTE — DISCHARGE PLANNING
Received Choice form at 1006  Agency/Facility Name: [facility]  Referral sent per Choice form @ 1958 -0360  Agency/Facility Name: [facility]  Spoke To: Reina  Outcome: Reina inquired on what insurance this PT has.      RN JEANE Tello notified and will clarify with Pt

## 2021-03-16 NOTE — ASSESSMENT & PLAN NOTE
Patient reports history of similar pruritic rash a few years ago on his flanks (now with hypopigmented scars)  Will trial aquaphor and triamcinolone ointment  Pamela prn but I don't think we carry that

## 2021-03-16 NOTE — WOUND TEAM
[facility]  Inpatient Services  Wound and Skin Care Progress note    Admission Date: 3/12/2021     Last order of IP CONSULT TO WOUND CARE was found on 3/12/2021 from Hospital Encounter on 3/12/2021     HPI, PMH, SH: Reviewed    Past Surgical History:   Procedure Laterality Date   • FEMORAL POPLITEAL BYPASS Left 3/14/2021    Procedure: Revision Left Femoral to Popliteal Bypass;  Surgeon: Brian Cameron M.D.;  Location: SURGERY [facility];  Service: Vascular   • ANGIOGRAM Left 3/14/2021    Procedure: ANGIOGRAM;  Surgeon: Brian Cameron M.D.;  Location: SURGERY [facility];  Service: Vascular   • THROMBECTOMY Left 3/14/2021    Procedure: THROMBECTOMY;  Surgeon: Brian Cameron M.D.;  Location: SURGERY [facility];  Service: Vascular   • FEMORAL POPLITEAL BYPASS Left 3/13/2021    Procedure: CREATION, BYPASS, ARTERIAL, FEMORAL TO POPLITEAL, USING GRAFT;  Surgeon: Brian Cameron M.D.;  Location: SURGERY [facility];  Service: Vascular   • ANGIOGRAM Left 3/13/2021    Procedure: ANGIOGRAM;  Surgeon: Brian Cameron M.D.;  Location: SURGERY [facility];  Service: Vascular   • IRRIGATION & DEBRIDEMENT GENERAL Left 3/13/2021    Procedure: IRRIGATION AND DEBRIDEMENT, WOUND;  Surgeon: Brian Cameron M.D.;  Location: SURGERY [facility];  Service: Vascular   • HAMMERTOE CORRECTION       Social History     Tobacco Use   • Smoking status: Current Every Day Smoker     Packs/day: 1.00     Years: 45.00     Pack years: 45.00     Types: Cigarettes   • Smokeless tobacco: Never Used   • Tobacco comment: 20 cig/day   Substance Use Topics   • Alcohol use: Yes     Alcohol/week: 3.0 - 3.6 oz     Types: 5 - 6 Cans of beer per week     Comment: 1-6 beers/day      Chief Complaint   Patient presents with   • Leg Swelling     Transfer from [facility] for L LE clot.  Leg is discolored and swollen x 1 week, pain with palpation.  U/S showed L arterial occlusion Arrived on heparin gtt.   • Open Wound     L lateral  "foot 1x1 wound, macerated edges.     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 Pt has a known wound to the left plantar foot as well. Per pt he was having wound treated by a podiatrist who removed what patient referred to as a \"Painful callused volcano\" with hair growing from it. Wound was initially debrided by podiatrist and pt reports there was then a cratered wound which he was caring for at home. Wound was debrided in OR during re-vascularization and was covered with betadine soaked gauze.     WOUND ASSESSMENT/LDA    Negative Pressure Wound Therapy 03/16/21 Surgical Foot Plantar Left (Active)   Vacuum Serial Number [id number] 03/16/21 1200   Dressing Type Small;Black Foam (Veraflo)    Number of Foam Pieces Used 1    Canister Changed Yes    NEXT Dressing Change/Treatment Date 03/18/21    VAC VeraFlo Irrigant Normal Saline    VAC VeraFlo Soak Time (mins) 6    VAC VeraFlo Instill Volume (ml) 16    VAC VeraFlo - Therapy Time (hrs) 3.5    VAC VeraFlo Pressure (mm/Hg) Intermittent;125 mmHg      Wound 03/13/21 Full Thickness Wound Foot Distal;Plantar Left (Active)   Wound Image    03/14/21 1100   Site Assessment Red;Purple    Periwound Assessment Intact    Margins Attached edges    Closure Secondary intention    Drainage Amount Small    Drainage Description Serosanguineous    Treatments Cleansed;Site care;Offloading    Wound Cleansing Approved Wound Cleanser    Periwound Protectant Skin Protectant Wipes to Periwound;Paste Ring;Drape    Dressing Cleansing/Solutions Normal Saline    Dressing Options Wound Vac    Dressing Changed New    Dressing Status Clean;Dry;Intact    Dressing Change/Treatment Frequency Tuesday, Thursday, Saturday, and As Needed    NEXT Dressing Change/Treatment Date 03/18/21    NEXT Weekly " Photo (Inpatient Only) 03/21/21    Non-staged Wound Description Full thickness    Wound Length (cm) 2.2 cm    Wound Width (cm) 1.5 cm    Wound Depth (cm) 1.2 cm    Wound Surface Area (cm^2) 3.3 cm^2    Wound Volume (cm^3) 3.96 cm^3    Shape Irregular Oval    Wound Odor None    Pulses Left;DP;2+    Exposed Structures Adipose;Connective tissue    WOUND NURSE ONLY - Time Spent with Patient (mins) 75      Vascular:    CARLA:   No results found.    Lab Values:    Lab Results   Component Value Date/Time    WBC 14.6 (H) 03/15/2021 05:32 AM    RBC 4.12 (L) 03/15/2021 05:32 AM    HEMOGLOBIN 13.1 (L) 03/15/2021 05:32 AM    HEMATOCRIT 40.2 (L) 03/15/2021 05:32 AM    CREACTPROT 10.91 (H) 03/16/2021 08:56 AM      Culture Results show:  No results found for this or any previous visit (from the past 720 hour(s)).    Pain Level/Medicated:  Pre medicated by bedside RN, Lidocaine applied to wound bed as well.     INTERVENTIONS BY WOUND TEAM:  Chart and images reviewed. Discussed with bedside RN. All areas of concern (based on picture review, LDA review and discussion with bedside RN) have been thoroughly assessed. Documentation of areas based on significant findings. This RN in to assess patient. Performed standard wound care which includes appropriate positioning, dressing removal and non-selective debridement. Pictures and measurements obtained weekly if/when required.  Preparation for Dressing removal: moist roll gauze in place  Cleansed with:  Wound cleanser and gauze.   Sharp debridement: NA  Venkata wound: Cleansed with wound cleanser and gauze, Prepped with No sting skin barrier One piece of plain aquacel applied to right venkata-wound and 1 paste ring around the wound; 1 piece of drape continued along the medial foot to dorsal side as bridge for trac pad.   Primary Dressing: One continuous piece of black veraflo foam applied into wound bed and continued up along the medial apsect of the foot.  Secondary (Outer) Dressing: Secured with  drape and trac pad. Fill assist used to initiate vac. Heel mepilex cut and applied over plantar foot and under trac pad to medial dorsal foot.     Interdisciplinary consultation: Patient, Bedside RN    EVALUATION / RATIONALE FOR TREATMENT:  Most Recent Date:  3/16/21: NPWT veraflo applied to plantar foot to cleanse wound and assist in granular tissue development.     3/14/21: Pt underwent re-vascularization and I&D of left plantar foot wound yesterday. Unable to palpate or doppler pulses today. Waiting on Dr. Cameron to make recommendations. Hydrogel gauze applied to provide for a moist wound bed. Pt may benefit from NPWT application. Foot is extremely painful to touch.     Goals: Steady decrease in wound area and depth weekly.    WOUND TEAM PLAN OF CARE ([X] for frequency of wound follow up,):   Nursing to follow orders written for wound care. Contact wound team if area fails to progress, deteriorates or with any questions/concerns  Dressing changes by wound team:                   Follow up 3 times weekly:   X             NPWT change 3 times weekly:     Follow up 1-2 times weekly:      Follow up Bi-Monthly:                   Follow up as needed:     Other (explain):     NURSING PLAN OF CARE ORDERS (X):  Dressing changes: See Dressing Care orders: X  Skin care: See Skin Care orders:   RN Prevention Protocol: X  Rectal tube care: See Rectal Tube Care orders:   Other orders:    RSKIN:   CURRENTLY IN PLACE (X), APPLIED THIS VISIT (A), ORDERED (O):   Q shift Cody:  X  Q shift pressure point assessments:  X    Surface/Positioning   Pressure redistribution mattress   X         Low Airloss          Bariatric foam      Bariatric IVIS     Waffle cushion        Waffle Overlay   O       Reposition q 2 hours      TAPs Turning system     Z Toni Pillow     Offloading/Redistribution   Sacral Mepilex (Silicone dressing)   ANA  Heel Mepilex (Silicone dressing)    O     Heel float boots (Prevalon boot)             Float Heels off  Bed with Pillows           Respiratory NA room air  Silicone O2 tubing         Gray Foam Ear protectors     Cannula fixation Device (Tender )          High flow offloading Clip    Elastic head band offloading device      Anchorfast                                                         Trach with Optifoam split foam             Containment/Moisture Prevention NA continent    Rectal tube or BMS    Purwick/Condom Cath        Lebron Catheter    Barrier wipes           Barrier paste       Antifungal tx      Interdry        Mobilization       Up to chair        Ambulate   X   PT/OT      Nutrition      Dietician        Diabetes Education      PO   X   TF     TPN     NPO   # days     Other        Anticipated discharge plans:   LTACH:        SNF/Rehab:                  Home Health Care:           Outpatient Wound Center:   X         Self/Family Care:        Other:

## 2021-03-16 NOTE — PROGRESS NOTES
Pt was unable to void, bladder scan indicated >900mL in bladder. Lebron catheter placed per active order. Pt tolerated procedure.    Pt reported elevated pain this AM, medicated per MAR. No complaints of SOB. Tolerating diet without n/v. Prevena wound vac to LLE patent and without drainage. Bed locked and in low position.

## 2021-03-16 NOTE — DISCHARGE PLANNING
Anticipated Discharge Disposition: Home    Action: RN JEANE spoke with the patient at bedside. The patient lives alone in a single story home. The patient was independent with ADLs prior to admission. The patient denies any home oxygen or DME. The patient has a good support system with friends and family.     Home health discussed with the patient. Choice received for: [facility], 2 [facility]. The patient feels comfortable going home without home health if there is no accepting agency as he has enough support from friends and family. The patient would be interested in outpatient wound clinic if needed. HH choice form sent to [facility].     Barriers to Discharge: medical clearance, home health vs. Outpatient wound clinic    Plan: Follow up with medical team and DPA    Care Transition Team Assessment    Information Source  Orientation : Oriented x 4  Information Given By: Patient  Who is responsible for making decisions for patient? : Patient    Readmission Evaluation  Is this a readmission?: Yes - unplanned readmission    Elopement Risk  Legal Hold: No  Ambulatory or Self Mobile in Wheelchair: Yes  Disoriented: No  Psychiatric Symptoms: None  History of Wandering: No  Elopement this Admit: No  Vocalizing Wanting to Leave: No  Displays Behaviors, Body Language Wanting to Leave: No-Not at Risk for Elopement  Elopement Risk: Not at Risk for Elopement    Interdisciplinary Discharge Planning  Primary Care Physician: Joann Carmona PAC  Lives with - Patient's Self Care Capacity: Alone and Able to Care For Self  Patient or legal guardian wants to designate a caregiver: No  Support Systems: Family Member(s), Friends / Neighbors  Housing / Facility: 1 Story House  Do You Take your Prescribed Medications Regularly: Yes  Able to Return to Previous ADL's: Yes  Mobility Issues: No  Prior Services: None  Patient Prefers to be Discharged to:: Home  Durable Medical Equipment: Not Applicable    Discharge Preparedness  What  is your plan after discharge?: Uncertain - pending medical team collaboration  Prior Functional Level: Ambulatory, Independent with Activities of Daily Living  Difficulity with ADLs: None  Difficulity with IADLs: None    Functional Assesment  Prior Functional Level: Ambulatory, Independent with Activities of Daily Living    Finances  Financial Barriers to Discharge: No    Vision / Hearing Impairment  Vision Impairment : No  Hearing Impairment : Yes  Hearing Impairment: Left Ear, Right Ear  Does Pt Need Special Equipment for the Hearing Impaired?: No         Advance Directive  Advance Directive?: None  Advance Directive offered?: AD Booklet refused    Domestic Abuse  Have you ever been the victim of abuse or violence?: No  Physical Abuse or Sexual Abuse: No  Verbal Abuse or Emotional Abuse: No  Possible Abuse/Neglect Reported to:: Not Applicable         Discharge Risks or Barriers  Patient risk factors: Readmission    Anticipated Discharge Information  Discharge Disposition: Still a Patient (30)

## 2021-03-17 ENCOUNTER — APPOINTMENT (OUTPATIENT)
Dept: RADIOLOGY | Facility: MEDICAL CENTER | Age: 61
DRG: 253 | End: 2021-03-17
Attending: INTERNAL MEDICINE
Payer: OTHER MISCELLANEOUS

## 2021-03-17 ENCOUNTER — APPOINTMENT (OUTPATIENT)
Dept: RADIOLOGY | Facility: MEDICAL CENTER | Age: 61
DRG: 253 | End: 2021-03-17
Attending: SURGERY
Payer: OTHER MISCELLANEOUS

## 2021-03-17 PROBLEM — S91.339A PENETRATING FOOT WOUND: Status: ACTIVE | Noted: 2021-03-17

## 2021-03-17 LAB
ALBUMIN SERPL BCP-MCNC: 3.1 G/DL (ref 3.2–4.9)
ALBUMIN/GLOB SERPL: 1 G/DL
ALP SERPL-CCNC: 69 U/L (ref 30–99)
ALT SERPL-CCNC: 14 U/L (ref 2–50)
ANION GAP SERPL CALC-SCNC: 8 MMOL/L (ref 7–16)
AST SERPL-CCNC: 19 U/L (ref 12–45)
BASOPHILS # BLD AUTO: 0.4 % (ref 0–1.8)
BASOPHILS # BLD: 0.04 K/UL (ref 0–0.12)
BILIRUB SERPL-MCNC: 0.6 MG/DL (ref 0.1–1.5)
BUN SERPL-MCNC: 7 MG/DL (ref 8–22)
CALCIUM SERPL-MCNC: 9.9 MG/DL (ref 8.5–10.5)
CHLORIDE SERPL-SCNC: 102 MMOL/L (ref 96–112)
CO2 SERPL-SCNC: 24 MMOL/L (ref 20–33)
CREAT SERPL-MCNC: 0.6 MG/DL (ref 0.5–1.4)
CRP SERPL HS-MCNC: 8.74 MG/DL (ref 0–0.75)
EOSINOPHIL # BLD AUTO: 0.29 K/UL (ref 0–0.51)
EOSINOPHIL NFR BLD: 2.8 % (ref 0–6.9)
ERYTHROCYTE [DISTWIDTH] IN BLOOD BY AUTOMATED COUNT: 44.5 FL (ref 35.9–50)
EST. AVERAGE GLUCOSE BLD GHB EST-MCNC: 126 MG/DL
GLOBULIN SER CALC-MCNC: 3.1 G/DL (ref 1.9–3.5)
GLUCOSE SERPL-MCNC: 96 MG/DL (ref 65–99)
HBA1C MFR BLD: 6 % (ref 4–5.6)
HCT VFR BLD AUTO: 38.8 % (ref 42–52)
HGB BLD-MCNC: 12.7 G/DL (ref 14–18)
IMM GRANULOCYTES # BLD AUTO: 0.09 K/UL (ref 0–0.11)
IMM GRANULOCYTES NFR BLD AUTO: 0.9 % (ref 0–0.9)
LYMPHOCYTES # BLD AUTO: 1.64 K/UL (ref 1–4.8)
LYMPHOCYTES NFR BLD: 15.6 % (ref 22–41)
MCH RBC QN AUTO: 31.2 PG (ref 27–33)
MCHC RBC AUTO-ENTMCNC: 32.7 G/DL (ref 33.7–35.3)
MCV RBC AUTO: 95.3 FL (ref 81.4–97.8)
MONOCYTES # BLD AUTO: 1.27 K/UL (ref 0–0.85)
MONOCYTES NFR BLD AUTO: 12.1 % (ref 0–13.4)
NEUTROPHILS # BLD AUTO: 7.16 K/UL (ref 1.82–7.42)
NEUTROPHILS NFR BLD: 68.2 % (ref 44–72)
NRBC # BLD AUTO: 0 K/UL
NRBC BLD-RTO: 0 /100 WBC
OSMOLALITY SERPL: 278 MOSM/KG H2O (ref 278–298)
PLATELET # BLD AUTO: 266 K/UL (ref 164–446)
PMV BLD AUTO: 9.4 FL (ref 9–12.9)
POTASSIUM SERPL-SCNC: 4.3 MMOL/L (ref 3.6–5.5)
PROCALCITONIN SERPL-MCNC: <0.05 NG/ML
PROT SERPL-MCNC: 6.2 G/DL (ref 6–8.2)
RBC # BLD AUTO: 4.07 M/UL (ref 4.7–6.1)
SODIUM SERPL-SCNC: 134 MMOL/L (ref 135–145)
WBC # BLD AUTO: 10.5 K/UL (ref 4.8–10.8)

## 2021-03-17 PROCEDURE — 36415 COLL VENOUS BLD VENIPUNCTURE: CPT

## 2021-03-17 PROCEDURE — 700111 HCHG RX REV CODE 636 W/ 250 OVERRIDE (IP): Performed by: INTERNAL MEDICINE

## 2021-03-17 PROCEDURE — 99233 SBSQ HOSP IP/OBS HIGH 50: CPT | Performed by: INTERNAL MEDICINE

## 2021-03-17 PROCEDURE — A9270 NON-COVERED ITEM OR SERVICE: HCPCS | Performed by: INTERNAL MEDICINE

## 2021-03-17 PROCEDURE — 700102 HCHG RX REV CODE 250 W/ 637 OVERRIDE(OP): Performed by: INTERNAL MEDICINE

## 2021-03-17 PROCEDURE — A9576 INJ PROHANCE MULTIPACK: HCPCS | Performed by: INTERNAL MEDICINE

## 2021-03-17 PROCEDURE — 93926 LOWER EXTREMITY STUDY: CPT | Mod: LT

## 2021-03-17 PROCEDURE — 700102 HCHG RX REV CODE 250 W/ 637 OVERRIDE(OP): Performed by: HOSPITALIST

## 2021-03-17 PROCEDURE — 700102 HCHG RX REV CODE 250 W/ 637 OVERRIDE(OP): Performed by: SURGERY

## 2021-03-17 PROCEDURE — 80053 COMPREHEN METABOLIC PANEL: CPT

## 2021-03-17 PROCEDURE — A9270 NON-COVERED ITEM OR SERVICE: HCPCS | Performed by: SURGERY

## 2021-03-17 PROCEDURE — 700117 HCHG RX CONTRAST REV CODE 255: Performed by: INTERNAL MEDICINE

## 2021-03-17 PROCEDURE — 73720 MRI LWR EXTREMITY W/O&W/DYE: CPT | Mod: LT

## 2021-03-17 PROCEDURE — 83036 HEMOGLOBIN GLYCOSYLATED A1C: CPT

## 2021-03-17 PROCEDURE — 770006 HCHG ROOM/CARE - MED/SURG/GYN SEMI*

## 2021-03-17 PROCEDURE — 700111 HCHG RX REV CODE 636 W/ 250 OVERRIDE (IP): Performed by: SURGERY

## 2021-03-17 PROCEDURE — 83930 ASSAY OF BLOOD OSMOLALITY: CPT

## 2021-03-17 PROCEDURE — 700105 HCHG RX REV CODE 258: Performed by: INTERNAL MEDICINE

## 2021-03-17 PROCEDURE — A9270 NON-COVERED ITEM OR SERVICE: HCPCS | Performed by: HOSPITALIST

## 2021-03-17 PROCEDURE — 84145 PROCALCITONIN (PCT): CPT

## 2021-03-17 PROCEDURE — 86140 C-REACTIVE PROTEIN: CPT

## 2021-03-17 PROCEDURE — 85025 COMPLETE CBC W/AUTO DIFF WBC: CPT

## 2021-03-17 PROCEDURE — 700105 HCHG RX REV CODE 258: Performed by: HOSPITALIST

## 2021-03-17 RX ORDER — BISACODYL 10 MG
10 SUPPOSITORY, RECTAL RECTAL
Status: DISCONTINUED | OUTPATIENT
Start: 2021-03-17 | End: 2021-03-20 | Stop reason: HOSPADM

## 2021-03-17 RX ORDER — MORPHINE SULFATE 4 MG/ML
2-3 INJECTION, SOLUTION INTRAMUSCULAR; INTRAVENOUS EVERY 4 HOURS PRN
Status: DISCONTINUED | OUTPATIENT
Start: 2021-03-17 | End: 2021-03-20 | Stop reason: HOSPADM

## 2021-03-17 RX ORDER — POLYETHYLENE GLYCOL 3350 17 G/17G
1 POWDER, FOR SOLUTION ORAL DAILY
Status: DISCONTINUED | OUTPATIENT
Start: 2021-03-17 | End: 2021-03-20 | Stop reason: HOSPADM

## 2021-03-17 RX ORDER — AMOXICILLIN 250 MG
2 CAPSULE ORAL 2 TIMES DAILY
Status: DISCONTINUED | OUTPATIENT
Start: 2021-03-17 | End: 2021-03-20 | Stop reason: HOSPADM

## 2021-03-17 RX ADMIN — MORPHINE SULFATE 3 MG: 4 INJECTION INTRAVENOUS at 15:09

## 2021-03-17 RX ADMIN — ACETAMINOPHEN 1000 MG: 500 TABLET, FILM COATED ORAL at 17:39

## 2021-03-17 RX ADMIN — OXYCODONE HYDROCHLORIDE 10 MG: 10 TABLET ORAL at 13:26

## 2021-03-17 RX ADMIN — OXYCODONE HYDROCHLORIDE 10 MG: 10 TABLET ORAL at 22:07

## 2021-03-17 RX ADMIN — SODIUM CHLORIDE 3 G: 900 INJECTION INTRAVENOUS at 05:08

## 2021-03-17 RX ADMIN — SODIUM CHLORIDE 3 G: 900 INJECTION INTRAVENOUS at 22:08

## 2021-03-17 RX ADMIN — ATORVASTATIN CALCIUM 40 MG: 40 TABLET, FILM COATED ORAL at 17:40

## 2021-03-17 RX ADMIN — ACETAMINOPHEN 1000 MG: 500 TABLET, FILM COATED ORAL at 12:48

## 2021-03-17 RX ADMIN — FENTANYL CITRATE 50 MCG: 50 INJECTION, SOLUTION INTRAMUSCULAR; INTRAVENOUS at 00:22

## 2021-03-17 RX ADMIN — SODIUM CHLORIDE 3 G: 900 INJECTION INTRAVENOUS at 09:40

## 2021-03-17 RX ADMIN — TRIAMCINOLONE ACETONIDE: 1 OINTMENT TOPICAL at 22:07

## 2021-03-17 RX ADMIN — TRIAMCINOLONE ACETONIDE: 1 OINTMENT TOPICAL at 05:09

## 2021-03-17 RX ADMIN — SODIUM CHLORIDE, POTASSIUM CHLORIDE, SODIUM LACTATE AND CALCIUM CHLORIDE: 600; 310; 30; 20 INJECTION, SOLUTION INTRAVENOUS at 09:43

## 2021-03-17 RX ADMIN — OXYCODONE HYDROCHLORIDE 10 MG: 10 TABLET ORAL at 09:39

## 2021-03-17 RX ADMIN — MORPHINE SULFATE 3 MG: 4 INJECTION INTRAVENOUS at 20:26

## 2021-03-17 RX ADMIN — OXYCODONE HYDROCHLORIDE 10 MG: 10 TABLET ORAL at 06:28

## 2021-03-17 RX ADMIN — ENOXAPARIN SODIUM 40 MG: 40 INJECTION SUBCUTANEOUS at 05:08

## 2021-03-17 RX ADMIN — SODIUM CHLORIDE 3 G: 900 INJECTION INTRAVENOUS at 15:21

## 2021-03-17 RX ADMIN — GADOTERIDOL 20 ML: 279.3 INJECTION, SOLUTION INTRAVENOUS at 17:02

## 2021-03-17 RX ADMIN — ACETAMINOPHEN 1000 MG: 500 TABLET, FILM COATED ORAL at 22:53

## 2021-03-17 RX ADMIN — FENTANYL CITRATE 50 MCG: 50 INJECTION, SOLUTION INTRAMUSCULAR; INTRAVENOUS at 12:48

## 2021-03-17 RX ADMIN — GABAPENTIN 300 MG: 300 CAPSULE ORAL at 20:26

## 2021-03-17 RX ADMIN — OXYCODONE HYDROCHLORIDE 10 MG: 10 TABLET ORAL at 17:40

## 2021-03-17 RX ADMIN — OXYCODONE HYDROCHLORIDE 10 MG: 10 TABLET ORAL at 03:18

## 2021-03-17 RX ADMIN — VANCOMYCIN HYDROCHLORIDE 2250 MG: 500 INJECTION, POWDER, LYOPHILIZED, FOR SOLUTION INTRAVENOUS at 17:48

## 2021-03-17 RX ADMIN — ACETAMINOPHEN 1000 MG: 500 TABLET, FILM COATED ORAL at 00:22

## 2021-03-17 ASSESSMENT — ENCOUNTER SYMPTOMS
WEIGHT LOSS: 0
FEVER: 0
NEUROLOGICAL NEGATIVE: 1
CARDIOVASCULAR NEGATIVE: 1
MYALGIAS: 1
GASTROINTESTINAL NEGATIVE: 1
EYES NEGATIVE: 1
RESPIRATORY NEGATIVE: 1
PSYCHIATRIC NEGATIVE: 1
DIAPHORESIS: 0

## 2021-03-17 ASSESSMENT — PAIN DESCRIPTION - PAIN TYPE: TYPE: ACUTE PAIN

## 2021-03-17 NOTE — PROGRESS NOTES
Pt A&Ox4.  Rates pain 5-6/10, provided ice pack.  L pedal and post tibial pulse heard by doppler, unable to palpate this AM. Large swelling to L thigh with bruising, outlined for measurement.   Prevena vac to incision, wound vac to L foot wound.  Tolerating diet, denies n/v. + bowel sounds, + flatus, LBM 3/16.  Saturating >90% on RA.  Pt ambulates SBA with FWW.  Updated on plan of care. Safety education provided. Bed locked in low. Call light within reach. Rounding in place.

## 2021-03-17 NOTE — DISCHARGE PLANNING
Anticipated Discharge Disposition: TBD    Action: RN CM called [facility] to find out more information about patient's insurance agency in order to provide home health with name of insurance company. Per [facility] the insurance is [employer] and it is a limited plan. Per [facility] the patient is being screened for additional insurance coverage to assist in medical costs.    Barriers to Discharge: medical clearance    Plan: Follow up with medical team

## 2021-03-17 NOTE — ASSESSMENT & PLAN NOTE
On the left foot, deep wound.  No diabetes A1c 6 however he has significant peripheral vascular disease.  X-ray showed erosion involving the medial head of the first metatarsal, possible osteomyelitis  Case was discussed with orthopedic,   Blood cultures negative  Continue Unasyn and add vancomycin  MRI did not show any signs of osteomyelitis, discontinue vancomycin continue Unasyn for 7 days

## 2021-03-17 NOTE — PROGRESS NOTES
Assumed care of patient. Report received. Assessment complete.  All questions and concerns addressed.    Pt's pain elevated this shift, medicated per MAR. No complaints of SOB or n/v. Wound vac to L foot patent and maintaining suction. Prevena wound vac to LLE patent and maintaining suction. Light bruising noted to left groin, scrotum, and left buttocks. Pedal and post-tibial pulses palpable +1.

## 2021-03-17 NOTE — PROGRESS NOTES
"Vascular    Events  3/15: RHONDA, pain controlled, was up ambulating today, bypass patent  3/16: RHONDA, pain controlled, was up ambulating today, bypass patent, veraflo vac applied    Vitals  BP (!) 99/62   Pulse 95   Temp 36.7 °C (98 °F) (Temporal)   Resp 17   Ht 1.956 m (6' 5\")   Wt 94.6 kg (208 lb 8.9 oz)   SpO2 94%   BMI 24.73 kg/m²     Exam  Bypass patent  Bandages CDI    Some erythema and ecchymosis on the medial upper thigh - unclear cause    Labs  WBC 14 -> 13 today  Hgb 13 stable  Creatinine normal    A/P)  3/13: LLE Fem-TPT bypass with in situ vein  3/14: LLE fem-TPT thrombectomy and revision    Feeling well  Local care for the foot  Continue IV ABx    Anticipating discharge home on PO antibiotics in 2-3 days depending on clinical progress    Will need home health for wound care       Brian Cameron MD  [facility] (General and Vascular Surgery)  Cell: [phone] (text or call is fine, if you don't reach me please try my office)  Office: [phone]  __________________________________________________________________  Patient:Antonio Cantu   MRN:[id number]   CSN:[id number]        "

## 2021-03-17 NOTE — PROGRESS NOTES
Patient AOx4, calm and cooperative with cares. Calls for needs. prevena to L medial aspect of leg from mid calf to mid shin, wound vac placed today to bottom of L foot. Patient tolerating well. Up ambulating to BR and in hallways. Stated pain is well controlled.

## 2021-03-17 NOTE — PROGRESS NOTES
"Pharmacy Kinetics 60 y.o. male on vancomycin day # 1 (3/17/2021)    Currently on Vancomycin new start  Provider specified end date: TBD    Indication for Treatment: osteo    Pertinent history per medical record: Admitted on 3/12/2021 for left leg pain. CTA performed at OSH showing occlusion of femoral artery. Patient underwent left lower extremity bypass, but subsequently had to return to OR for concern for thrombosis.    Other antibiotics: Unasyn    Allergies: Patient has no known allergies.     List concerns for renal function (possible concerns include abnormal LFTs, BUN/SCr ratio > 20:1, CHF, obesity, malnutrition/low albumin, hypermetabolic state (SIRS), pressors/hypotension, nephrotoxic drugs, etc.): Low albumin, use with Unasyn    Pertinent cultures to date:   3/16: BC X 2 NGTD    MRSA nares swab if pneumonia is a concern (ordered/positive/negative/n-a): N/A    Recent Labs     03/15/21  0532 21  0856 21  0606   WBC 14.6* 12.8* 10.5   NEUTSPOLYS 85.30* 77.80* 68.20     Recent Labs     03/15/21  0532 21  0222 21  0606   BUN 7* 8 7*   CREATININE 0.65 0.76 0.60   ALBUMIN  --   --  3.1*     No results for input(s): VANCOTROUGH, VANCOPEAK, VANCORANDOM in the last 72 hours.    Intake/Output Summary (Last 24 hours) at 3/17/2021 1459  Last data filed at 3/17/2021 1335  Gross per 24 hour   Intake 240 ml   Output 6950 ml   Net -6710 ml      /70   Pulse 69   Temp 36.9 °C (98.5 °F) (Temporal)   Resp 17   Ht 1.956 m (6' 5\")   Wt 94.6 kg (208 lb 8.9 oz)   SpO2 95%  Temp (24hrs), Av.7 °C (98 °F), Min:36.5 °C (97.7 °F), Max:36.9 °C (98.5 °F)      A/P   1. Vancomycin dose change: Vancomycin 2250 mg IV X 1 followed by vancomycin 1500 mg (15 mg/kg) q12H (0330, 1530)  2. Next vancomycin level: ~ 2 days or sooner pending renal function (will need to be ordered)  3. Goal trough: 10-15 mcg/mL  4. Comments: New start vancomycin. Pharmacy consult listed as osteomyelitis for indication. MD note " states x-ray showing possible osteo. No h/o vancomycin usage listed in Epic. Will start vancomycin 1500 mg (15 mg/kg) q12H per protocol and obtain vancomycin level prior to steady-state. Pharmacy will continue to monitor.    Lexy Pantoja, JaelynD, BCPS

## 2021-03-17 NOTE — DISCHARGE PLANNING
Per RN JEANE Tello, Pt's insurance is called [employer]   P: 3     Agency/Facility Name: [facility]   Outcome: Left vmail for intake informing them of insurance information noted above

## 2021-03-17 NOTE — PROGRESS NOTES
Notified Dr. Cameron regarding pts concerns of new swelling, larger area of bruising, more pain, and needed to doppler pulses. Pt made NPO per MD requests.

## 2021-03-17 NOTE — PROGRESS NOTES
[facility] Medicine Daily Progress Note    Date of Service  3/17/2021    Chief Complaint  60 y.o. male admitted 3/12/2021 with left leg pain    Hospital Course  60-year-old male with a history of BPH, tobacco use, erectile dysfunction, hypertension, dyslipidemia, polycythemia who presented to [facility] on 3/12 with left leg pain and wound where a CTA was performed which showed occlusion of the femoral artery running distally.  He was transferred to [facility].  He was started on heparin drip.  Vascular surgery was consulted.  Dr. Cameron performed left lower extremity bypass on 3/13/2021, however after the procedure at night noted to have bleeding at left groin site, sand bag applied, vitals stable and H/H stable.  During surgery and postop unable to find pulses on doppler, concern for thrombosis. He was taken back to OR on 3/14/21 for thrombectomy and revision.  He then had prevena placed.  Pulses now able to doppler, wound care was consulted and wound VAC was placed, his swelling was not improved well, artery Doppler on 3/17 did not show aneurysm however showed hematoma.     Patient has a deep wound on the left foot, x-ray showed possible osteomyelitis, MRI is pending, Unasyn and vancomycin were started.    Patient was evaluated by PT and OT and no need for therapy however home health was ordered for wound care since the patient lives by himself.    Patient has history of heavy smoking for years, I had a long discussion with the patient about importance of quitting smoking, and discussed the risk including not limited to worsening peripheral vascular disease, COPD and lung cancer, he understood however he is not willing to quit.        Interval Problem Update  -Evaluated examined the patient at bedside, patient feels okay  -Worsening swelling on the left leg, Doppler ultrasound did not show aneurysm however showed hematoma.   -Vascular surgery on board.   -Improving leukocytosis after start  antibiotics, x-ray showed possible osteomyelitis, case was discussed with orthopedic team Dr. Knox, MRI is pending, vancomycin was added  -Morphine was added to control his pain.    Consultants/Specialty  Vascular surgeon    Code Status  Full Code    Disposition  Home after vascular surgery clearance.  Patient minute long term of IV antibiotics for possible osteomyelitis.    Review of Systems  Review of Systems   Constitutional: Negative for diaphoresis, fever, malaise/fatigue and weight loss.   HENT: Negative.    Eyes: Negative.    Respiratory: Negative.    Cardiovascular: Negative.    Gastrointestinal: Negative.    Genitourinary: Negative.    Musculoskeletal: Positive for myalgias.        Swelling and redness on the left leg   Skin: Positive for rash.   Neurological: Negative.    Psychiatric/Behavioral: Negative.         Physical Exam  Temp:  [36.5 °C (97.7 °F)-36.9 °C (98.5 °F)] 36.9 °C (98.5 °F)  Pulse:  [69-95] 69  Resp:  [17] 17  BP: ()/(62-80) 126/70  SpO2:  [91 %-98 %] 95 %    Physical Exam  Constitutional:       General: He is not in acute distress.     Appearance: He is not ill-appearing or diaphoretic.   Cardiovascular:      Rate and Rhythm: Normal rate.      Heart sounds: No murmur.   Pulmonary:      Effort: No respiratory distress.      Breath sounds: Wheezing present. No rales.   Abdominal:      General: Bowel sounds are normal. There is no distension.      Palpations: Abdomen is soft.      Tenderness: There is no abdominal tenderness. There is no guarding.   Musculoskeletal:         General: Swelling, tenderness and signs of injury present.      Cervical back: No rigidity.      Right lower leg: No edema.      Left lower leg: Edema present.   Skin:     General: Skin is warm.      Findings: Erythema and lesion present.      Comments: Wound VAC on the left leg  Swelling on the left leg, worsening today.    Neurological:      General: No focal deficit present.      Mental Status: He is alert  and oriented to person, place, and time.      Cranial Nerves: No cranial nerve deficit.      Motor: No weakness.                 Fluids    Intake/Output Summary (Last 24 hours) at 3/17/2021 1504  Last data filed at 3/17/2021 1335  Gross per 24 hour   Intake 240 ml   Output 6250 ml   Net -6010 ml       Laboratory  Recent Labs     03/15/21  0532 03/16/21  0856 03/17/21  0606   WBC 14.6* 12.8* 10.5   RBC 4.12* 4.17* 4.07*   HEMOGLOBIN 13.1* 13.1* 12.7*   HEMATOCRIT 40.2* 40.1* 38.8*   MCV 97.6 96.2 95.3   MCH 31.8 31.4 31.2   MCHC 32.6* 32.7* 32.7*   RDW 46.4 45.8 44.5   PLATELETCT 224 276 266   MPV 9.3 10.0 9.4     Recent Labs     03/15/21  0532 03/16/21  0222 03/17/21  0606   SODIUM 132* 134* 134*   POTASSIUM 4.4 4.2 4.3   CHLORIDE 98 98 102   CO2 27 27 24   GLUCOSE 131* 119* 96   BUN 7* 8 7*   CREATININE 0.65 0.76 0.60   CALCIUM 9.8 9.8 9.9                   Imaging  US-EXTREMITY ARTERY LOWER UNILAT LEFT   Final Result      DX-FOOT-2- LEFT   Final Result      Periarticular erosion involving the medial head of the first metatarsal. This can be seen in the setting of osteomyelitis.      Degenerative changes.      Periarticular osteopenia.      Mild soft tissue swelling.         DX-PORTABLE FLUORO > 1 HOUR   Final Result      Portable fluoroscopy utilized for 44 seconds.         INTERPRETING LOCATION: [address] [place] [address], [facility]      DX-PORTABLE FLUORO > 1 HOUR   Final Result      Digitized intraoperative radiograph is submitted for review.  This examination is not for diagnostic purpose but for guidance during a surgical procedure.         CT-FOREIGN FILM CAT SCAN   Final Result      MR-FOOT-WITH LEFT    (Results Pending)        Assessment/Plan  * Femoral artery occlusion (HCC)  Assessment & Plan  Came with left leg pain and discoloration  CTA showed occlusion on the artery  Bypass was done by vascular surgeon on 3/13  Thrombectomy and revision on 3/14  Continue atorvastatin  S/p conversation about smoking cessation  he does appear motivated.  Unasyn for his wound started on 3/16  Artery Doppler on 3/17 did not show aneurysm however showed hematoma.   Pain control with oxycodone and morphine    Penetrating foot wound  Assessment & Plan  On the left foot, deep wound.  No diabetes A1c 6 however he has significant peripheral vascular disease.  X-ray showed erosion involving the medial head of the first metatarsal, possible osteomyelitis  Case was discussed with orthopedic, MRI was ordered  Blood cultures negative  Continue Unasyn and add vancomycin  Will consider ID consult after MRI result      Leukocytosis  Assessment & Plan  Since admission worsening 14  Improving after starting antibiotics  Patient has a wound on the left foot  Start with Unasyn on 3/16 and added vancomycin on 3/17 for possible osteomyelitis  Blood culture on 3/16 is negative till now  Labs daily    Hyponatremia  Assessment & Plan  Fluid restriction  Labs daily    Essential hypertension- (present on admission)  Assessment & Plan  Continue lisinopril  Monitor and titrate    Normocytic anemia  Assessment & Plan  Likely in setting of surgeries  CTM    Mixed hyperlipidemia- (present on admission)  Assessment & Plan  Will start statin since not currently on one and has severe PAD    Rash of back- (present on admission)  Assessment & Plan  Patient reports history of similar pruritic rash a few years ago on his flanks (now with hypopigmented scars)  Will trial aquaphor and triamcinolone ointment  Sarna prn but I don't think we carry that    Acute respiratory insufficiency  Assessment & Plan  Resolved  After surgery  On NC  IS  Wean as tolerated    Polycythemia- (present on admission)  Assessment & Plan  Patient has a history of polycythemia, likely related to heavy smoking   however he has never been phlebotomized.  Hemoglobin here is 17 on admission  Now anemic after surgeries    Current every day smoker- (present on admission)  Assessment & Plan  I counseled the  patient x10 minutes.  He has been smoking for many years but has been trying to slowly quit.  He is currently down to about 10 cigarettes daily.  He has not had any luck with Chantix, he has tried patches but has not tried gum.  As needed replacement  Continue to support and encourage him.       VTE prophylaxis: Lovenox      Interventions to be considered in all patients in order to minimize the risk of delirium.   -do not disturb patient (vitals or lab draws) between the hours of 10 PM and 6 AM.  -ideally the patient should not sleep during the day and we should avoid day time naps.   -up in chair for meals  -ambulate at least three times daily, as able  -watch for constipation  -timed voiding - ask patient is she would like to go to the bathroom q 2-3 hours, except during the do not disturb hours.   -remove all necessary lines (central lines, peripheral IVs, feeding tubes, hills catheters)  -unless patient has shown harm to self or others I would recommend against use of restraints - either chemical or physical (antipsychotics)   -minimize polypharmacy, do not dose medication during sleep hours

## 2021-03-17 NOTE — CARE PLAN
Problem: Infection  Goal: Will remain free from infection  Outcome: PROGRESSING AS EXPECTED  Note: IV abx in use, monitoring labs, vital signs. Standard precautions in use. Wound vac in place.      Problem: Pain Management  Goal: Pain level will decrease to patient's comfort goal  Outcome: PROGRESSING SLOWER THAN EXPECTED  Note: Pt reports increasing pain, significant. Medicating per MAR with oxy, PRN IV medications available for breakthrough.

## 2021-03-18 LAB
ANION GAP SERPL CALC-SCNC: 6 MMOL/L (ref 7–16)
BASOPHILS # BLD AUTO: 0.4 % (ref 0–1.8)
BASOPHILS # BLD: 0.05 K/UL (ref 0–0.12)
BUN SERPL-MCNC: 8 MG/DL (ref 8–22)
CALCIUM SERPL-MCNC: 10 MG/DL (ref 8.5–10.5)
CHLORIDE SERPL-SCNC: 100 MMOL/L (ref 96–112)
CO2 SERPL-SCNC: 27 MMOL/L (ref 20–33)
CREAT SERPL-MCNC: 0.66 MG/DL (ref 0.5–1.4)
CRP SERPL HS-MCNC: 8.16 MG/DL (ref 0–0.75)
EOSINOPHIL # BLD AUTO: 0.32 K/UL (ref 0–0.51)
EOSINOPHIL NFR BLD: 2.7 % (ref 0–6.9)
ERYTHROCYTE [DISTWIDTH] IN BLOOD BY AUTOMATED COUNT: 45 FL (ref 35.9–50)
GLUCOSE SERPL-MCNC: 113 MG/DL (ref 65–99)
HCT VFR BLD AUTO: 39.1 % (ref 42–52)
HGB BLD-MCNC: 12.6 G/DL (ref 14–18)
IMM GRANULOCYTES # BLD AUTO: 0.11 K/UL (ref 0–0.11)
IMM GRANULOCYTES NFR BLD AUTO: 0.9 % (ref 0–0.9)
LYMPHOCYTES # BLD AUTO: 1.33 K/UL (ref 1–4.8)
LYMPHOCYTES NFR BLD: 11.3 % (ref 22–41)
MCH RBC QN AUTO: 31 PG (ref 27–33)
MCHC RBC AUTO-ENTMCNC: 32.2 G/DL (ref 33.7–35.3)
MCV RBC AUTO: 96.3 FL (ref 81.4–97.8)
MONOCYTES # BLD AUTO: 1.32 K/UL (ref 0–0.85)
MONOCYTES NFR BLD AUTO: 11.2 % (ref 0–13.4)
NEUTROPHILS # BLD AUTO: 8.64 K/UL (ref 1.82–7.42)
NEUTROPHILS NFR BLD: 73.5 % (ref 44–72)
NRBC # BLD AUTO: 0 K/UL
NRBC BLD-RTO: 0 /100 WBC
PLATELET # BLD AUTO: 273 K/UL (ref 164–446)
PMV BLD AUTO: 9.3 FL (ref 9–12.9)
POTASSIUM SERPL-SCNC: 4.5 MMOL/L (ref 3.6–5.5)
PROCALCITONIN SERPL-MCNC: <0.05 NG/ML
RBC # BLD AUTO: 4.06 M/UL (ref 4.7–6.1)
SODIUM SERPL-SCNC: 133 MMOL/L (ref 135–145)
WBC # BLD AUTO: 11.8 K/UL (ref 4.8–10.8)

## 2021-03-18 PROCEDURE — 94640 AIRWAY INHALATION TREATMENT: CPT

## 2021-03-18 PROCEDURE — 700102 HCHG RX REV CODE 250 W/ 637 OVERRIDE(OP): Performed by: INTERNAL MEDICINE

## 2021-03-18 PROCEDURE — 99232 SBSQ HOSP IP/OBS MODERATE 35: CPT | Performed by: INTERNAL MEDICINE

## 2021-03-18 PROCEDURE — 700111 HCHG RX REV CODE 636 W/ 250 OVERRIDE (IP): Performed by: INTERNAL MEDICINE

## 2021-03-18 PROCEDURE — 700102 HCHG RX REV CODE 250 W/ 637 OVERRIDE(OP): Performed by: SURGERY

## 2021-03-18 PROCEDURE — 94760 N-INVAS EAR/PLS OXIMETRY 1: CPT

## 2021-03-18 PROCEDURE — 86140 C-REACTIVE PROTEIN: CPT

## 2021-03-18 PROCEDURE — A9270 NON-COVERED ITEM OR SERVICE: HCPCS | Performed by: SURGERY

## 2021-03-18 PROCEDURE — 770006 HCHG ROOM/CARE - MED/SURG/GYN SEMI*

## 2021-03-18 PROCEDURE — 700105 HCHG RX REV CODE 258: Performed by: INTERNAL MEDICINE

## 2021-03-18 PROCEDURE — 80048 BASIC METABOLIC PNL TOTAL CA: CPT

## 2021-03-18 PROCEDURE — 85025 COMPLETE CBC W/AUTO DIFF WBC: CPT

## 2021-03-18 PROCEDURE — A9270 NON-COVERED ITEM OR SERVICE: HCPCS | Performed by: INTERNAL MEDICINE

## 2021-03-18 PROCEDURE — 36415 COLL VENOUS BLD VENIPUNCTURE: CPT

## 2021-03-18 RX ORDER — GABAPENTIN 300 MG/1
300 CAPSULE ORAL 3 TIMES DAILY
Status: DISCONTINUED | OUTPATIENT
Start: 2021-03-18 | End: 2021-03-20 | Stop reason: HOSPADM

## 2021-03-18 RX ADMIN — SODIUM CHLORIDE 3 G: 900 INJECTION INTRAVENOUS at 22:00

## 2021-03-18 RX ADMIN — DOCUSATE SODIUM 50 MG AND SENNOSIDES 8.6 MG 2 TABLET: 8.6; 5 TABLET, FILM COATED ORAL at 17:32

## 2021-03-18 RX ADMIN — TIOTROPIUM BROMIDE INHALATION SPRAY 5 MCG: 3.12 SPRAY, METERED RESPIRATORY (INHALATION) at 10:02

## 2021-03-18 RX ADMIN — SODIUM CHLORIDE 3 G: 900 INJECTION INTRAVENOUS at 09:08

## 2021-03-18 RX ADMIN — OXYCODONE HYDROCHLORIDE 10 MG: 10 TABLET ORAL at 09:16

## 2021-03-18 RX ADMIN — MORPHINE SULFATE 2 MG: 4 INJECTION INTRAVENOUS at 11:24

## 2021-03-18 RX ADMIN — ACETAMINOPHEN 1000 MG: 500 TABLET, FILM COATED ORAL at 05:22

## 2021-03-18 RX ADMIN — GABAPENTIN 300 MG: 300 CAPSULE ORAL at 19:45

## 2021-03-18 RX ADMIN — ATORVASTATIN CALCIUM 40 MG: 40 TABLET, FILM COATED ORAL at 17:32

## 2021-03-18 RX ADMIN — OXYCODONE HYDROCHLORIDE 10 MG: 10 TABLET ORAL at 04:31

## 2021-03-18 RX ADMIN — TRIAMCINOLONE ACETONIDE: 1 OINTMENT TOPICAL at 17:32

## 2021-03-18 RX ADMIN — GABAPENTIN 300 MG: 300 CAPSULE ORAL at 11:23

## 2021-03-18 RX ADMIN — MORPHINE SULFATE 2 MG: 4 INJECTION INTRAVENOUS at 19:46

## 2021-03-18 RX ADMIN — OXYCODONE HYDROCHLORIDE 10 MG: 10 TABLET ORAL at 22:00

## 2021-03-18 RX ADMIN — ACETAMINOPHEN 1000 MG: 500 TABLET, FILM COATED ORAL at 16:01

## 2021-03-18 RX ADMIN — SODIUM CHLORIDE 3 G: 900 INJECTION INTRAVENOUS at 15:27

## 2021-03-18 RX ADMIN — VANCOMYCIN HYDROCHLORIDE 1500 MG: 500 INJECTION, POWDER, LYOPHILIZED, FOR SOLUTION INTRAVENOUS at 05:23

## 2021-03-18 RX ADMIN — ENOXAPARIN SODIUM 40 MG: 40 INJECTION SUBCUTANEOUS at 05:24

## 2021-03-18 RX ADMIN — SODIUM CHLORIDE 3 G: 900 INJECTION INTRAVENOUS at 04:31

## 2021-03-18 RX ADMIN — OXYCODONE HYDROCHLORIDE 10 MG: 10 TABLET ORAL at 01:23

## 2021-03-18 ASSESSMENT — ENCOUNTER SYMPTOMS
MYALGIAS: 1
WEIGHT LOSS: 0
NEUROLOGICAL NEGATIVE: 1
RESPIRATORY NEGATIVE: 1
DIAPHORESIS: 0
EYES NEGATIVE: 1
FEVER: 0
CARDIOVASCULAR NEGATIVE: 1
GASTROINTESTINAL NEGATIVE: 1
PSYCHIATRIC NEGATIVE: 1

## 2021-03-18 ASSESSMENT — PAIN DESCRIPTION - PAIN TYPE
TYPE: ACUTE PAIN

## 2021-03-18 NOTE — DISCHARGE PLANNING
Anticipated Discharge Disposition: Home with home wound vac and home health vs. Out patient wound clinic    Action: RN CM faxed [facility]ending [facility] to [facility] wound vac order form and clinical notes to Destini at Pending sale to [facility].     Home health referrals pending acceptance.     Barriers to Discharge: medical clearance, home health acceptance, home wound vac approval    Plan: Follow up with medical team and DPA

## 2021-03-18 NOTE — PROGRESS NOTES
Assumed care of patient. Report received. Assessment complete.  All questions and concerns addressed.    Pt aox4. No visible signs of distress. VSS.  Pt's pain better managed this shift, medicated per MAR. No complaints of n/v or SOB.  Bruising noted to posterior LLE, left buttocks, and scrotum. Discoloration and coolness noted to L great toe. Pulses heard with doppler on LLE.  Wound vac to foot patent and maintaining suction.

## 2021-03-18 NOTE — PROGRESS NOTES
"Vascular    Events  3/15: RHONDA, pain controlled, was up ambulating today, bypass patent  3/16: RHONDA, pain controlled, was up ambulating today, bypass patent, veraflo vac applied  3/17: Increasing left groin pain and swelling however ultrasound showed no evidence of pseudoaneurysm, there is a lymphocele or hematoma, bypass patent, veraflo vac in place  3/18: RHONDA, left leg feeling a little better    Vitals  /77   Pulse 65   Temp 36.7 °C (98 °F) (Temporal)   Resp 18   Ht 1.956 m (6' 5\")   Wt 94.6 kg (208 lb 8.9 oz)   SpO2 95%   BMI 24.73 kg/m²     Exam  Bypass patent  Bandages CDI  Some erythema and ecchymosis on the left medial upper thigh - unclear cause, stable from yesterday    Labs  WBC 14 -> 13 -> 10 -> 12 today  Hgb 13 stable  Creatinine normal    MRI 3/18 negative for osteo    A/P)  3/13: LLE Fem-TPT bypass with in situ vein  3/14: LLE fem-TPT thrombectomy and revision    Feeling well  Local care for the foot  Continue IV ABx  DC hills    Will need home health for wound care vs SNF/rehab      Brian Cameron MD  [facility] (General and Vascular Surgery)  Cell: [phone] (text or call is fine, if you don't reach me please try my office)  Office: [phone]  __________________________________________________________________  Patient:Antonio Cantu   MRN:[id number]   CSN:[id number]        "

## 2021-03-18 NOTE — PROGRESS NOTES
Lebron catheter removed per order, pt tolerated well, no complications.  DTV [id number].    Tara Thomas R.N.

## 2021-03-18 NOTE — DISCHARGE PLANNING
Agency/Facility Name: [facility]   Spoke To: Intake   Outcome: [facility] is not contracted with Pt's insurance     Agency/Facility Name: [facility]  Referral sent per Choice form @ 1224   DPA included insurance information with referral     -1330  Agency/Facility Name: [facility]  Outcome: Per vmail response, intake is inquiring on insurance info     Agency/Facility Name: [facility]   Spoke To: Rena  Outcome: DPA informed Rena that all that was known about Pt's insurance was faxed with referral.  Rena will have Aurea call this DPA back after 1330 meeting     Agency/Facility Name: [facility]   Spoke To: Aurea  Outcome: Aurea confirmed with this DPA that insurance information was received     -1536  Agency/Facility Name: [facility]  Spoke To: Aurea  Outcome: Per Aurea, Pt's insurance does not cover HH.  If he wanted to pursue that route, Pt would be at a self-pay rate     SHELLEY Tello notified

## 2021-03-18 NOTE — PROGRESS NOTES
[facility] Medicine Daily Progress Note    Date of Service  3/18/2021    Chief Complaint  60 y.o. male admitted 3/12/2021 with left leg pain    Hospital Course:    60-year-old male with a history of BPH, tobacco use, erectile dysfunction, hypertension, dyslipidemia, polycythemia who presented to [facility] on 3/12 with left leg pain and wound where a CTA was performed which showed occlusion of the femoral artery running distally.  He was transferred to [facility].  He was started on heparin drip.  Vascular surgery was consulted.  Dr. Cameron performed left lower extremity bypass on 3/13/2021, however after the procedure at night noted to have bleeding at left groin site, sand bag applied, vitals stable and H/H stable.  During surgery and postop unable to find pulses on doppler, concern for thrombosis. He was taken back to OR on 3/14/21 for thrombectomy and revision.  He then had prevena placed.  Pulses now able to doppler, wound care was consulted and wound VAC was placed, his swelling was not improved well, artery Doppler on 3/17 did not show aneurysm however showed hematoma.     Patient has a deep wound on the left foot, x-ray showed possible osteomyelitis, MRI did not show any signs of osteomyelitis, continue Unasyn for wound infection.    Patient was evaluated by PT and OT and no need for therapy however home health was ordered for wound care since the patient lives by himself.    Patient has history of heavy smoking for years, I had a long discussion with the patient about importance of quitting smoking, and discussed the risk including not limited to worsening peripheral vascular disease, COPD and lung cancer, he understood however he is not willing to quit.        Interval Problem Update  -Evaluated examined the patient at bedside, still having significant pain, increase gabapentin  -MRI did not show osteomyelitis, vancomycin was discontinued  -Mildly worsening leukocytosis however no  fever continue Unasyn  -Waiting for further recommendation from vascular surgery team, clearance before discharge    Consultants/Specialty  Vascular surgeon    Code Status  Full Code    Disposition  Home after vascular surgery clearance.  Patient minute long term of IV antibiotics for possible osteomyelitis.    Review of Systems  Review of Systems   Constitutional: Negative for diaphoresis, fever, malaise/fatigue and weight loss.   HENT: Negative.    Eyes: Negative.    Respiratory: Negative.    Cardiovascular: Negative.    Gastrointestinal: Negative.    Genitourinary: Negative.    Musculoskeletal: Positive for myalgias.        Swelling and redness on the left leg   Skin: Positive for rash.   Neurological: Negative.    Psychiatric/Behavioral: Negative.         Physical Exam  Temp:  [36.7 °C (98 °F)-37.2 °C (98.9 °F)] 36.7 °C (98 °F)  Pulse:  [60-76] 65  Resp:  [17-19] 18  BP: (103-138)/(70-80) 138/77  SpO2:  [94 %-97 %] 95 %    Physical Exam  Constitutional:       General: He is not in acute distress.     Appearance: He is not ill-appearing or diaphoretic.   Cardiovascular:      Rate and Rhythm: Normal rate.      Heart sounds: No murmur.   Pulmonary:      Effort: No respiratory distress.      Breath sounds: Wheezing present. No rales.   Abdominal:      General: Bowel sounds are normal. There is no distension.      Palpations: Abdomen is soft.      Tenderness: There is no abdominal tenderness. There is no guarding.   Musculoskeletal:         General: Swelling, tenderness and signs of injury present.      Cervical back: No rigidity.      Right lower leg: No edema.      Left lower leg: Edema present.   Skin:     General: Skin is warm.      Findings: Erythema and lesion present.      Comments: Wound VAC on the left leg  Swelling on the left leg, worsening today.    Neurological:      General: No focal deficit present.      Mental Status: He is alert and oriented to person, place, and time.      Cranial Nerves: No cranial  nerve deficit.      Motor: No weakness.                 Fluids    Intake/Output Summary (Last 24 hours) at 3/18/2021 1059  Last data filed at 3/18/2021 1031  Gross per 24 hour   Intake 840 ml   Output 6200 ml   Net -5360 ml       Laboratory  Recent Labs     03/16/21  0856 03/17/21  0606 03/18/21  0700   WBC 12.8* 10.5 11.8*   RBC 4.17* 4.07* 4.06*   HEMOGLOBIN 13.1* 12.7* 12.6*   HEMATOCRIT 40.1* 38.8* 39.1*   MCV 96.2 95.3 96.3   MCH 31.4 31.2 31.0   MCHC 32.7* 32.7* 32.2*   RDW 45.8 44.5 45.0   PLATELETCT 276 266 273   MPV 10.0 9.4 9.3     Recent Labs     03/16/21  0222 03/17/21  0606 03/18/21  0700   SODIUM 134* 134* 133*   POTASSIUM 4.2 4.3 4.5   CHLORIDE 98 102 100   CO2 27 24 27   GLUCOSE 119* 96 113*   BUN 8 7* 8   CREATININE 0.76 0.60 0.66   CALCIUM 9.8 9.9 10.0                   Imaging  MR-FOOT-WITH & W/O LEFT   Final Result      There is an ulcer involving the midline plantar aspect of the foot. No extension to the bone.      No MR evidence for osteomyelitis      US-EXTREMITY ARTERY LOWER UNILAT LEFT   Final Result      DX-FOOT-2- LEFT   Final Result      Periarticular erosion involving the medial head of the first metatarsal. This can be seen in the setting of osteomyelitis.      Degenerative changes.      Periarticular osteopenia.      Mild soft tissue swelling.         DX-PORTABLE FLUORO > 1 HOUR   Final Result      Portable fluoroscopy utilized for 44 seconds.         INTERPRETING LOCATION: [address] [place] [address], [facility]      DX-PORTABLE FLUORO > 1 HOUR   Final Result      Digitized intraoperative radiograph is submitted for review.  This examination is not for diagnostic purpose but for guidance during a surgical procedure.         CT-FOREIGN FILM CAT SCAN   Final Result           Assessment/Plan  * Femoral artery occlusion (HCC)  Assessment & Plan  Came with left leg pain and discoloration  CTA showed occlusion on the artery  Bypass was done by vascular surgeon on 3/13  Thrombectomy and revision on  3/14  Continue atorvastatin  S/p conversation about smoking cessation he does appear motivated.  Unasyn for his wound started on 3/16  Artery Doppler on 3/17 did not show aneurysm however showed hematoma.   Pain control with oxycodone and morphine  Increase gabapentin to 300 p.o. 3 times daily for pain management    Penetrating foot wound  Assessment & Plan  On the left foot, deep wound.  No diabetes A1c 6 however he has significant peripheral vascular disease.  X-ray showed erosion involving the medial head of the first metatarsal, possible osteomyelitis  Case was discussed with orthopedic,   Blood cultures negative  Continue Unasyn and add vancomycin  MRI did not show any signs of osteomyelitis, discontinue vancomycin continue Unasyn for 7 days        Leukocytosis  Assessment & Plan  Since admission worsening 14  Improving after starting antibiotics  Patient has a wound on the left foot  Start with Unasyn on 3/16 and added vancomycin on 3/17 for possible osteomyelitis however MRI did not show any signs of osteomyelitis, discontinue vancomycin and continue Unasyn for 7 days.  Blood culture on 3/16 is negative till now  Labs daily    Hyponatremia  Assessment & Plan  Fluid restriction  Labs daily    Essential hypertension- (present on admission)  Assessment & Plan  Continue lisinopril  Monitor and titrate    Normocytic anemia  Assessment & Plan  Likely in setting of surgeries  CTM    Mixed hyperlipidemia- (present on admission)  Assessment & Plan  Will start statin since not currently on one and has severe PAD    Rash of back- (present on admission)  Assessment & Plan  Patient reports history of similar pruritic rash a few years ago on his flanks (now with hypopigmented scars)  Will trial aquaphor and triamcinolone ointment  Sarna prn but I don't think we carry that    Acute respiratory insufficiency  Assessment & Plan  Resolved  After surgery  On NC  IS  Wean as tolerated    Polycythemia- (present on  admission)  Assessment & Plan  Patient has a history of polycythemia, likely related to heavy smoking   however he has never been phlebotomized.  Hemoglobin here is 17 on admission  Now anemic after surgeries    Current every day smoker- (present on admission)  Assessment & Plan  I counseled the patient x10 minutes.  He has been smoking for many years but has been trying to slowly quit.  He is currently down to about 10 cigarettes daily.  He has not had any luck with Chantix, he has tried patches but has not tried gum.  As needed replacement  Continue to support and encourage him.       VTE prophylaxis: Lovenox      Interventions to be considered in all patients in order to minimize the risk of delirium.   -do not disturb patient (vitals or lab draws) between the hours of 10 PM and 6 AM.  -ideally the patient should not sleep during the day and we should avoid day time naps.   -up in chair for meals  -ambulate at least three times daily, as able  -watch for constipation  -timed voiding - ask patient is she would like to go to the bathroom q 2-3 hours, except during the do not disturb hours.   -remove all necessary lines (central lines, peripheral IVs, feeding tubes, hills catheters)  -unless patient has shown harm to self or others I would recommend against use of restraints - either chemical or physical (antipsychotics)   -minimize polypharmacy, do not dose medication during sleep hours

## 2021-03-18 NOTE — PROGRESS NOTES
"Vascular    Events  3/15: RHONDA, pain controlled, was up ambulating today, bypass patent  3/16: RHONDA, pain controlled, was up ambulating today, bypass patent, veraflo vac applied  3/17: Increasing left groin pain and swelling however ultrasound showed no evidence of pseudoaneurysm, there is a lymphocele or hematoma, bypass patent, veraflo vac in place    Vitals  /73   Pulse 60   Temp 36.9 °C (98.4 °F) (Temporal)   Resp 18   Ht 1.956 m (6' 5\")   Wt 94.6 kg (208 lb 8.9 oz)   SpO2 97%   BMI 24.73 kg/m²     Exam  Bypass patent  Bandages CDI    Some erythema and ecchymosis on the medial upper thigh - unclear cause    Labs  WBC 14 -> 13 -> 10 today  Hgb 13 stable  Creatinine normal    A/P)  3/13: LLE Fem-TPT bypass with in situ vein  3/14: LLE fem-TPT thrombectomy and revision    Feeling well  Local care for the foot  Continue IV ABx  MRI pending    Will need home health for wound care vs SNF/rehab      Brian Cameron MD  [facility] (General and Vascular Surgery)  Cell: [phone] (text or call is fine, if you don't reach me please try my office)  Office: [phone]  __________________________________________________________________  Patient:Antonio Cantu   MRN:[id number]   CSN:[id number]        "

## 2021-03-18 NOTE — CARE PLAN
Problem: Communication  Goal: The ability to communicate needs accurately and effectively will improve  Outcome: PROGRESSING AS EXPECTED  Note: Makes needs known.      Problem: Pain Management  Goal: Pain level will decrease to patient's comfort goal  Outcome: PROGRESSING AS EXPECTED  Note: Pain managed w/ PRN and scheduled medication, Gabapentin increased.      Pt alert and oriented x4.  Calm and cooperative.  Prevena and Wound vac intact.  LLE remains slightly edematous and red.  Pulses heard w/ doppler on LLE.  N/T to LLE.  Groin site drsng CDI.  Prevena and Wound Vac intact.  Kenalog applied to rash on back/trunk.  Will continue to monitor.    Blood Pressure: 144/97, Pulse: 87, Respiration: 19, Temperature: 37.6 °C (99.6 °F), Pulse Oximetry: 94 %, O2 (LPM): 0, O2 Delivery Device: None - Room Air    Tara Thomas R.N.

## 2021-03-19 LAB
ANION GAP SERPL CALC-SCNC: 8 MMOL/L (ref 7–16)
BASOPHILS # BLD AUTO: 0.5 % (ref 0–1.8)
BASOPHILS # BLD: 0.06 K/UL (ref 0–0.12)
BUN SERPL-MCNC: 8 MG/DL (ref 8–22)
CALCIUM SERPL-MCNC: 10.1 MG/DL (ref 8.5–10.5)
CHLORIDE SERPL-SCNC: 99 MMOL/L (ref 96–112)
CO2 SERPL-SCNC: 24 MMOL/L (ref 20–33)
CREAT SERPL-MCNC: 0.64 MG/DL (ref 0.5–1.4)
CRP SERPL HS-MCNC: 11.04 MG/DL (ref 0–0.75)
EOSINOPHIL # BLD AUTO: 0.23 K/UL (ref 0–0.51)
EOSINOPHIL NFR BLD: 1.8 % (ref 0–6.9)
ERYTHROCYTE [DISTWIDTH] IN BLOOD BY AUTOMATED COUNT: 43.4 FL (ref 35.9–50)
GLUCOSE SERPL-MCNC: 102 MG/DL (ref 65–99)
HCT VFR BLD AUTO: 37.7 % (ref 42–52)
HGB BLD-MCNC: 12.7 G/DL (ref 14–18)
IMM GRANULOCYTES # BLD AUTO: 0.16 K/UL (ref 0–0.11)
IMM GRANULOCYTES NFR BLD AUTO: 1.3 % (ref 0–0.9)
LYMPHOCYTES # BLD AUTO: 1.58 K/UL (ref 1–4.8)
LYMPHOCYTES NFR BLD: 12.4 % (ref 22–41)
MAGNESIUM SERPL-MCNC: 1.8 MG/DL (ref 1.5–2.5)
MCH RBC QN AUTO: 31.4 PG (ref 27–33)
MCHC RBC AUTO-ENTMCNC: 33.7 G/DL (ref 33.7–35.3)
MCV RBC AUTO: 93.3 FL (ref 81.4–97.8)
MONOCYTES # BLD AUTO: 1.53 K/UL (ref 0–0.85)
MONOCYTES NFR BLD AUTO: 12 % (ref 0–13.4)
NEUTROPHILS # BLD AUTO: 9.23 K/UL (ref 1.82–7.42)
NEUTROPHILS NFR BLD: 72 % (ref 44–72)
NRBC # BLD AUTO: 0 K/UL
NRBC BLD-RTO: 0 /100 WBC
PLATELET # BLD AUTO: 272 K/UL (ref 164–446)
PMV BLD AUTO: 9.3 FL (ref 9–12.9)
POTASSIUM SERPL-SCNC: 4 MMOL/L (ref 3.6–5.5)
PROCALCITONIN SERPL-MCNC: <0.05 NG/ML
RBC # BLD AUTO: 4.04 M/UL (ref 4.7–6.1)
SODIUM SERPL-SCNC: 131 MMOL/L (ref 135–145)
WBC # BLD AUTO: 12.8 K/UL (ref 4.8–10.8)

## 2021-03-19 PROCEDURE — 700111 HCHG RX REV CODE 636 W/ 250 OVERRIDE (IP): Performed by: INTERNAL MEDICINE

## 2021-03-19 PROCEDURE — 700102 HCHG RX REV CODE 250 W/ 637 OVERRIDE(OP): Performed by: INTERNAL MEDICINE

## 2021-03-19 PROCEDURE — 700105 HCHG RX REV CODE 258: Performed by: INTERNAL MEDICINE

## 2021-03-19 PROCEDURE — A9270 NON-COVERED ITEM OR SERVICE: HCPCS | Performed by: INTERNAL MEDICINE

## 2021-03-19 PROCEDURE — 80048 BASIC METABOLIC PNL TOTAL CA: CPT

## 2021-03-19 PROCEDURE — 770006 HCHG ROOM/CARE - MED/SURG/GYN SEMI*

## 2021-03-19 PROCEDURE — A9270 NON-COVERED ITEM OR SERVICE: HCPCS | Performed by: SURGERY

## 2021-03-19 PROCEDURE — 86140 C-REACTIVE PROTEIN: CPT

## 2021-03-19 PROCEDURE — 83735 ASSAY OF MAGNESIUM: CPT

## 2021-03-19 PROCEDURE — 700102 HCHG RX REV CODE 250 W/ 637 OVERRIDE(OP): Performed by: SURGERY

## 2021-03-19 PROCEDURE — 99232 SBSQ HOSP IP/OBS MODERATE 35: CPT | Performed by: INTERNAL MEDICINE

## 2021-03-19 PROCEDURE — 85025 COMPLETE CBC W/AUTO DIFF WBC: CPT

## 2021-03-19 PROCEDURE — 84145 PROCALCITONIN (PCT): CPT

## 2021-03-19 PROCEDURE — 97605 NEG PRS WND THER DME<=50SQCM: CPT

## 2021-03-19 PROCEDURE — 302098 PASTE RING (FLAT): Performed by: INTERNAL MEDICINE

## 2021-03-19 RX ADMIN — ACETAMINOPHEN 1000 MG: 500 TABLET, FILM COATED ORAL at 09:44

## 2021-03-19 RX ADMIN — SODIUM CHLORIDE, POTASSIUM CHLORIDE, SODIUM LACTATE AND CALCIUM CHLORIDE: 600; 310; 30; 20 INJECTION, SOLUTION INTRAVENOUS at 23:24

## 2021-03-19 RX ADMIN — SODIUM CHLORIDE 3 G: 900 INJECTION INTRAVENOUS at 09:26

## 2021-03-19 RX ADMIN — GABAPENTIN 300 MG: 300 CAPSULE ORAL at 05:32

## 2021-03-19 RX ADMIN — OXYCODONE 5 MG: 5 TABLET ORAL at 03:14

## 2021-03-19 RX ADMIN — TRIAMCINOLONE ACETONIDE: 1 OINTMENT TOPICAL at 05:32

## 2021-03-19 RX ADMIN — Medication 1 APPLICATION: at 12:41

## 2021-03-19 RX ADMIN — OXYCODONE HYDROCHLORIDE 10 MG: 10 TABLET ORAL at 17:11

## 2021-03-19 RX ADMIN — GABAPENTIN 300 MG: 300 CAPSULE ORAL at 17:12

## 2021-03-19 RX ADMIN — ATORVASTATIN CALCIUM 40 MG: 40 TABLET, FILM COATED ORAL at 17:11

## 2021-03-19 RX ADMIN — Medication 1 APPLICATION: at 17:13

## 2021-03-19 RX ADMIN — MORPHINE SULFATE 3 MG: 4 INJECTION INTRAVENOUS at 09:27

## 2021-03-19 RX ADMIN — Medication 1 APPLICATION: at 05:32

## 2021-03-19 RX ADMIN — SODIUM CHLORIDE 3 G: 900 INJECTION INTRAVENOUS at 23:29

## 2021-03-19 RX ADMIN — GABAPENTIN 300 MG: 300 CAPSULE ORAL at 12:41

## 2021-03-19 RX ADMIN — ACETAMINOPHEN 1000 MG: 500 TABLET, FILM COATED ORAL at 23:36

## 2021-03-19 RX ADMIN — MORPHINE SULFATE 3 MG: 4 INJECTION INTRAVENOUS at 16:01

## 2021-03-19 RX ADMIN — MORPHINE SULFATE 2 MG: 4 INJECTION INTRAVENOUS at 23:24

## 2021-03-19 RX ADMIN — ENOXAPARIN SODIUM 40 MG: 40 INJECTION SUBCUTANEOUS at 05:32

## 2021-03-19 RX ADMIN — SODIUM CHLORIDE 3 G: 900 INJECTION INTRAVENOUS at 05:31

## 2021-03-19 RX ADMIN — OXYCODONE HYDROCHLORIDE 10 MG: 10 TABLET ORAL at 20:33

## 2021-03-19 RX ADMIN — TRIAMCINOLONE ACETONIDE: 1 OINTMENT TOPICAL at 17:14

## 2021-03-19 RX ADMIN — SODIUM CHLORIDE 3 G: 900 INJECTION INTRAVENOUS at 16:01

## 2021-03-19 RX ADMIN — OXYCODONE HYDROCHLORIDE 10 MG: 10 TABLET ORAL at 12:50

## 2021-03-19 ASSESSMENT — ENCOUNTER SYMPTOMS
PSYCHIATRIC NEGATIVE: 1
MYALGIAS: 1
NEUROLOGICAL NEGATIVE: 1
CARDIOVASCULAR NEGATIVE: 1
WEIGHT LOSS: 0
EYES NEGATIVE: 1
RESPIRATORY NEGATIVE: 1
FEVER: 0
DIAPHORESIS: 0
GASTROINTESTINAL NEGATIVE: 1

## 2021-03-19 ASSESSMENT — PAIN DESCRIPTION - PAIN TYPE: TYPE: ACUTE PAIN

## 2021-03-19 NOTE — CARE PLAN
Problem: Safety  Goal: Will remain free from injury  Outcome: PROGRESSING AS EXPECTED  Goal: Will remain free from falls  Outcome: PROGRESSING AS EXPECTED     Problem: Infection  Goal: Will remain free from infection  Outcome: PROGRESSING AS EXPECTED     Problem: Venous Thromboembolism (VTW)/Deep Vein Thrombosis (DVT) Prevention:  Goal: Patient will participate in Venous Thrombosis (VTE)/Deep Vein Thrombosis (DVT)Prevention Measures  Outcome: PROGRESSING AS EXPECTED     Problem: Bowel/Gastric:  Goal: Normal bowel function is maintained or improved  Outcome: PROGRESSING AS EXPECTED  Goal: Will not experience complications related to bowel motility  Outcome: PROGRESSING AS EXPECTED     Problem: Knowledge Deficit  Goal: Knowledge of disease process/condition, treatment plan, diagnostic tests, and medications will improve  Outcome: PROGRESSING AS EXPECTED  Goal: Knowledge of the prescribed therapeutic regimen will improve  Outcome: PROGRESSING AS EXPECTED     Problem: Discharge Barriers/Planning  Goal: Patient's continuum of care needs will be met  Outcome: PROGRESSING AS EXPECTED     Problem: Pain Management  Goal: Pain level will decrease to patient's comfort goal  Outcome: PROGRESSING AS EXPECTED     Problem: Respiratory:  Goal: Respiratory status will improve  Outcome: PROGRESSING AS EXPECTED     Problem: Urinary Elimination:  Goal: Ability to reestablish a normal urinary elimination pattern will improve  Outcome: PROGRESSING AS EXPECTED     Problem: Fluid Volume:  Goal: Will maintain balanced intake and output  Outcome: PROGRESSING AS EXPECTED     Problem: Mobility  Goal: Risk for activity intolerance will decrease  Outcome: PROGRESSING AS EXPECTED     Problem: Psychosocial Needs:  Goal: Level of anxiety will decrease  Outcome: PROGRESSING AS EXPECTED

## 2021-03-19 NOTE — PROGRESS NOTES
Bedside report received.  Assessment completed.  A&O x 4.   On 0L O2. Denies SOB.  Denies N/V and new numbness or tingling.  Patient ambulates with standby assist. Bed alarm n/a.   Pain managed with prescribed medications.  Tolerating regular diet.  + void, + flatus, last BM regular.  Surgical incisions: prevena to L leg, wound vac to L foot  Pulses intact bilaterally, toes of L great toe are dusky but cap refill <3 seconds    Reviewed plan with of care with patient. Call light and personal belongings with in reach. Hourly rounding in place. All needs met at this time.

## 2021-03-19 NOTE — PROGRESS NOTES
Patient requested bed not be in lowest level/closest to ground. Patient educated that raising the bed would increase his risk of falling and was beneficial to his safety. Patient understood and raised his bed himself. All other safety and fall precautions in place at this time.

## 2021-03-19 NOTE — DISCHARGE PLANNING
Anticipated Discharge Disposition: Home with home wound vac and outpatient wound clinic    Action: RN CM received a call from Destini at [facility]. Per Destini the home wound vac has been delivered and will be covered by insurance.     SHELLEY CHING spoke with [facility] and was informed a new patient appointment would not be available until 3/29. SHELLEY CHING spoke with Sana at [facility] and was informed the first available would be on Monday 3/22. Per Sana the patient can be assisted with applying to patient financial program to receive discounts on appointments if his insurance does not cover outpatient wound care.     RN CM spoke with patient at bedside and received consent to send referral to [facility] for [facility]. Referral sent to [facility] (fax: [phone]).     Patient discussed in IDT rounds. Per MD the patient will likely be medically cleared to discharge on Sunday. RN CM called [facility] and spoke with Ca to schedule an appointment. Per Ca the only appointment available on Monday is at 0745. Per Ca they will review the referral for insurance coverage and speak with the surgeon's office.     Appointment scheduled for 3/22 at 0745. MD updated.     Barriers to Discharge: medical clearance, insurance coverage for outpatient wound clinic visits    Plan: Follow up with medical team

## 2021-03-19 NOTE — PROGRESS NOTES
Hospital Medicine Daily Progress Note    Date of Service  3/19/2021    Chief Complaint  60 y.o. male admitted 3/12/2021 with left leg pain    Hospital Course:    60-year-old male with a history of BPH, tobacco use, erectile dysfunction, hypertension, dyslipidemia, polycythemia who presented to [facility] on 3/12 with left leg pain and wound where a CTA was performed which showed occlusion of the femoral artery running distally.  He was transferred to [facility].  He was started on heparin drip.  Vascular surgery was consulted.  Dr. Cameron performed left lower extremity bypass on 3/13/2021, however after the procedure at night noted to have bleeding at left groin site, sand bag applied, vitals stable and H/H stable.  During surgery and postop unable to find pulses on doppler, concern for thrombosis. He was taken back to OR on 3/14/21 for thrombectomy and revision.  He then had prevena placed.  Pulses now able to doppler, wound care was consulted and wound VAC was placed, his swelling was not improved well, artery Doppler on 3/17 did not show aneurysm however showed hematoma.         Patient has a deep wound on the left foot, x-ray showed possible osteomyelitis, MRI did not show any signs of osteomyelitis, continue Unasyn for wound infection.    Patient was evaluated by PT and OT and no need for therapy, home health was ordered for wound care, however due to shortness of breath, patient preferred to follow-up with wound clinic, next appointment will be 3/22 at 0745.    Patient has history of heavy smoking for years, I had a long discussion with the patient about importance of quitting smoking, and discussed the risk including not limited to worsening peripheral vascular disease, COPD and lung cancer, he understood however he is not willing to quit.    Labs showed A1c 6, encouraged the patient for healthy diet and exercise and no need for medication at this time.        Interval Problem  Update  -Evaluated examined the patient at bedside, patient is medically stable, no fever.  -Some worsening on leukocytosis however patient denied fever and his swelling is improving, continue IV Unasyn.  -Working for placement; SNF/home health/wound clinic, discussed all options with the patient and discussed the insurance barriers, patient was to follow-up with wound clinic.   -Ordered wound VAC for home.   -Discharge possible during this week and after clearance from vascular surgeon.    Consultants/Specialty  Vascular surgeon    Code Status  Full Code    Disposition  Discharge home possible during this weekend after clearance from vascular surgeon.      Review of Systems  Review of Systems   Constitutional: Negative for diaphoresis, fever, malaise/fatigue and weight loss.   HENT: Negative.    Eyes: Negative.    Respiratory: Negative.    Cardiovascular: Negative.    Gastrointestinal: Negative.    Genitourinary: Negative.    Musculoskeletal: Positive for myalgias.        Swelling and redness on the left leg   Skin: Positive for rash.   Neurological: Negative.    Psychiatric/Behavioral: Negative.         Physical Exam  Temp:  [36.6 °C (97.9 °F)-37.6 °C (99.6 °F)] 36.8 °C (98.3 °F)  Pulse:  [71-87] 76  Resp:  [18-19] 18  BP: (117-153)/(80-97) 117/80  SpO2:  [94 %-96 %] 96 %    Physical Exam  Constitutional:       General: He is not in acute distress.     Appearance: He is not ill-appearing or diaphoretic.   Cardiovascular:      Rate and Rhythm: Normal rate.      Heart sounds: No murmur.   Pulmonary:      Effort: No respiratory distress.      Breath sounds: Wheezing present. No rales.   Abdominal:      General: Bowel sounds are normal. There is no distension.      Palpations: Abdomen is soft.      Tenderness: There is no abdominal tenderness. There is no guarding.   Musculoskeletal:         General: Swelling, tenderness and signs of injury present.      Cervical back: No rigidity.      Right lower leg: No edema.       Left lower leg: Edema present.   Skin:     General: Skin is warm.      Findings: Erythema and lesion present.      Comments: Wound VAC on the left leg  Swelling on the left leg, worsening today.    Neurological:      General: No focal deficit present.      Mental Status: He is alert and oriented to person, place, and time.      Cranial Nerves: No cranial nerve deficit.      Motor: No weakness.                 Fluids    Intake/Output Summary (Last 24 hours) at 3/19/2021 1341  Last data filed at 3/19/2021 1130  Gross per 24 hour   Intake 650 ml   Output 4150 ml   Net -3500 ml       Laboratory  Recent Labs     03/17/21  0606 03/18/21  0700 03/19/21  0634   WBC 10.5 11.8* 12.8*   RBC 4.07* 4.06* 4.04*   HEMOGLOBIN 12.7* 12.6* 12.7*   HEMATOCRIT 38.8* 39.1* 37.7*   MCV 95.3 96.3 93.3   MCH 31.2 31.0 31.4   MCHC 32.7* 32.2* 33.7   RDW 44.5 45.0 43.4   PLATELETCT 266 273 272   MPV 9.4 9.3 9.3     Recent Labs     03/17/21  0606 03/18/21  0700 03/19/21  0634   SODIUM 134* 133* 131*   POTASSIUM 4.3 4.5 4.0   CHLORIDE 102 100 99   CO2 24 27 24   GLUCOSE 96 113* 102*   BUN 7* 8 8   CREATININE 0.60 0.66 0.64   CALCIUM 9.9 10.0 10.1                   Imaging  MR-FOOT-WITH & W/O LEFT   Final Result      There is an ulcer involving the midline plantar aspect of the foot. No extension to the bone.      No MR evidence for osteomyelitis      US-EXTREMITY ARTERY LOWER UNILAT LEFT   Final Result      DX-FOOT-2- LEFT   Final Result      Periarticular erosion involving the medial head of the first metatarsal. This can be seen in the setting of osteomyelitis.      Degenerative changes.      Periarticular osteopenia.      Mild soft tissue swelling.         DX-PORTABLE FLUORO > 1 HOUR   Final Result      Portable fluoroscopy utilized for 44 seconds.         INTERPRETING LOCATION: [address] [place] [address], [id number]      DX-PORTABLE FLUORO > 1 HOUR   Final Result      Digitized intraoperative radiograph is submitted for review.  This examination is  not for diagnostic purpose but for guidance during a surgical procedure.         CT-FOREIGN FILM CAT SCAN   Final Result           Assessment/Plan  * Femoral artery occlusion (HCC)  Assessment & Plan  Came with left leg pain and discoloration  CTA showed occlusion on the artery  Bypass was done by vascular surgeon on 3/13  Thrombectomy and revision on 3/14  Continue atorvastatin  S/p conversation about smoking cessation he does appear motivated.  Unasyn for his wound started on 3/16  Artery Doppler on 3/17 did not show aneurysm however showed hematoma.   Pain control with oxycodone and morphine  Increase gabapentin to 300 p.o. 3 times daily for pain management    Penetrating foot wound  Assessment & Plan  On the left foot, deep wound.  No diabetes A1c 6 however he has significant peripheral vascular disease.  X-ray showed erosion involving the medial head of the first metatarsal, possible osteomyelitis  Case was discussed with orthopedic,   Blood cultures negative  Continue Unasyn and add vancomycin  MRI did not show any signs of osteomyelitis, discontinue vancomycin continue Unasyn for 7 days        Leukocytosis  Assessment & Plan  Since admission worsening 14  Improving after starting antibiotics  Patient has a wound on the left foot  Start with Unasyn on 3/16 and added vancomycin on 3/17 for possible osteomyelitis however MRI did not show any signs of osteomyelitis, discontinue vancomycin and continue Unasyn for 7 days.  Blood culture on 3/16 is negative till now  Labs daily    Hyponatremia  Assessment & Plan  Fluid restriction  Labs daily    Essential hypertension- (present on admission)  Assessment & Plan  Continue lisinopril  Monitor and titrate    Normocytic anemia  Assessment & Plan  Likely in setting of surgeries  CTM    Mixed hyperlipidemia- (present on admission)  Assessment & Plan  Will start statin since not currently on one and has severe PAD    Rash of back- (present on admission)  Assessment &  Plan  Patient reports history of similar pruritic rash a few years ago on his flanks (now with hypopigmented scars)  Will trial aquaphor and triamcinolone ointment  Sarna prn but I don't think we carry that    Acute respiratory insufficiency  Assessment & Plan  Resolved  After surgery  On NC  IS  Wean as tolerated    Polycythemia- (present on admission)  Assessment & Plan  Patient has a history of polycythemia, likely related to heavy smoking   however he has never been phlebotomized.  Hemoglobin here is 17 on admission  Now anemic after surgeries    Current every day smoker- (present on admission)  Assessment & Plan  I counseled the patient x10 minutes.  He has been smoking for many years but has been trying to slowly quit.  He is currently down to about 10 cigarettes daily.  He has not had any luck with Chantix, he has tried patches but has not tried gum.  As needed replacement  Continue to support and encourage him.       VTE prophylaxis: Lovenox      Interventions to be considered in all patients in order to minimize the risk of delirium.   -do not disturb patient (vitals or lab draws) between the hours of 10 PM and 6 AM.  -ideally the patient should not sleep during the day and we should avoid day time naps.   -up in chair for meals  -ambulate at least three times daily, as able  -watch for constipation  -timed voiding - ask patient is she would like to go to the bathroom q 2-3 hours, except during the do not disturb hours.   -remove all necessary lines (central lines, peripheral IVs, feeding tubes, hills catheters)  -unless patient has shown harm to self or others I would recommend against use of restraints - either chemical or physical (antipsychotics)   -minimize polypharmacy, do not dose medication during sleep hours

## 2021-03-19 NOTE — PROGRESS NOTES
"Vascular    Events  3/15: RHONDA, pain controlled, was up ambulating today, bypass patent  3/16: RHONDA, pain controlled, was up ambulating today, bypass patent, veraflo vac applied  3/17: Increasing left groin pain and swelling however ultrasound showed no evidence of pseudoaneurysm, there is a lymphocele or hematoma, bypass patent, veraflo vac in place  3/18: RHONDA, left leg feeling a little better  3/19: RHONDA, left leg feeling a little better, Prevena removed and incision healing well    Vitals  /80   Pulse 76   Temp 36.8 °C (98.3 °F) (Temporal)   Resp 18   Ht 1.956 m (6' 5\")   Wt 94.6 kg (208 lb 8.9 oz)   SpO2 96%   BMI 24.73 kg/m²     Exam  Bypass patent  Prevena removed, incisions healing well, bandages applied  Some erythema and ecchymosis on the left medial upper thigh - unclear cause, improved from yesterday  Vera flow wound VAC on the left foot    Labs  WBC 14 -> 13 -> 10 -> 12 -> 13 today  Hgb 13 stable  Creatinine normal    MRI 3/18 negative for osteo    A/P)  3/13: LLE Fem-TPT bypass with in situ vein  3/14: LLE fem-TPT thrombectomy and revision    Feeling well  Local care for the foot  Continue empiric IV ABx due to erythema of the left thigh and also infected left foot ulcer  Aspirin 81 mg daily indefinitely    Will need home health for wound care vs SNF/rehab    Brian Cameron MD  [facility] (General and Vascular Surgery)  Cell: [phone] (text or call is fine, if you don't reach me please try my office)  Office: [phone]  __________________________________________________________________  Patient:Antonio Cantu   MRN:[id number]   CSN:[id number]        "

## 2021-03-19 NOTE — CARE PLAN
Problem: Safety  Goal: Will remain free from falls  Outcome: PROGRESSING AS EXPECTED     Problem: Bowel/Gastric:  Goal: Normal bowel function is maintained or improved  Outcome: PROGRESSING AS EXPECTED     Problem: Pain Management  Goal: Pain level will decrease to patient's comfort goal  Outcome: PROGRESSING AS EXPECTED

## 2021-03-19 NOTE — WOUND TEAM
[facility]  Inpatient Services  Wound and Skin Care Progress note    Admission Date: 3/12/2021     Last order of IP CONSULT TO WOUND CARE was found on 3/12/2021 from Hospital Encounter on 3/12/2021     HPI, PMH, SH: Reviewed    Past Surgical History:   Procedure Laterality Date   • FEMORAL POPLITEAL BYPASS Left 3/14/2021    Procedure: Revision Left Femoral to Popliteal Bypass;  Surgeon: Brian Cameron M.D.;  Location: SURGERY [facility];  Service: Vascular   • ANGIOGRAM Left 3/14/2021    Procedure: ANGIOGRAM;  Surgeon: Brian Cameron M.D.;  Location: SURGERY [facility];  Service: Vascular   • THROMBECTOMY Left 3/14/2021    Procedure: THROMBECTOMY;  Surgeon: Brian Cameron M.D.;  Location: SURGERY [facility];  Service: Vascular   • FEMORAL POPLITEAL BYPASS Left 3/13/2021    Procedure: CREATION, BYPASS, ARTERIAL, FEMORAL TO POPLITEAL, USING GRAFT;  Surgeon: Brian Cameron M.D.;  Location: SURGERY [facility];  Service: Vascular   • ANGIOGRAM Left 3/13/2021    Procedure: ANGIOGRAM;  Surgeon: Brian Cameron M.D.;  Location: SURGERY [facility];  Service: Vascular   • IRRIGATION & DEBRIDEMENT GENERAL Left 3/13/2021    Procedure: IRRIGATION AND DEBRIDEMENT, WOUND;  Surgeon: Brian Cameron M.D.;  Location: SURGERY [facility];  Service: Vascular   • HAMMERTOE CORRECTION       Social History     Tobacco Use   • Smoking status: Current Every Day Smoker     Packs/day: 1.00     Years: 45.00     Pack years: 45.00     Types: Cigarettes   • Smokeless tobacco: Never Used   • Tobacco comment: 20 cig/day   Substance Use Topics   • Alcohol use: Yes     Alcohol/week: 3.0 - 3.6 oz     Types: 5 - 6 Cans of beer per week     Comment: 1-6 beers/day      Chief Complaint   Patient presents with   • Leg Swelling     Transfer from [facility] for L LE clot.  Leg is discolored and swollen x 1 week, pain with palpation.  U/S showed L arterial occlusion Arrived on heparin gtt.   • Open Wound     L lateral  "foot 1x1 wound, macerated edges.     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 Pt has a known wound to the left plantar foot as well. Per pt he was having wound treated by a podiatrist who removed what patient referred to as a \"Painful callused volcano\" with hair growing from it. Wound was initially debrided by podiatrist and pt reports there was then a cratered wound which he was caring for at home. Wound was debrided in OR during re-vascularization and was covered with betadine soaked gauze.     WOUND ASSESSMENT/LDA     Negative Pressure Wound Therapy 03/16/21 Surgical Foot Plantar Left (Active)   Vacuum Serial Number [id number] 03/16/21 1200   Dressing Type Small;Black Foam (Veraflo) 03/19/21 0800   Number of Foam Pieces Used 1 03/16/21 1200   Canister Changed No 03/18/21 2000   Output (mL) 50 mL 03/19/21 1603   NEXT Dressing Change/Treatment Date 03/19/21 03/18/21 2000   VAC VeraFlo Irrigant Normal Saline 03/18/21 2000   VAC VeraFlo Soak Time (mins) 6 03/16/21 1200   VAC VeraFlo Instill Volume (ml) 16 03/16/21 1200   VAC VeraFlo - Therapy Time (hrs) 3.5 03/16/21 1200   VAC VeraFlo Pressure (mm/Hg) Intermittent;125 mmHg 03/17/21 2020           Wound 03/13/21 Full Thickness Wound Foot Distal;Plantar Left (Active)   Wound Image    03/14/21 1100   Site Assessment Red;Yellow 03/19/21 1700   Periwound Assessment Maceration 03/19/21 1700   Margins ANA 03/19/21 0800   Closure Other (Comment) 03/19/21 0800   Drainage Amount Small 03/19/21 1700   Drainage Description Serosanguineous 03/19/21 1700   Treatments Cleansed;Site care;Offloading 03/16/21 1200   Wound Cleansing Approved Wound Cleanser 03/16/21 1200   Periwound Protectant Skin Protectant Wipes to Periwound;Paste Ring;Drape 03/16/21 1200   Dressing " Cleansing/Solutions Normal Saline 03/16/21 1200   Dressing Options Wound Vac 03/19/21 1700   Dressing Changed Observed 03/17/21 2020   Dressing Status Clean;Dry;Intact 03/19/21 0800   Dressing Change/Treatment Frequency Monday, Wednesday, Friday, and As Needed 03/19/21 1700   NEXT Dressing Change/Treatment Date 03/22/21 03/19/21 1700   NEXT Weekly Photo (Inpatient Only) 03/22/21 03/19/21 1700   Non-staged Wound Description Full thickness 03/19/21 1700   Wound Length (cm) 2.2 cm 03/14/21 1100   Wound Width (cm) 1.5 cm 03/14/21 1100   Wound Depth (cm) 1.2 cm 03/14/21 1100   Wound Surface Area (cm^2) 3.3 cm^2 03/14/21 1100   Wound Volume (cm^3) 3.96 cm^3 03/14/21 1100   Shape Irregular Oval 03/16/21 1200   Wound Odor None 03/19/21 1700   Pulses Left;DP;2+ 03/16/21 1200   Exposed Structures None 03/19/21 1700   WOUND NURSE ONLY - Time Spent with Patient (mins) 60 03/19/21 1700            Vascular:     CARLA:   No results found.    Lab Values:    Lab Results   Component Value Date/Time    WBC 12.8 (H) 03/19/2021 06:34 AM    RBC 4.04 (L) 03/19/2021 06:34 AM    HEMOGLOBIN 12.7 (L) 03/19/2021 06:34 AM    HEMATOCRIT 37.7 (L) 03/19/2021 06:34 AM    CREACTPROT 11.04 (H) 03/19/2021 06:34 AM    HBA1C 6.0 (H) 03/17/2021 06:06 AM      Culture Results show:  No results found for this or any previous visit (from the past 720 hour(s)).    Pain Level/Medicated:  Pre medicated by bedside RN, iv morphine - did not use lidocaine but suggest for future changes    INTERVENTIONS BY WOUND TEAM:  Performed standard wound care which includes appropriate positioning, dressing removal and non-selective debridement. Pictures and measurements obtained weekly if/when required.  Preparation for Dressing removal: soaked  Cleansed with:  Wound cleanser and gauze.   Sharp debridement: scissors and forceps < 20 cm2 yellow slough and venkata macerated callus  Venkata wound: Cleansed with wound cleanser and gauze, Prepped with No sting skin barrier One piece1  paste ring around the wound; 1 piece of drape continued along the lateral foot to dorsal side as bridge for trac pad.   Primary Dressing: One continuous piece of black veraflo foam applied into wound bed and continued up along the lateral apsect of the foot.  Secondary (Outer) Dressing: Secured with drape and trac pad.   Interdisciplinary consultation: Patient, Bedside RN    EVALUATION / RATIONALE FOR TREATMENT:  Most Recent Date:  3/19/21 wound bed not yet granulating vigorously   3/16/21: NPWT veraflo applied to plantar foot to cleanse wound and assist in granular tissue development.     3/14/21: Pt underwent re-vascularization and I&D of left plantar foot wound yesterday. Unable to palpate or doppler pulses today. Waiting on Dr. Cameron to make recommendations. Hydrogel gauze applied to provide for a moist wound bed. Pt may benefit from NPWT application. Foot is extremely painful to touch.     Goals: Steady decrease in wound area and depth weekly.    WOUND TEAM PLAN OF CARE ([X] for frequency of wound follow up,):   Nursing to follow orders written for wound care. Contact wound team if area fails to progress, deteriorates or with any questions/concerns  Dressing changes by wound team:                   Follow up 3 times weekly:   X             NPWT change 3 times weekly:     Follow up 1-2 times weekly:      Follow up Bi-Monthly:                   Follow up as needed:     Other (explain):     NURSING PLAN OF CARE ORDERS (X):  Dressing changes: See Dressing Care orders: X  Skin care: See Skin Care orders:   RN Prevention Protocol: X  Rectal tube care: See Rectal Tube Care orders:   Other orders:    RSKIN:   CURRENTLY IN PLACE (X), APPLIED THIS VISIT (A), ORDERED (O):   Q shift Cody:  X  Q shift pressure point assessments:  X    Surface/Positioning   Pressure redistribution mattress   X         Low Airloss          Bariatric foam      Bariatric IVIS     Waffle cushion        Waffle Overlay   O       Reposition q 2  hours      TAPs Turning system     Z Toni Pillow     Offloading/Redistribution   Sacral Mepilex (Silicone dressing)   ANA  Heel Mepilex (Silicone dressing)    O     Heel float boots (Prevalon boot)             Float Heels off Bed with Pillows           Respiratory NA room air  Silicone O2 tubing         Gray Foam Ear protectors     Cannula fixation Device (Tender )          High flow offloading Clip    Elastic head band offloading device      Anchorfast                                                         Trach with Optifoam split foam             Containment/Moisture Prevention NA continent    Rectal tube or BMS    Purwick/Condom Cath        Lebron Catheter    Barrier wipes           Barrier paste       Antifungal tx      Interdry        Mobilization       Up to chair        Ambulate   X   PT/OT      Nutrition      Dietician        Diabetes Education      PO   X   TF     TPN     NPO   # days     Other        Anticipated discharge plans:   LTACH:        SNF/Rehab:                  Home Health Care:           Outpatient Wound Center:   X       Pt will require ongoing VAC changes for L foot  Self/Family Care:        Other:

## 2021-03-20 ENCOUNTER — PHARMACY VISIT (OUTPATIENT)
Dept: PHARMACY | Facility: MEDICAL CENTER | Age: 61
End: 2021-03-20
Payer: COMMERCIAL

## 2021-03-20 VITALS
BODY MASS INDEX: 24.78 KG/M2 | RESPIRATION RATE: 18 BRPM | SYSTOLIC BLOOD PRESSURE: 117 MMHG | HEART RATE: 77 BPM | OXYGEN SATURATION: 98 % | HEIGHT: 77 IN | DIASTOLIC BLOOD PRESSURE: 78 MMHG | WEIGHT: 209.88 LBS | TEMPERATURE: 97.8 F

## 2021-03-20 PROBLEM — D72.829 LEUKOCYTOSIS: Status: RESOLVED | Noted: 2021-03-16 | Resolved: 2021-03-20

## 2021-03-20 PROBLEM — I70.209 FEMORAL ARTERY OCCLUSION (HCC): Status: RESOLVED | Noted: 2021-03-12 | Resolved: 2021-03-20

## 2021-03-20 PROBLEM — R06.89 ACUTE RESPIRATORY INSUFFICIENCY: Status: RESOLVED | Noted: 2021-03-14 | Resolved: 2021-03-20

## 2021-03-20 PROBLEM — R21 RASH OF BACK: Status: RESOLVED | Noted: 2021-03-15 | Resolved: 2021-03-20

## 2021-03-20 LAB
ALBUMIN SERPL BCP-MCNC: 3.4 G/DL (ref 3.2–4.9)
ALBUMIN/GLOB SERPL: 1 G/DL
ALP SERPL-CCNC: 74 U/L (ref 30–99)
ALT SERPL-CCNC: 18 U/L (ref 2–50)
ANION GAP SERPL CALC-SCNC: 9 MMOL/L (ref 7–16)
AST SERPL-CCNC: 16 U/L (ref 12–45)
BASOPHILS # BLD AUTO: 0.4 % (ref 0–1.8)
BASOPHILS # BLD: 0.05 K/UL (ref 0–0.12)
BILIRUB SERPL-MCNC: 0.7 MG/DL (ref 0.1–1.5)
BUN SERPL-MCNC: 9 MG/DL (ref 8–22)
CALCIUM SERPL-MCNC: 9.8 MG/DL (ref 8.5–10.5)
CHLORIDE SERPL-SCNC: 93 MMOL/L (ref 96–112)
CO2 SERPL-SCNC: 25 MMOL/L (ref 20–33)
CREAT SERPL-MCNC: 0.66 MG/DL (ref 0.5–1.4)
CRP SERPL HS-MCNC: 9.41 MG/DL (ref 0–0.75)
EOSINOPHIL # BLD AUTO: 0.31 K/UL (ref 0–0.51)
EOSINOPHIL NFR BLD: 2.7 % (ref 0–6.9)
ERYTHROCYTE [DISTWIDTH] IN BLOOD BY AUTOMATED COUNT: 44.5 FL (ref 35.9–50)
GLOBULIN SER CALC-MCNC: 3.4 G/DL (ref 1.9–3.5)
GLUCOSE SERPL-MCNC: 104 MG/DL (ref 65–99)
HCT VFR BLD AUTO: 39.4 % (ref 42–52)
HGB BLD-MCNC: 12.9 G/DL (ref 14–18)
IMM GRANULOCYTES # BLD AUTO: 0.18 K/UL (ref 0–0.11)
IMM GRANULOCYTES NFR BLD AUTO: 1.6 % (ref 0–0.9)
LYMPHOCYTES # BLD AUTO: 1.75 K/UL (ref 1–4.8)
LYMPHOCYTES NFR BLD: 15.2 % (ref 22–41)
MAGNESIUM SERPL-MCNC: 1.9 MG/DL (ref 1.5–2.5)
MCH RBC QN AUTO: 31.3 PG (ref 27–33)
MCHC RBC AUTO-ENTMCNC: 32.7 G/DL (ref 33.7–35.3)
MCV RBC AUTO: 95.6 FL (ref 81.4–97.8)
MONOCYTES # BLD AUTO: 1.33 K/UL (ref 0–0.85)
MONOCYTES NFR BLD AUTO: 11.5 % (ref 0–13.4)
NEUTROPHILS # BLD AUTO: 7.92 K/UL (ref 1.82–7.42)
NEUTROPHILS NFR BLD: 68.6 % (ref 44–72)
NRBC # BLD AUTO: 0 K/UL
NRBC BLD-RTO: 0 /100 WBC
OSMOLALITY SERPL: 275 MOSM/KG H2O (ref 278–298)
PLATELET # BLD AUTO: 299 K/UL (ref 164–446)
PMV BLD AUTO: 9.1 FL (ref 9–12.9)
POTASSIUM SERPL-SCNC: 3.5 MMOL/L (ref 3.6–5.5)
PROT SERPL-MCNC: 6.8 G/DL (ref 6–8.2)
RBC # BLD AUTO: 4.12 M/UL (ref 4.7–6.1)
SODIUM SERPL-SCNC: 127 MMOL/L (ref 135–145)
SODIUM SERPL-SCNC: 132 MMOL/L (ref 135–145)
WBC # BLD AUTO: 11.5 K/UL (ref 4.8–10.8)

## 2021-03-20 PROCEDURE — RXMED WILLOW AMBULATORY MEDICATION CHARGE: Performed by: SURGERY

## 2021-03-20 PROCEDURE — RXMED WILLOW AMBULATORY MEDICATION CHARGE: Performed by: INTERNAL MEDICINE

## 2021-03-20 PROCEDURE — A9270 NON-COVERED ITEM OR SERVICE: HCPCS | Performed by: SURGERY

## 2021-03-20 PROCEDURE — 83930 ASSAY OF BLOOD OSMOLALITY: CPT

## 2021-03-20 PROCEDURE — 84295 ASSAY OF SERUM SODIUM: CPT

## 2021-03-20 PROCEDURE — 700102 HCHG RX REV CODE 250 W/ 637 OVERRIDE(OP): Performed by: SURGERY

## 2021-03-20 PROCEDURE — 83735 ASSAY OF MAGNESIUM: CPT

## 2021-03-20 PROCEDURE — 700111 HCHG RX REV CODE 636 W/ 250 OVERRIDE (IP): Performed by: INTERNAL MEDICINE

## 2021-03-20 PROCEDURE — 700105 HCHG RX REV CODE 258: Performed by: INTERNAL MEDICINE

## 2021-03-20 PROCEDURE — 700102 HCHG RX REV CODE 250 W/ 637 OVERRIDE(OP): Performed by: INTERNAL MEDICINE

## 2021-03-20 PROCEDURE — A9270 NON-COVERED ITEM OR SERVICE: HCPCS | Performed by: INTERNAL MEDICINE

## 2021-03-20 PROCEDURE — 99239 HOSP IP/OBS DSCHRG MGMT >30: CPT | Performed by: INTERNAL MEDICINE

## 2021-03-20 PROCEDURE — 80053 COMPREHEN METABOLIC PANEL: CPT

## 2021-03-20 PROCEDURE — 86140 C-REACTIVE PROTEIN: CPT

## 2021-03-20 PROCEDURE — 85025 COMPLETE CBC W/AUTO DIFF WBC: CPT

## 2021-03-20 RX ORDER — SODIUM CHLORIDE 1 G/1
1 TABLET ORAL 2 TIMES DAILY WITH MEALS
Status: DISCONTINUED | OUTPATIENT
Start: 2021-03-20 | End: 2021-03-20 | Stop reason: HOSPADM

## 2021-03-20 RX ORDER — AMOXICILLIN AND CLAVULANATE POTASSIUM 875; 125 MG/1; MG/1
1 TABLET, FILM COATED ORAL 2 TIMES DAILY
Qty: 6 TABLET | Refills: 0 | Status: SHIPPED | OUTPATIENT
Start: 2021-03-20 | End: 2021-03-23

## 2021-03-20 RX ORDER — OXYCODONE HYDROCHLORIDE AND ACETAMINOPHEN 5; 325 MG/1; MG/1
1-2 TABLET ORAL EVERY 6 HOURS PRN
Qty: 40 TABLET | Refills: 0 | Status: SHIPPED | OUTPATIENT
Start: 2021-03-20 | End: 2021-03-25

## 2021-03-20 RX ORDER — POTASSIUM CHLORIDE 20 MEQ/1
40 TABLET, EXTENDED RELEASE ORAL ONCE
Status: COMPLETED | OUTPATIENT
Start: 2021-03-20 | End: 2021-03-20

## 2021-03-20 RX ORDER — GABAPENTIN 300 MG/1
300 CAPSULE ORAL 2 TIMES DAILY
Qty: 180 CAPSULE | Refills: 3 | Status: SHIPPED | OUTPATIENT
Start: 2021-03-20 | End: 2021-09-15 | Stop reason: SDUPTHER

## 2021-03-20 RX ORDER — GABAPENTIN 300 MG/1
300 CAPSULE ORAL 2 TIMES DAILY
Qty: 180 CAPSULE | Refills: 3 | Status: SHIPPED | OUTPATIENT
Start: 2021-03-20 | End: 2021-03-20

## 2021-03-20 RX ORDER — ASPIRIN 81 MG/1
81 TABLET, CHEWABLE ORAL DAILY
Status: DISCONTINUED | OUTPATIENT
Start: 2021-03-20 | End: 2021-03-20 | Stop reason: HOSPADM

## 2021-03-20 RX ORDER — SULFAMETHOXAZOLE AND TRIMETHOPRIM 800; 160 MG/1; MG/1
1 TABLET ORAL 2 TIMES DAILY
Qty: 42 TABLET | Refills: 3 | Status: SHIPPED | OUTPATIENT
Start: 2021-03-20 | End: 2021-09-15

## 2021-03-20 RX ADMIN — POTASSIUM CHLORIDE 40 MEQ: 1500 TABLET, EXTENDED RELEASE ORAL at 08:19

## 2021-03-20 RX ADMIN — Medication 1 APPLICATION: at 11:28

## 2021-03-20 RX ADMIN — ASPIRIN 81 MG: 81 TABLET, CHEWABLE ORAL at 09:58

## 2021-03-20 RX ADMIN — OXYCODONE HYDROCHLORIDE 10 MG: 10 TABLET ORAL at 14:50

## 2021-03-20 RX ADMIN — OXYCODONE HYDROCHLORIDE 10 MG: 10 TABLET ORAL at 08:19

## 2021-03-20 RX ADMIN — SODIUM CHLORIDE 3 G: 900 INJECTION INTRAVENOUS at 09:58

## 2021-03-20 RX ADMIN — Medication 1 G: at 08:19

## 2021-03-20 RX ADMIN — OXYCODONE HYDROCHLORIDE 10 MG: 10 TABLET ORAL at 04:49

## 2021-03-20 RX ADMIN — ENOXAPARIN SODIUM 40 MG: 40 INJECTION SUBCUTANEOUS at 04:49

## 2021-03-20 RX ADMIN — OXYCODONE HYDROCHLORIDE 10 MG: 10 TABLET ORAL at 11:28

## 2021-03-20 RX ADMIN — GABAPENTIN 300 MG: 300 CAPSULE ORAL at 11:28

## 2021-03-20 RX ADMIN — SODIUM CHLORIDE 3 G: 900 INJECTION INTRAVENOUS at 04:54

## 2021-03-20 RX ADMIN — GABAPENTIN 300 MG: 300 CAPSULE ORAL at 04:53

## 2021-03-20 ASSESSMENT — FIBROSIS 4 INDEX: FIB4 SCORE: 0.77

## 2021-03-20 ASSESSMENT — PAIN DESCRIPTION - PAIN TYPE: TYPE: ACUTE PAIN;SURGICAL PAIN

## 2021-03-20 NOTE — DISCHARGE SUMMARY
Discharge Summary    CHIEF COMPLAINT ON ADMISSION  Chief Complaint   Patient presents with   • Leg Swelling     Transfer from [facility] for L LE clot.  Leg is discolored and swollen x 1 week, pain with palpation.  U/S showed L arterial occlusion Arrived on heparin gtt.   • Open Wound     L lateral foot 1x1 wound, macerated edges.       Reason for Admission  Peripheral vascular disease and food wound    Admission Date  3/12/2021    CODE STATUS  Full Code    HPI & HOSPITAL COURSE     60-year-old male with a history of BPH, tobacco use, erectile dysfunction, hypertension, dyslipidemia, polycythemia who presented to [facility] on 3/12 with left leg pain and wound where a CTA was performed which showed occlusion of the femoral artery running distally.  He was transferred to [facility].  He was started on heparin drip.  Vascular surgery was consulted.  Dr. Cameron performed left lower extremity bypass on 3/13/2021, however after the procedure at night noted to have bleeding at left groin site, sand bag applied, vitals stable and H/H stable.  During surgery and postop unable to find pulses on doppler, concern for thrombosis. He was taken back to OR on 3/14/21 for thrombectomy and revision.  He then had prevena placed. wound care was consulted and wound VAC was placed, his swelling was not improved well, artery Doppler on 3/17 did not show aneurysm however showed hematoma, will continue monitoring and treatment patient has improved following get better, and he feels stable for discharge, and he was cleared from surgery team for discharge.    Patient has a deep wound on the left foot, x-ray showed possible osteomyelitis, MRI did not show any signs of osteomyelitis, patient was treated with Unasyn and discharged on Augmentin to finish 7 days course of antibiotics.    Patient was evaluated by PT and OT and no need for therapy, home health was ordered for wound care, however due to restricted insurance,  patient preferred to follow-up with wound clinic, next appointment will be 3/22 at 0745.    Patient has history of heavy smoking for years, I had a long discussion with the patient about importance of quitting smoking, and discussed the risk including not limited to worsening peripheral vascular disease, COPD and lung cancer, he understood however he is not willing to quit.    Labs showed A1c 6, encouraged the patient for healthy diet and exercise and no need for medication at this time.    My day of discharge the patient is stable, he has minimal swelling, no fever, labs showing improving on inflammation, he has good pulses on the left leg, alert and oriented x3, he is able to move around with wound VAC. Patient will be discharged with gabapentin, oxycodone and aspirin, discussed the risk/benefit of this medication and avoid drinking or driving when he takes oxycodone, patient understood and agreed.    Therefore, he is discharged in good and stable condition to home with close outpatient follow-up.    The patient met 2-midnight criteria for an inpatient stay at the time of discharge.    Discharge Date  03/20/21      FOLLOW UP ITEMS POST DISCHARGE  Follow-up with wound clinic  Follow-up with vascular surgeon for peripheral vascular disease  Follow-up with primary care doctor for comorbidities    DISCHARGE DIAGNOSES  Principal Problem (Resolved):    Femoral artery occlusion (HCC) POA: Unknown  Active Problems:    Penetrating foot wound POA: Unknown    Essential hypertension POA: Yes    Hyponatremia POA: No    Alcohol use disorder, moderate, in controlled environment (HCC) POA: Yes    Mixed hyperlipidemia POA: Yes    Normocytic anemia POA: No    Current every day smoker POA: Yes    Polycythemia POA: Yes  Resolved Problems:    Leukocytosis POA: Unknown    Acute respiratory insufficiency POA: No    Rash of back POA: Yes      FOLLOW UP  Future Appointments   Date Time Provider Department Center   9/15/2021 10:00 AM Joann  PRO Carmona [facility]  [address] floor,   [place] [address]  [phone]  Go on 3/22/2021  For wound re-check. Go to at 7:45 am. Please bring ID and insurance card.     Brian Cameron M.D.  [address] [facility] [address] [address]  [place] [id number] [phone]    Go on 3/30/2021  For suture removal    Joann Carmona P.A.-C.  [address]  [facility] [id number] [phone]    In 1 week        MEDICATIONS ON DISCHARGE     Medication List      START taking these medications      Instructions   amoxicillin-clavulanate 875-125 MG Tabs  Commonly known as: AUGMENTIN   Take 1 tablet by mouth 2 times a day for 3 days.  Dose: 1 tablet     aspirin EC 81 MG Tbec  Commonly known as: ECOTRIN   Take 1 tablet by mouth every day.  Dose: 81 mg     oxyCODONE-acetaminophen 5-325 MG Tabs  Commonly known as: PERCOCET   Take 1-2 Tablets by mouth every 6 hours as needed for Moderate Pain or Severe Pain for up to 5 days.  Dose: 1-2 tablet     sulfamethoxazole-trimethoprim 800-160 MG tablet  Commonly known as: BACTRIM DS   Take 1 tablet by mouth 2 times a day.  Dose: 1 tablet        CONTINUE taking these medications      Instructions   Aleve 220 MG tablet  Generic drug: naproxen   Take 440 mg by mouth 2 times a day as needed (Pain). 2 tablets = 440 mg.  Dose: 440 mg     gabapentin 300 MG Caps  Commonly known as: NEURONTIN   Take 1 capsule by mouth 2 Times a Day.  Dose: 300 mg     ibuprofen 200 MG Tabs  Commonly known as: MOTRIN   Take 600-800 mg by mouth every 6 hours as needed (Moderate Pain). 3 to 4 tablets = 600 to 800 mg.  Dose: 600-800 mg     tadalafil 20 MG tablet  Commonly known as: Cialis   Doctor's comments: Please dispense brand name.  Take 1 tablet by mouth as needed for Erectile Dysfunction.  Dose: 20 mg        STOP taking these medications    acetaminophen-codeine #3 300-30 MG Tabs  Commonly known as: TYLENOL #3     cephALEXin 500 MG Caps  Commonly  known as: KEFLEX            Allergies  No Known Allergies    DIET  Orders Placed This Encounter   Procedures   • Diet Order Diet: Regular     Standing Status:   Standing     Number of Occurrences:   1     Order Specific Question:   Diet:     Answer:   Regular [1]       ACTIVITY  As tolerated.  Weight bearing as tolerated    CONSULTATIONS  Vascular surgeon    PROCEDURES  On 3/13:  Left common femoral to below-the-knee common femoral to tibioperoneal trunk bypass with in situ greater saphenous vein.    On 3/14:  Left lower extremity Harinder catheter thrombectomy of [id number]-uk-xhnumnkwd bypass graft.    LABORATORY  Lab Results   Component Value Date    SODIUM 127 (L) 03/20/2021    POTASSIUM 3.5 (L) 03/20/2021    CHLORIDE 93 (L) 03/20/2021    CO2 25 03/20/2021    GLUCOSE 104 (H) 03/20/2021    BUN 9 03/20/2021    CREATININE 0.66 03/20/2021        Lab Results   Component Value Date    WBC 11.5 (H) 03/20/2021    HEMOGLOBIN 12.9 (L) 03/20/2021    HEMATOCRIT 39.4 (L) 03/20/2021    PLATELETCT 299 03/20/2021        Total time of the discharge process exceeds 35 minutes.

## 2021-03-20 NOTE — PROGRESS NOTES
Discharge instructions given to pt, questions were answer. Pt arrived to Discharge Lounge with NO IV, it was discontinue prior to came to the lounge. List of medications were reviewed, pt verbalized understanding. Meds to bed were delivered in the unit. Pt is being discharge with a portable wound vac, he will follow with the wound clinic this coming Monday 3/22. Will follow up with his primary doctor and with the vascular surgeon in 2 weeks.

## 2021-03-20 NOTE — PROGRESS NOTES
Pt off floor to d/c [facility]. All belonging with patient at time of leaving floor. Pt has home wound vac hooked up to 125mmHg and home meds. Education provided on changing canister. Medicated for pain before leaving floor.  Pt is able to teach back the importance of floow up care and is able to ID phone numbers for troubleshooting/help with vac at home.

## 2021-03-20 NOTE — PROGRESS NOTES
"Vascular    Events  3/15: RHONDA, pain controlled, was up ambulating today, bypass patent  3/16: RHONDA, pain controlled, was up ambulating today, bypass patent, veraflo vac applied  3/17: Increasing left groin pain and swelling however ultrasound showed no evidence of pseudoaneurysm, there is a lymphocele or hematoma, bypass patent, veraflo vac in place  3/18: RHONDA, left leg feeling a little better  3/19: RHONDA, left leg feeling a little better, Prevena removed and incision healing well  3/20: Doing well, wants to go home today    Vitals  /78   Pulse 77   Temp 36.6 °C (97.8 °F) (Temporal)   Resp 18   Ht 1.956 m (6' 5\")   Wt 95.2 kg (209 lb 14.1 oz)   SpO2 98%   BMI 24.89 kg/m²     Exam  Bypass patent  Bandages changed, incisions healing well,   Some erythema and ecchymosis on the left medial upper thigh - unclear cause, improved from yesterday  Vera flow wound VAC on the left foot    Labs  WBC 14 -> 13 -> 10 -> 12 -> 13 -> 11 today  Hgb 13 stable  Creatinine normal    MRI 3/18 negative for osteo    A/P)  3/13: LLE Fem-TPT bypass with in situ vein  3/14: LLE fem-TPT thrombectomy and revision    Feeling well  Local care for the foot  Continue empiric IV ABx due to erythema of the left thigh and also infected left foot ulcer  Aspirin 81 mg daily indefinitely    Home today with outpatient wound clinic for foot vac, Rx for percocet/bactrim/gabapentin/aspirin should be delivered through meds-to-beds    Brian Cameron MD  [facility] (General and Vascular Surgery)  Cell: [phone] (text or call is fine, if you don't reach me please try my office)  Office: [phone]  __________________________________________________________________  Patient:Antonio Cantu   MRN:[id number]   CSN:[id number]        "

## 2021-03-20 NOTE — DISCHARGE INSTRUCTIONS
Discharge Instructions    Discharged to home by car with relative. Discharged via wheelchair, hospital escort: Yes.  Special equipment needed: Not Applicable    Be sure to schedule a follow-up appointment with your primary care doctor or any specialists as instructed.     Discharge Plan:   Diet Plan: Discussed  Activity Level: Discussed  Confirmed Follow up Appointment: Patient to Call and Schedule Appointment  Confirmed Symptoms Management: Discussed  Medication Reconciliation Updated: Yes  Influenza Vaccine Indication: Patient Refuses    I understand that a diet low in cholesterol, fat, and sodium is recommended for good health. Unless I have been given specific instructions below for another diet, I accept this instruction as my diet prescription.   Other diet: Regular diet    Special Instructions: None    · Is patient discharged on Warfarin / Coumadin?   No     Depression / Suicide Risk    As you are discharged from this [facility] facility, it is important to learn how to keep safe from harming yourself.    Recognize the warning signs:  · Abrupt changes in personality, positive or negative- including increase in energy   · Giving away possessions  · Change in eating patterns- significant weight changes-  positive or negative  · Change in sleeping patterns- unable to sleep or sleeping all the time   · Unwillingness or inability to communicate  · Depression  · Unusual sadness, discouragement and loneliness  · Talk of wanting to die  · Neglect of personal appearance   · Rebelliousness- reckless behavior  · Withdrawal from people/activities they love  · Confusion- inability to concentrate     If you or a loved one observes any of these behaviors or has concerns about self-harm, here's what you can do:  · Talk about it- your feelings and reasons for harming yourself  · Remove any means that you might use to hurt yourself (examples: pills, rope, extension cords, firearm)  · Get professional help from the community  (Mental Health, Substance Abuse, psychological counseling)  · Do not be alone:Call your Safe Contact- someone whom you trust who will be there for you.  · Call your local CRISIS HOTLINE 230-0475 or 019-496-1748  · Call your local Children's Mobile Crisis Response Team [place] (277) 228-0171 or www.[employer] Mobile  · Call the toll free National Suicide Prevention Hotlines   · National Suicide Prevention Lifeline 619-515-RXHP (1907)  · National Hope Line Network 800-SUICIDE (748-3650)    Amoxicillin; Clavulanic Acid tablets  What is this medicine?  AMOXICILLIN; CLAVULANIC ACID (a mox i NATANAEL in; TIFFANI vernon ic AS id) is a penicillin antibiotic. It is used to treat certain kinds of bacterial infections. It will not work for colds, flu, or other viral infections.  This medicine may be used for other purposes; ask your health care provider or pharmacist if you have questions.  COMMON BRAND NAME(S): Augmentin  What should I tell my health care provider before I take this medicine?  They need to know if you have any of these conditions:  · bowel disease, like colitis  · kidney disease  · liver disease  · mononucleosis  · an unusual or allergic reaction to amoxicillin, penicillin, cephalosporin, other antibiotics, clavulanic acid, other medicines, foods, dyes, or preservatives  · pregnant or trying to get pregnant  · breast-feeding  How should I use this medicine?  Take this medicine by mouth with a full glass of water. Follow the directions on the prescription label. Take at the start of a meal. Do not crush or chew. If the tablet has a score line, you may cut it in half at the score line for easier swallowing. Take your medicine at regular intervals. Do not take your medicine more often than directed. Take all of your medicine as directed even if you think you are better. Do not skip doses or stop your medicine early.  Talk to your pediatrician regarding the use of this medicine in children. Special care may be  needed.  Overdosage: If you think you have taken too much of this medicine contact a poison control center or emergency room at once.  NOTE: This medicine is only for you. Do not share this medicine with others.  What if I miss a dose?  If you miss a dose, take it as soon as you can. If it is almost time for your next dose, take only that dose. Do not take double or extra doses.  What may interact with this medicine?  · allopurinol  · anticoagulants  · birth control pills  · methotrexate  · probenecid  This list may not describe all possible interactions. Give your health care provider a list of all the medicines, herbs, non-prescription drugs, or dietary supplements you use. Also tell them if you smoke, drink alcohol, or use illegal drugs. Some items may interact with your medicine.  What should I watch for while using this medicine?  Tell your doctor or healthcare provider if your symptoms do not improve.  This medicine may cause serious skin reactions. They can happen weeks to months after starting the medicine. Contact your healthcare provider right away if you notice fevers or flu-like symptoms with a rash. The rash may be red or purple and then turn into blisters or peeling of the skin. Or, you might notice a red rash with swelling of the face, lips or lymph nodes in your neck or under your arms.  Do not treat diarrhea with over the counter products. Contact your doctor if you have diarrhea that lasts more than 2 days or if it is severe and watery.  If you have diabetes, you may get a false-positive result for sugar in your urine. Check with your doctor or healthcare provider.  Birth control pills may not work properly while you are taking this medicine. Talk to your doctor about using an extra method of birth control.  What side effects may I notice from receiving this medicine?  Side effects that you should report to your doctor or health care professional as soon as possible:  · allergic reactions like skin  rash, itching or hives, swelling of the face, lips, or tongue  · breathing problems  · dark urine  · fever or chills, sore throat  · redness, blistering, peeling, or loosening of the skin, including inside the mouth  · seizures  · trouble passing urine or change in the amount of urine  · unusual bleeding, bruising  · unusually weak or tired  · white patches or sores in the mouth or throat  Side effects that usually do not require medical attention (report to your doctor or health care professional if they continue or are bothersome):  · diarrhea  · dizziness  · headache  · nausea, vomiting  · stomach upset  · vaginal or anal irritation  This list may not describe all possible side effects. Call your doctor for medical advice about side effects. You may report side effects to FDA at 8-472-FDA-2021.  Where should I keep my medicine?  Keep out of the reach of children.  Store at room temperature below 25 degrees C (77 degrees F). Keep container tightly closed. Throw away any unused medicine after the expiration date.  NOTE: This sheet is a summary. It may not cover all possible information. If you have questions about this medicine, talk to your doctor, pharmacist, or health care provider.  © 2020 Elsevier/Gold Standard (2020-03-02 09:43:46)  Acetaminophen; Oxycodone tablets  What is this medicine?  ACETAMINOPHEN; OXYCODONE (a set a MIGUEL leoncio fen; ox i KOE done) is a pain reliever. It is used to treat moderate to severe pain.  This medicine may be used for other purposes; ask your health care provider or pharmacist if you have questions.  COMMON BRAND NAME(S): Endocet, Magnacet, Nalocet, Narvox, Percocet, Perloxx, Primalev, Primlev, Roxicet, Xolox  What should I tell my health care provider before I take this medicine?  They need to know if you have any of these conditions:  · brain tumor  · Crohn's disease, inflammatory bowel disease, or ulcerative colitis  · drug abuse or addiction  · head injury  · heart or  circulation problems  · if you often drink alcohol  · kidney disease or problems going to the bathroom  · liver disease  · lung disease, asthma, or breathing problems  · an unusual or allergic reaction to acetaminophen, oxycodone, other opioid analgesics, other medicines, foods, dyes, or preservatives  · pregnant or trying to get pregnant  · breast-feeding  How should I use this medicine?  Take this medicine by mouth with a full glass of water. Follow the directions on the prescription label. You can take it with or without food. If it upsets your stomach, take it with food. Take your medicine at regular intervals. Do not take it more often than directed.  A special MedGuide will be given to you by the pharmacist with each prescription and refill. Be sure to read this information carefully each time.  Talk to your pediatrician regarding the use of this medicine in children. Special care may be needed.  Overdosage: If you think you have taken too much of this medicine contact a poison control center or emergency room at once.  NOTE: This medicine is only for you. Do not share this medicine with others.  What if I miss a dose?  If you miss a dose, take it as soon as you can. If it is almost time for your next dose, take only that dose. Do not take double or extra doses.  What may interact with this medicine?  This medicine may interact with the following medications:  · alcohol  · antihistamines for allergy, cough and cold  · antiviral medicines for HIV or AIDS  · atropine  · certain antibiotics like clarithromycin, erythromycin, linezolid, rifampin  · certain medicines for anxiety or sleep  · certain medicines for bladder problems like oxybutynin, tolterodine  · certain medicines for depression like amitriptyline, fluoxetine, sertraline  · certain medicines for fungal infections like ketoconazole, itraconazole, voriconazole  · certain medicines for migraine headache like almotriptan, eletriptan, frovatriptan,  naratriptan, rizatriptan, sumatriptan, zolmitriptan  · certain medicines for nausea or vomiting like dolasetron, ondansetron, palonosetron  · certain medicines for Parkinson's disease like benztropine, trihexyphenidyl  · certain medicines for seizures like phenobarbital, phenytoin, primidone  · certain medicines for stomach problems like dicyclomine, hyoscyamine  · certain medicines for travel sickness like scopolamine  · diuretics  · general anesthetics like halothane, isoflurane, methoxyflurane, propofol  · ipratropium  · local anesthetics like lidocaine, pramoxine, tetracaine  · MAOIs like Carbex, Eldepryl, Marplan, Nardil, and Parnate  · medicines that relax muscles for surgery  · methylene blue  · nilotinib  · other medicines with acetaminophen  · other narcotic medicines for pain or cough  · phenothiazines like chlorpromazine, mesoridazine, prochlorperazine, thioridazine  This list may not describe all possible interactions. Give your health care provider a list of all the medicines, herbs, non-prescription drugs, or dietary supplements you use. Also tell them if you smoke, drink alcohol, or use illegal drugs. Some items may interact with your medicine.  What should I watch for while using this medicine?  Tell your doctor or health care professional if your pain does not go away, if it gets worse, or if you have new or a different type of pain. You may develop tolerance to the medicine. Tolerance means that you will need a higher dose of the medication for pain relief. Tolerance is normal and is expected if you take this medicine for a long time.  Do not suddenly stop taking your medicine because you may develop a severe reaction. Your body becomes used to the medicine. This does NOT mean you are addicted. Addiction is a behavior related to getting and using a drug for a non-medical reason. If you have pain, you have a medical reason to take pain medicine. Your doctor will tell you how much medicine to take.  If your doctor wants you to stop the medicine, the dose will be slowly lowered over time to avoid any side effects.  There are different types of narcotic medicines (opiates). If you take more than one type at the same time or if you are taking another medicine that also causes drowsiness, you may have more side effects. Give your health care provider a list of all medicines you use. Your doctor will tell you how much medicine to take. Do not take more medicine than directed. Call emergency for help if you have problems breathing or unusual sleepiness.  Do not take other medicines that contain acetaminophen with this medicine. Always read labels carefully. If you have questions, ask your doctor or pharmacist.  If you take too much acetaminophen get medical help right away. Too much acetaminophen can be very dangerous and cause liver damage. Even if you do not have symptoms, it is important to get help right away.  You may get drowsy or dizzy. Do not drive, use machinery, or do anything that needs mental alertness until you know how this medicine affects you. Do not stand or sit up quickly, especially if you are an older patient. This reduces the risk of dizzy or fainting spells. Alcohol may interfere with the effect of this medicine. Avoid alcoholic drinks.  The medicine will cause constipation. Try to have a bowel movement at least every 2 to 3 days. If you do not have a bowel movement for 3 days, call your doctor or health care professional.  Your mouth may get dry. Chewing sugarless gum or sucking hard candy, and drinking plenty or water may help. Contact your doctor if the problem does not go away or is severe.  What side effects may I notice from receiving this medicine?  Side effects that you should report to your doctor or health care professional as soon as possible:  · allergic reactions like skin rash, itching or hives, swelling of the face, lips, or tongue  · breathing problems  · confusion  · redness,  blistering, peeling or loosening of the skin, including inside the mouth  · signs and symptoms of liver injury like dark yellow or brown urine; general ill feeling or flu-like symptoms; light-colored stools; loss of appetite; nausea; right upper belly pain; unusually weak or tired; yellowing of the eyes or skin  · signs and symptoms of low blood pressure like dizziness; feeling faint or lightheaded, falls; unusually weak or tired  · trouble passing urine or change in the amount of urine  Side effects that usually do not require medical attention (report to your doctor or health care professional if they continue or are bothersome):  · constipation  · dry mouth  · nausea, vomiting  · tiredness  This list may not describe all possible side effects. Call your doctor for medical advice about side effects. You may report side effects to FDA at 7-690-FDA-7664.  Where should I keep my medicine?  Keep out of the reach of children. This medicine can be abused. Keep your medicine in a safe place to protect it from theft. Do not share this medicine with anyone. Selling or giving away this medicine is dangerous and against the law.  Store at room temperature between 20 and 25 degrees C (68 and 77 degrees F).  This medicine may cause harm and death if it is taken by other adults, children, or pets. Return medicine that has not been used to an official disposal site. Contact the [facility] at [phone] or your [facility] government to find a site. If you cannot return the medicine, flush it down the toilet. Do not use the medicine after the expiration date.  NOTE: This sheet is a summary. It may not cover all possible information. If you have questions about this medicine, talk to your doctor, pharmacist, or health care provider.  © 2020 Elsevier/Gold Standard (2018-04-24 15:46:38)  Sulfamethoxazole; Trimethoprim, SMX-TMP tablets  What is this medicine?  SULFAMETHOXAZOLE; TRIMETHOPRIM or SMX-TMP (rejihl joanh meth OK ai mosley; ángel  METH oh prim) is a combination of a sulfonamide antibiotic and a second antibiotic, trimethoprim. It is used to treat or prevent certain kinds of bacterial infections. It will not work for colds, flu, or other viral infections.  This medicine may be used for other purposes; ask your health care provider or pharmacist if you have questions.  COMMON BRAND NAME(S): Bacter-Aid DS, Bactrim, Bactrim DS, Septra, Septra DS  What should I tell my health care provider before I take this medicine?  They need to know if you have any of these conditions:  · anemia  · asthma  · being treated with anticonvulsants  · if you frequently drink alcohol containing drinks  · kidney disease  · liver disease  · low level of folic acid or glucose-6-phosphate dehydrogenase  · poor nutrition or malabsorption  · porphyria  · severe allergies  · thyroid disorder  · an unusual or allergic reaction to sulfamethoxazole, trimethoprim, sulfa drugs, other medicines, foods, dyes, or preservatives  · pregnant or trying to get pregnant  · breast-feeding  How should I use this medicine?  Take this medicine by mouth with a full glass of water. Follow the directions on the prescription label. Take your medicine at regular intervals. Do not take it more often than directed. Do not skip doses or stop your medicine early.  Talk to your pediatrician regarding the use of this medicine in children. Special care may be needed. This medicine has been used in children as young as 2 months of age.  Overdosage: If you think you have taken too much of this medicine contact a poison control center or emergency room at once.  NOTE: This medicine is only for you. Do not share this medicine with others.  What if I miss a dose?  If you miss a dose, take it as soon as you can. If it is almost time for your next dose, take only that dose. Do not take double or extra doses.  What may interact with this medicine?  Do not take this medicine with any of the following  medications:  · aminobenzoate potassium  · dofetilide  · metronidazole  This medicine may also interact with the following medications:  · ACE inhibitors like benazepril, enalapril, lisinopril, and ramipril  · birth control pills  · cyclosporine  · digoxin  · diuretics  · indomethacin  · medicines for diabetes  · methenamine  · methotrexate  · phenytoin  · potassium supplements  · pyrimethamine  · sulfinpyrazone  · tricyclic antidepressants  · warfarin  This list may not describe all possible interactions. Give your health care provider a list of all the medicines, herbs, non-prescription drugs, or dietary supplements you use. Also tell them if you smoke, drink alcohol, or use illegal drugs. Some items may interact with your medicine.  What should I watch for while using this medicine?  Tell your doctor or health care professional if your symptoms do not improve. Drink several glasses of water a day to reduce the risk of kidney problems.  Do not treat diarrhea with over the counter products. Contact your doctor if you have diarrhea that lasts more than 2 days or if it is severe and watery.  This medicine can make you more sensitive to the sun. Keep out of the sun. If you cannot avoid being in the sun, wear protective clothing and use a sunscreen. Do not use sun lamps or tanning beds/booths.  What side effects may I notice from receiving this medicine?  Side effects that you should report to your doctor or health care professional as soon as possible:  · allergic reactions like skin rash or hives, swelling of the face, lips, or tongue  · breathing problems  · fever or chills, sore throat  · irregular heartbeat, chest pain  · joint or muscle pain  · pain or difficulty passing urine  · red pinpoint spots on skin  · redness, blistering, peeling or loosening of the skin, including inside the mouth  · unusual bleeding or bruising  · unusually weak or tired  · yellowing of the eyes or skin  Side effects that usually do not  require medical attention (report to your doctor or health care professional if they continue or are bothersome):  · diarrhea  · dizziness  · headache  · loss of appetite  · nausea, vomiting  · nervousness  This list may not describe all possible side effects. Call your doctor for medical advice about side effects. You may report side effects to FDA at 6-308-YFV-6587.  Where should I keep my medicine?  Keep out of the reach of children.  Store at room temperature between 20 to 25 degrees C (68 to 77 degrees F). Protect from light. Throw away any unused medicine after the expiration date.  NOTE: This sheet is a summary. It may not cover all possible information. If you have questions about this medicine, talk to your doctor, pharmacist, or health care provider.  © 2020 Elsevier/Gold Standard (2014-07-25 14:38:26)

## 2021-03-20 NOTE — DISCHARGE PLANNING
Meds-to-Beds: Discharge prescription orders listed below delivered to patient's bedside. RN notified. Patient counseled. Pt asked to have co-pay charged to account. Pt ID verified via armband.     Antonio Cantu   Home Medication Instructions TOMI:[id number]    Printed on:03/20/21 7563   Medication Information                      amoxicillin-clavulanate (AUGMENTIN) 875-125 MG Tab  Take 1 tablet by mouth 2 times a day for 3 days.             aspirin EC (ECOTRIN) 81 MG Tablet Delayed Response  Take 1 tablet by mouth every day.             gabapentin (NEURONTIN) 300 MG Cap  Take 1 capsule by mouth 2 Times a Day.             oxyCODONE-acetaminophen (PERCOCET) 5-325 MG Tab  Take 1-2 Tablets by mouth every 6 hours as needed for Moderate Pain or Severe Pain for up to 5 days.             sulfamethoxazole-trimethoprim (BACTRIM DS) 800-160 MG tablet  Take 1 tablet by mouth 2 times a day.                 Carson Gómez, Pharmacy Intern

## 2021-03-21 LAB
BACTERIA BLD CULT: NORMAL
BACTERIA BLD CULT: NORMAL
SIGNIFICANT IND 70042: NORMAL
SIGNIFICANT IND 70042: NORMAL
SITE SITE: NORMAL
SITE SITE: NORMAL
SOURCE SOURCE: NORMAL
SOURCE SOURCE: NORMAL

## 2021-03-24 ENCOUNTER — APPOINTMENT (OUTPATIENT)
Dept: RADIOLOGY | Facility: MEDICAL CENTER | Age: 61
End: 2021-03-24
Attending: EMERGENCY MEDICINE
Payer: OTHER MISCELLANEOUS

## 2021-03-24 ENCOUNTER — HOSPITAL ENCOUNTER (EMERGENCY)
Facility: MEDICAL CENTER | Age: 61
End: 2021-03-24
Attending: EMERGENCY MEDICINE
Payer: OTHER MISCELLANEOUS

## 2021-03-24 VITALS
HEART RATE: 62 BPM | RESPIRATION RATE: 16 BRPM | BODY MASS INDEX: 25.85 KG/M2 | HEIGHT: 77 IN | SYSTOLIC BLOOD PRESSURE: 97 MMHG | DIASTOLIC BLOOD PRESSURE: 58 MMHG | OXYGEN SATURATION: 92 % | TEMPERATURE: 98 F | WEIGHT: 218.92 LBS

## 2021-03-24 DIAGNOSIS — I73.9 PERIPHERAL VASCULAR DISEASE (HCC): ICD-10-CM

## 2021-03-24 DIAGNOSIS — I96 BLACK TOE (HCC): ICD-10-CM

## 2021-03-24 LAB
ALBUMIN SERPL BCP-MCNC: 3.3 G/DL (ref 3.2–4.9)
ALBUMIN/GLOB SERPL: 0.9 G/DL
ALP SERPL-CCNC: 85 U/L (ref 30–99)
ALT SERPL-CCNC: 15 U/L (ref 2–50)
ANION GAP SERPL CALC-SCNC: 9 MMOL/L (ref 7–16)
AST SERPL-CCNC: 15 U/L (ref 12–45)
BASOPHILS # BLD AUTO: 0.6 % (ref 0–1.8)
BASOPHILS # BLD: 0.06 K/UL (ref 0–0.12)
BILIRUB SERPL-MCNC: 0.3 MG/DL (ref 0.1–1.5)
BUN SERPL-MCNC: 11 MG/DL (ref 8–22)
CALCIUM SERPL-MCNC: 10.3 MG/DL (ref 8.5–10.5)
CHLORIDE SERPL-SCNC: 101 MMOL/L (ref 96–112)
CO2 SERPL-SCNC: 24 MMOL/L (ref 20–33)
CREAT SERPL-MCNC: 0.85 MG/DL (ref 0.5–1.4)
CRP SERPL HS-MCNC: 5.28 MG/DL (ref 0–0.75)
EOSINOPHIL # BLD AUTO: 0.16 K/UL (ref 0–0.51)
EOSINOPHIL NFR BLD: 1.5 % (ref 0–6.9)
ERYTHROCYTE [DISTWIDTH] IN BLOOD BY AUTOMATED COUNT: 44 FL (ref 35.9–50)
GLOBULIN SER CALC-MCNC: 3.6 G/DL (ref 1.9–3.5)
GLUCOSE SERPL-MCNC: 138 MG/DL (ref 65–99)
HCT VFR BLD AUTO: 38.6 % (ref 42–52)
HGB BLD-MCNC: 12.7 G/DL (ref 14–18)
IMM GRANULOCYTES # BLD AUTO: 0.17 K/UL (ref 0–0.11)
IMM GRANULOCYTES NFR BLD AUTO: 1.6 % (ref 0–0.9)
LACTATE BLD-SCNC: 2.2 MMOL/L (ref 0.5–2)
LYMPHOCYTES # BLD AUTO: 1.18 K/UL (ref 1–4.8)
LYMPHOCYTES NFR BLD: 11 % (ref 22–41)
MCH RBC QN AUTO: 31.1 PG (ref 27–33)
MCHC RBC AUTO-ENTMCNC: 32.9 G/DL (ref 33.7–35.3)
MCV RBC AUTO: 94.6 FL (ref 81.4–97.8)
MONOCYTES # BLD AUTO: 1.05 K/UL (ref 0–0.85)
MONOCYTES NFR BLD AUTO: 9.8 % (ref 0–13.4)
NEUTROPHILS # BLD AUTO: 8.14 K/UL (ref 1.82–7.42)
NEUTROPHILS NFR BLD: 75.5 % (ref 44–72)
NRBC # BLD AUTO: 0 K/UL
NRBC BLD-RTO: 0 /100 WBC
PLATELET # BLD AUTO: 536 K/UL (ref 164–446)
PMV BLD AUTO: 9.2 FL (ref 9–12.9)
POTASSIUM SERPL-SCNC: 4.9 MMOL/L (ref 3.6–5.5)
PROT SERPL-MCNC: 6.9 G/DL (ref 6–8.2)
RBC # BLD AUTO: 4.08 M/UL (ref 4.7–6.1)
SODIUM SERPL-SCNC: 134 MMOL/L (ref 135–145)
WBC # BLD AUTO: 10.8 K/UL (ref 4.8–10.8)

## 2021-03-24 PROCEDURE — 73660 X-RAY EXAM OF TOE(S): CPT | Mod: LT

## 2021-03-24 PROCEDURE — 93925 LOWER EXTREMITY STUDY: CPT

## 2021-03-24 PROCEDURE — 86140 C-REACTIVE PROTEIN: CPT

## 2021-03-24 PROCEDURE — 700102 HCHG RX REV CODE 250 W/ 637 OVERRIDE(OP): Performed by: EMERGENCY MEDICINE

## 2021-03-24 PROCEDURE — 96374 THER/PROPH/DIAG INJ IV PUSH: CPT

## 2021-03-24 PROCEDURE — 87040 BLOOD CULTURE FOR BACTERIA: CPT

## 2021-03-24 PROCEDURE — 83605 ASSAY OF LACTIC ACID: CPT

## 2021-03-24 PROCEDURE — 80053 COMPREHEN METABOLIC PANEL: CPT

## 2021-03-24 PROCEDURE — 85652 RBC SED RATE AUTOMATED: CPT

## 2021-03-24 PROCEDURE — 99285 EMERGENCY DEPT VISIT HI MDM: CPT

## 2021-03-24 PROCEDURE — 700111 HCHG RX REV CODE 636 W/ 250 OVERRIDE (IP): Performed by: EMERGENCY MEDICINE

## 2021-03-24 PROCEDURE — A9270 NON-COVERED ITEM OR SERVICE: HCPCS | Performed by: EMERGENCY MEDICINE

## 2021-03-24 PROCEDURE — 36415 COLL VENOUS BLD VENIPUNCTURE: CPT

## 2021-03-24 PROCEDURE — 93922 UPR/L XTREMITY ART 2 LEVELS: CPT

## 2021-03-24 PROCEDURE — 85025 COMPLETE CBC W/AUTO DIFF WBC: CPT

## 2021-03-24 RX ORDER — AMOXICILLIN AND CLAVULANATE POTASSIUM 875; 125 MG/1; MG/1
1 TABLET, FILM COATED ORAL 2 TIMES DAILY
Status: SHIPPED | COMMUNITY
End: 2021-09-15

## 2021-03-24 RX ORDER — ACETAMINOPHEN 500 MG
1000 TABLET ORAL ONCE
Status: COMPLETED | OUTPATIENT
Start: 2021-03-24 | End: 2021-03-24

## 2021-03-24 RX ORDER — IBUPROFEN 600 MG/1
600 TABLET ORAL ONCE
Status: COMPLETED | OUTPATIENT
Start: 2021-03-24 | End: 2021-03-24

## 2021-03-24 RX ORDER — MORPHINE SULFATE 4 MG/ML
4 INJECTION, SOLUTION INTRAMUSCULAR; INTRAVENOUS ONCE
Status: COMPLETED | OUTPATIENT
Start: 2021-03-24 | End: 2021-03-24

## 2021-03-24 RX ADMIN — ACETAMINOPHEN 1000 MG: 500 TABLET, FILM COATED ORAL at 22:53

## 2021-03-24 RX ADMIN — MORPHINE SULFATE 4 MG: 4 INJECTION INTRAVENOUS at 20:00

## 2021-03-24 RX ADMIN — IBUPROFEN 600 MG: 600 TABLET, FILM COATED ORAL at 22:53

## 2021-03-24 ASSESSMENT — FIBROSIS 4 INDEX: FIB4 SCORE: 0.77

## 2021-03-25 LAB — ERYTHROCYTE [SEDIMENTATION RATE] IN BLOOD BY WESTERGREN METHOD: 52 MM/HOUR (ref 0–20)

## 2021-03-25 NOTE — ED TRIAGE NOTES
"Chief Complaint   Patient presents with   • Wound Re-Check     61 yo male ambulatory to triage for above complaint. Pt sent form wound clinic. 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 Reports throbbing pain to L foot.     Educated on triage process, encourage to inform staff of any changes.     /72   Pulse 74   Temp 36.4 °C (97.5 °F) (Temporal)   Resp 16   Ht 1.956 m (6' 5\")   Wt 99.3 kg (218 lb 14.7 oz)   SpO2 95%   BMI 25.96 kg/m²   "

## 2021-03-25 NOTE — ED NOTES
Pt discharged home. Medicated per MAR. Dressing reapplied on surgical site. IV removed. AVS reviewed and understood, all questions answered.

## 2021-03-25 NOTE — DISCHARGE INSTRUCTIONS
Continue your antibiotics until they are completed as well as oral pain medicine.  Please continue to follow-up with wound care as well as with Dr. Cameron clinic this week.  Return to the emergency department for severe uncontrolled pain fevers or vomiting

## 2021-03-25 NOTE — ED PROVIDER NOTES
"ED Provider Note    CHIEF COMPLAINT  Chief Complaint   Patient presents with   • Wound Re-Check       HPI  Antonio Cantu is a 60 y.o. male who presents to the emergency department from wound clinic for concern for a blackened toe.  The patient was transferred here on the 13th of this month for left-sided femoral arterial occlusion and underwent a bypass with Dr. Cameron.  He was given antibiotics for concern for infection he is got a wound VAC in place he actually had to go back on the 14th for thrombectomy and revision.  He was discharged a few days ago and is mostly on Augmentin the patient states has been taking his antibiotics but over the last day or so the pain is gotten worse although he is not taking his prescribed pain medicine.  When he got to the wound clinic today they finally took down his dressing he had not seen it since being discharged and noted that his toe was turning black which is new.  No fevers or chills no discharge from the wound his wound VAC is in place he states that was the most uncomfortable part was the changing of the wound VAC  He endorses that otherwise his incisions look improved    Should be on augmentin for 7 days     3/13: LLE Fem-TPT bypass with in situ vein  3/14: LLE fem-TPT thrombectomy and revision    Us 3/17  Findings   Left-   Where visualized, no evidence of pseudoaneurysm.   Hypoechoic, avascular, structure visualized at the Left groin,    approximately 5 cm in size.   The bypass graft is patent, monophasic flow, multiple \"run-off\" channels    identified (high velocity, low diameter, arterial flow.).   Monophasic flow visualized at the ankle at both the posterior tibial and    anterior tibial arteries.    REVIEW OF SYSTEMS  Positives as above. Pertinent negatives include nausea vomiting new weakness numbness tingling fevers chills nausea vomiting abdominal pain hip pain knee pain  All other review of systems are negative    PAST MEDICAL HISTORY   has a past medical " "history of Adenomatous colon polyp (11/2018), BPH with obstruction/lower urinary tract symptoms, Cervical spine fracture (HCC), Chronic bronchitis (HCC), Current every day smoker (6/19/2018), Erectile dysfunction (6/19/2018), Hammer toe, Herpes, Hypertension, Insomnia, Mixed hyperlipidemia (9/6/2018), Polycythemia (9/6/2018), Prehypertension (6/19/2018), Seasonal allergic rhinitis, and Sleep apnea.    SOCIAL HISTORY  Social History     Tobacco Use   • Smoking status: Current Every Day Smoker     Packs/day: 1.00     Years: 45.00     Pack years: 45.00     Types: Cigarettes   • Smokeless tobacco: Never Used   • Tobacco comment: 20 cig/day   Substance and Sexual Activity   • Alcohol use: Yes     Alcohol/week: 3.0 - 3.6 oz     Types: 5 - 6 Cans of beer per week     Comment: 1-6 beers/day    • Drug use: Yes     Types: Marijuana, Inhaled     Comment: Occ   • Sexual activity: Yes     Partners: Female       SURGICAL HISTORY   has a past surgical history that includes hammertoe correction; femoral popliteal bypass (Left, 3/13/2021); angiogram (Left, 3/13/2021); irrigation & debridement general (Left, 3/13/2021); femoral popliteal bypass (Left, 3/14/2021); angiogram (Left, 3/14/2021); and thrombectomy (Left, 3/14/2021).    CURRENT MEDICATIONS  Home Medications    **Home medications have not yet been reviewed for this encounter**         ALLERGIES  No Known Allergies    PHYSICAL EXAM  VITAL SIGNS: /72   Pulse 74   Temp 36.4 °C (97.5 °F) (Temporal)   Resp 16   Ht 1.956 m (6' 5\")   Wt 99.3 kg (218 lb 14.7 oz)   SpO2 95%   BMI 25.96 kg/m²    Pulse ox interpretation: I interpret this pulse ox as normal.  Constitutional: Alert in no apparent distress.  HENT: Normocephalic atraumatic, MMM  Eyes: PER, Conjunctiva normal, Non-icteric.   Neck: Normal range of motion, No tenderness, Supple, No stridor.   Cardiovascular: Regular rate and rhythm, no murmurs.   Thorax & Lungs: Normal breath sounds, No respiratory distress, No " wheezing, No chest tenderness.   Abdomen: Bowel sounds normal, Soft, No tenderness, No pulsatile masses. No peritoneal signs.  Skin: Warm, Dry, No erythema, No rash.   Back: No bony tenderness, No CVA tenderness.   Extremities/MSK: Diminished pulse on the right but good cap refill sensation intact to light touch distally, left foot there is a wound VAC in place from the dorsal aspect that wraps around to the plantar surface he does have cap refill about 3 seconds on digits 4 5 and 3 digit #1 is swollen and ecchymotic turning dark in color almost purplish to black digit #2 is beginning to turn that way at the tip  He has an incision with staples all the way from the medial ankle up to the mid thigh the incision is clean dry and intact without erythema or discharge from any part of the wound although the leg is warm in general with some nonpitting edema  Neurologic: Alert and oriented x3, No focal deficits noted.       DIFFERENTIAL DIAGNOSIS AND WORK UP PLAN    This is a 60 y.o. male who presents with evidence of early gangrene of the left toe based on his surgeries and the current placement of the wound VAC I would be surprised that great toes not getting a good blood supply as the wound VAC is over the dorsal medial aspect of the foot and wraps around the plantar surface where his DP artery of flow through.  Will evaluate with ultrasound and ABIs and discussed the case with his surgeon on call.  Also evaluate for worsening signs of infection although he states the incision looks better and I believe it also looks quite well    DIAGNOSTIC STUDIES / PROCEDURES    EKG      LABS  Pertinent Lab Findings  CBC with a normal hemoglobin without an elevated white blood cell count elevated platelet count with a mild left shift  Lactic acid is mildly elevated 2.2 without an acidosis and his CMP is CRP is elevated at 5 but that is down from 9 from 4 days ago set of improvement cultures were sent sed rate is  normal      RADIOLOGY  US-CARLA SINGLE LEVEL BILAT   Final Result      US-EXTREMITY ARTERY LOWER BILAT   Final Result      DX-TOE(S) 2+ LEFT   Final Result      1.  No acute osseous abnormality. No radiographic evidence of osteomyelitis.   2.  Chronic periarticular erosion involving the medial first metatarsal head, stable since prior study.            Findings - CARLA    Right.    Doppler waveform of the common femoral artery is of high amplitude and    triphasic.    Doppler waveforms at popliteal artery monophasic with moderate amplitude.    Doppler waveforms at the calf arteries and digits are absent.       Left.    Doppler waveforms at the common femoral and at the calf arteries are    monophasic with moderate amplitude.    Doppler waveforms at the first, second, and third toes are absent.    Flow seen in the 4th and 5th toe.       Arterial duplex scan was performed in accordance with lower extremity    arterial evaluation protocol - see separate report.      FINDINGS - Art bilateral    Right.    Flow seen in the common femoral and profunda femoral arteries.    The superficial femoral and popliteal arteries are occluded.    Reconstitution of flow is demonstrated in the anterior and posterior tibial    arteries.    Diminished flow seen in the anterior tibial and posterior tibial arteries.    The peroneal artery is occluded.       Left.    A bypass graft is present from the common femoral artery to the    tibioperoneal trunk.   Graft velocities (cm/sec): 216  Proximal anastomosis     626   Proximal        194    Mid    47     Distal    102     Distal anastomosis.    Stenosis of the proximal bypass graft. Velocities are consistent with >75%    stenosis.    Monophasic waveforms seen in the calf arteries.    Diminished flow seen in the distal anterior tibial and proximal peroneal    arteries.    The distal peroneal artery is occluded.       The radiologist's interpretation of all radiological studies have been reviewed  "by me.      COURSE & MEDICAL DECISION MAKING  Pertinent Labs & Imaging studies reviewed. (See chart for details)    9:33 PM  Called US - they are behind as the tech was at  and should be here shortly     10:05 PM  Spoke w Dr Dias on-call for Dr. Cameron regarding the patient's CARLA study and at this point he states he can follow-up in the clinic in the next 1 to 2 days.    10:41 PM  Spoke w DR Cameron after ARt US resulted just to discuss this possible bypass graft stenosis, and the patient's examination he states he knows the patient very well and after we discussed the imaging at detail he feels comfortable with the patient following up in his clinic as well as well as following up with wound clinic later this week.  After his recent surgery and everything was done it is possible he may end up losing his toes but at this point there is no need for hospitalization he should continue antibiotics at home.    I discussed this with the patient he understands he states monitoring at a hotel or here for the night because he wants to take one of his oxycodones he has not taken before take some Tylenol right now but if he cannot get a hotel this evening he will just drive home and take his pain meds at home.  He will follow up with the wound clinic as scheduled on Friday and follow-up with Dr. Cameron office will call them tomorrow.  He will return the emergency department for severe uncontrolled pain fevers or vomiting    BP (!) 97/58   Pulse 62   Temp 36.7 °C (98 °F) (Oral)   Resp 16   Ht 1.956 m (6' 5\")   Wt 99.3 kg (218 lb 14.7 oz)   SpO2 92%   BMI 25.96 kg/m²       I verified that the patient was wearing a mask and I was wearing appropriate PPE every time I entered the room. The patient's mask was on the patient at all times during my encounter except for a brief view of the oropharynx.    The patient will return for new or worsening symptoms and is stable at the time of discharge.    The patient is referred " to a primary physician for blood pressure management, diabetic screening, and for all other preventative health concerns.    DISPOSITION:  Patient will be discharged home in stable condition.    FOLLOW UP:  Brian Cameron M.D.  [address]  [address]  [facility] [id number] [phone]    Call on 3/25/2021      [facility], Emergency Dept  [address]  [place] [id number] [phone]    If symptoms worsen - uncontrolled pain      OUTPATIENT MEDICATIONS:  Discharge Medication List as of 3/24/2021 11:11 PM              FINAL IMPRESSION  1. Black toe (HCC)     2. Peripheral vascular disease (HCC)             Electronically signed by: Isamar Guy M.D., 3/24/2021 7:21 PM    This dictation has been created using voice recognition software and/or scribes. The accuracy of the dictation is limited by the abilities of the software and the expertise of the scribes. I expect there may be some errors of grammar and possibly content. I made every attempt to manually correct the errors within my dictation. However, errors related to voice recognition software and/or scribes may still exist and should be interpreted within the appropriate context.

## 2021-03-25 NOTE — ED NOTES
Med rec completed per Pt at bedside.  Allergies reviewed with Pt. No known drug allergies.  Pt was on a 3 day course of Augmentin 875-125 mg 1 tablet twice per day which Pt reports he FINISHED yesterday 3/23/2021.  Pt is currently on a 21 day course of Bactrim -160 mg 1 tablet twice per day started on 3/20/2021. Last dose today 3/24/2021 at 0600.  Pt takes ASPIRIN.

## 2021-04-14 ENCOUNTER — HOSPITAL ENCOUNTER (OUTPATIENT)
Facility: MEDICAL CENTER | Age: 61
End: 2021-04-14
Attending: ORTHOPAEDIC SURGERY | Admitting: ORTHOPAEDIC SURGERY
Payer: OTHER MISCELLANEOUS

## 2021-04-22 ENCOUNTER — HOSPITAL ENCOUNTER (OUTPATIENT)
Dept: HOSPITAL 8 - OUT | Age: 61
Discharge: HOME | End: 2021-04-22
Attending: ORTHOPAEDIC SURGERY
Payer: COMMERCIAL

## 2021-04-22 VITALS — BODY MASS INDEX: 23.9 KG/M2 | WEIGHT: 206.57 LBS | HEIGHT: 78 IN

## 2021-04-22 VITALS — SYSTOLIC BLOOD PRESSURE: 127 MMHG | DIASTOLIC BLOOD PRESSURE: 74 MMHG

## 2021-04-22 DIAGNOSIS — Z79.899: ICD-10-CM

## 2021-04-22 DIAGNOSIS — Z87.891: ICD-10-CM

## 2021-04-22 DIAGNOSIS — J44.9: ICD-10-CM

## 2021-04-22 DIAGNOSIS — Z20.822: ICD-10-CM

## 2021-04-22 DIAGNOSIS — L97.419: ICD-10-CM

## 2021-04-22 DIAGNOSIS — G89.18: ICD-10-CM

## 2021-04-22 DIAGNOSIS — I73.9: Primary | ICD-10-CM

## 2021-04-22 PROCEDURE — 28810 AMPUTATION TOE & METATARSAL: CPT

## 2021-04-22 PROCEDURE — 93005 ELECTROCARDIOGRAM TRACING: CPT

## 2021-04-22 PROCEDURE — U0003 INFECTIOUS AGENT DETECTION BY NUCLEIC ACID (DNA OR RNA); SEVERE ACUTE RESPIRATORY SYNDROME CORONAVIRUS 2 (SARS-COV-2) (CORONAVIRUS DISEASE [COVID-19]), AMPLIFIED PROBE TECHNIQUE, MAKING USE OF HIGH THROUGHPUT TECHNOLOGIES AS DESCRIBED BY CMS-2020-01-R: HCPCS

## 2021-04-22 PROCEDURE — 64447 NJX AA&/STRD FEMORAL NRV IMG: CPT

## 2021-04-22 PROCEDURE — 64445 NJX AA&/STRD SCIATIC NRV IMG: CPT

## 2021-04-22 PROCEDURE — 27687 REVISION OF CALF TENDON: CPT

## 2021-09-15 PROBLEM — I96 GANGRENE OF TOE (HCC): Status: ACTIVE | Noted: 2021-09-15

## 2021-09-15 PROBLEM — S98.112A AMPUTATION OF LEFT GREAT TOE (HCC): Status: ACTIVE | Noted: 2021-09-15

## 2021-09-15 PROBLEM — I73.9 PAD (PERIPHERAL ARTERY DISEASE) (HCC): Status: ACTIVE | Noted: 2021-09-15

## 2021-09-15 PROBLEM — S91.339A PENETRATING FOOT WOUND: Status: RESOLVED | Noted: 2021-03-17 | Resolved: 2021-09-15

## 2021-11-16 PROBLEM — S82.853A TRIMALLEOLAR FRACTURE OF ANKLE, CLOSED: Status: ACTIVE | Noted: 2021-11-16

## 2022-02-03 PROBLEM — S82.853A TRIMALLEOLAR FRACTURE OF ANKLE, CLOSED: Status: RESOLVED | Noted: 2021-11-16 | Resolved: 2022-02-03

## 2022-02-03 PROBLEM — D12.6 BENIGN NEOPLASM OF COLON: Status: ACTIVE | Noted: 2022-02-03

## 2022-02-03 PROBLEM — M20.40 HAMMER TOE: Status: ACTIVE | Noted: 2022-02-03

## 2022-02-03 PROBLEM — D12.6 BENIGN NEOPLASM OF COLON: Status: RESOLVED | Noted: 2022-02-03 | Resolved: 2022-02-03

## 2022-02-03 PROBLEM — N40.0 HYPERPLASIA OF PROSTATE: Status: ACTIVE | Noted: 2022-02-03

## 2022-02-03 PROBLEM — D45 POLYCYTHEMIA VERA (HCC): Status: ACTIVE | Noted: 2022-02-03

## 2022-02-03 PROBLEM — N40.0 HYPERPLASIA OF PROSTATE: Status: RESOLVED | Noted: 2022-02-03 | Resolved: 2022-02-03

## 2022-02-03 PROBLEM — G47.00 INSOMNIA: Status: ACTIVE | Noted: 2022-02-03

## 2022-02-03 PROBLEM — G47.30 SLEEP APNEA: Status: ACTIVE | Noted: 2022-02-03

## 2022-02-03 PROBLEM — I74.3 THROMBOSIS OF ARTERIES OF LOWER EXTREMITY (HCC): Status: ACTIVE | Noted: 2022-02-03

## 2022-02-03 PROBLEM — J30.9 ALLERGIC RHINITIS: Status: ACTIVE | Noted: 2022-02-03

## 2022-02-03 PROBLEM — D75.1 POLYCYTHEMIA: Status: RESOLVED | Noted: 2018-09-06 | Resolved: 2022-02-03

## 2022-02-08 PROBLEM — M86.9 OSTEOMYELITIS (HCC): Status: ACTIVE | Noted: 2022-02-08

## 2022-04-20 ENCOUNTER — HOSPITAL ENCOUNTER (OUTPATIENT)
Dept: RADIOLOGY | Facility: MEDICAL CENTER | Age: 62
End: 2022-04-20
Attending: SURGERY
Payer: OTHER MISCELLANEOUS

## 2022-04-20 DIAGNOSIS — I73.9 PERIPHERAL VASCULAR DISEASE, UNSPECIFIED (HCC): ICD-10-CM

## 2022-04-20 PROCEDURE — 93922 UPR/L XTREMITY ART 2 LEVELS: CPT

## 2022-04-20 PROCEDURE — 93925 LOWER EXTREMITY STUDY: CPT

## 2022-04-26 ENCOUNTER — HOSPITAL ENCOUNTER (OUTPATIENT)
Facility: MEDICAL CENTER | Age: 62
End: 2022-04-26
Attending: SURGERY | Admitting: SURGERY
Payer: OTHER MISCELLANEOUS

## 2022-04-28 NOTE — OR NURSING
COVID-19 Pre-surgery screening:    Left message for Pt to call back if experiencing any Covid symptoms, also advised of mask/visitor policies.

## 2022-10-31 ENCOUNTER — TELEPHONE (OUTPATIENT)
Dept: VASCULAR SURGERY | Facility: MEDICAL CENTER | Age: 62
End: 2022-10-31
Payer: OTHER MISCELLANEOUS

## 2022-10-31 NOTE — TELEPHONE ENCOUNTER
Patient left a message about scheduling for Vascular Surgery. I did call back but no answer so I left a vm to call back and schedule. [phone].

## 2022-11-09 PROBLEM — M86.9 OSTEOMYELITIS (HCC): Status: RESOLVED | Noted: 2022-02-08 | Resolved: 2022-11-09

## 2022-11-09 PROBLEM — G47.00 INSOMNIA: Status: RESOLVED | Noted: 2022-02-03 | Resolved: 2022-11-09

## 2022-11-09 PROBLEM — I74.3 THROMBOSIS OF ARTERIES OF LOWER EXTREMITY (HCC): Status: RESOLVED | Noted: 2022-02-03 | Resolved: 2022-11-09

## 2022-11-09 PROBLEM — M20.40 HAMMER TOE: Status: RESOLVED | Noted: 2022-02-03 | Resolved: 2022-11-09

## 2022-11-09 PROBLEM — S98.112A AMPUTATION OF LEFT GREAT TOE (HCC): Status: RESOLVED | Noted: 2021-09-15 | Resolved: 2022-11-09

## 2022-11-09 PROBLEM — D64.9 NORMOCYTIC ANEMIA: Status: RESOLVED | Noted: 2021-03-15 | Resolved: 2022-11-09

## 2022-11-09 PROBLEM — Z89.512 LEFT BELOW-KNEE AMPUTEE (HCC): Status: ACTIVE | Noted: 2022-11-09

## 2022-11-09 PROBLEM — I73.9 PAD (PERIPHERAL ARTERY DISEASE) (HCC): Status: RESOLVED | Noted: 2021-09-15 | Resolved: 2022-11-09

## 2022-11-09 PROBLEM — D45 POLYCYTHEMIA VERA (HCC): Status: RESOLVED | Noted: 2022-02-03 | Resolved: 2022-11-09

## 2022-11-09 PROBLEM — I10 PRIMARY HYPERTENSION: Status: RESOLVED | Noted: 2018-06-19 | Resolved: 2022-11-09

## 2022-11-09 PROBLEM — E87.1 HYPONATREMIA: Status: RESOLVED | Noted: 2021-03-14 | Resolved: 2022-11-09

## 2022-11-09 PROBLEM — I96 GANGRENE OF TOE (HCC): Status: RESOLVED | Noted: 2021-09-15 | Resolved: 2022-11-09

## 2023-06-13 ENCOUNTER — OFFICE VISIT (OUTPATIENT)
Dept: VASCULAR SURGERY | Facility: MEDICAL CENTER | Age: 63
End: 2023-06-13
Payer: OTHER MISCELLANEOUS

## 2023-06-13 VITALS
HEIGHT: 75 IN | SYSTOLIC BLOOD PRESSURE: 118 MMHG | WEIGHT: 206 LBS | TEMPERATURE: 98.5 F | HEART RATE: 98 BPM | DIASTOLIC BLOOD PRESSURE: 68 MMHG | OXYGEN SATURATION: 93 % | BODY MASS INDEX: 25.61 KG/M2

## 2023-06-13 DIAGNOSIS — I77.9 PAOD (PERIPHERAL ARTERIAL OCCLUSIVE DISEASE) (HCC): ICD-10-CM

## 2023-06-13 PROCEDURE — 3078F DIAST BP <80 MM HG: CPT | Performed by: SURGERY

## 2023-06-13 PROCEDURE — 99212 OFFICE O/P EST SF 10 MIN: CPT | Performed by: SURGERY

## 2023-06-13 PROCEDURE — 3074F SYST BP LT 130 MM HG: CPT | Performed by: SURGERY

## 2023-06-13 NOTE — PROGRESS NOTES
"                 VASCULAR SURGERY                 Clinic Progress Note  _________________________________    6/13/23: Patient has overall been doing well until recently when he was wearing his prosthesis more and developed a small ulceration on the end of his left BKA stump.  Fortunately this has been healing although it has not completely resolved yet.  No evidence of infection.  No exposed bone.  Patient does report claudication symptoms in the right leg but no rest pain or tissue loss.  He is still smoking but recognizes that it would be beneficial for him to quit.    Vitals  /68 (BP Location: Left arm, Patient Position: Sitting, BP Cuff Size: Adult)   Pulse 98   Temp 36.9 °C (98.5 °F) (Temporal)   Ht 1.905 m (6' 3\")   Wt 93.4 kg (206 lb)   SpO2 93%   BMI 25.75 kg/m²     small ulceration on the end of his left BKA stump.  Fortunately this has been healing although it has not completely resolved yet.  No evidence of infection.  No exposed bone.      A/P)  Small ulceration on the left BKA stump.  No surgical intervention needed at this time.  If this lesion were to get worse the patient actually has plenty of room to shorten his left BKA stump if the revision becomes necessary but I doubt it given the small size of this wound and the fact that has been slowly improving.  I did reach out to his prosthetic team to see if the socket can be modified in someway to help take more pressure off of this area.  Otherwise no vascular intervention indicated at this time.  Patient will follow-up with me on an as-needed basis if concerns arise.  Smoking cessation discussed and encouraged.      Brian Cameron MD  [facility]  [phone]  [address], [place] [address]  "

## 2023-11-07 ENCOUNTER — OFFICE VISIT (OUTPATIENT)
Dept: VASCULAR SURGERY | Facility: MEDICAL CENTER | Age: 63
End: 2023-11-07
Payer: OTHER MISCELLANEOUS

## 2023-11-07 VITALS
DIASTOLIC BLOOD PRESSURE: 70 MMHG | HEART RATE: 81 BPM | BODY MASS INDEX: 25.66 KG/M2 | TEMPERATURE: 98.9 F | SYSTOLIC BLOOD PRESSURE: 94 MMHG | WEIGHT: 206.35 LBS | HEIGHT: 75 IN | OXYGEN SATURATION: 95 %

## 2023-11-07 DIAGNOSIS — I77.9 PAOD (PERIPHERAL ARTERIAL OCCLUSIVE DISEASE) (HCC): ICD-10-CM

## 2023-11-07 PROCEDURE — 3078F DIAST BP <80 MM HG: CPT | Performed by: SURGERY

## 2023-11-07 PROCEDURE — 3074F SYST BP LT 130 MM HG: CPT | Performed by: SURGERY

## 2023-11-07 PROCEDURE — 99214 OFFICE O/P EST MOD 30 MIN: CPT | Performed by: SURGERY

## 2023-11-07 NOTE — PROGRESS NOTES
Vascular Surgery  Presurgical H&P    Patient:Antonio Cantu  MRN:[id number]  Primary care physician:Joann Carmona P.A.-C.    Vascular Consultant: Brian Cameron MD    11/7/2023  _____________________________________________________    Chief Complaint/Reason for Visit:     PAOD      History of Present Illness:   Antonio Cantu is a 63 y.o. year old male with known history of bilateral lower extremity peripheral arterial occlusive disease.  Patient has already undergone revascularization of the left leg which failed and subsequently a left below-knee amputation which was performed on 4/11/2022 at [facility].  Patient has a prosthesis for the left leg and has been doing fairly well with that.  About a month ago he started to experience worsening right foot pain and he has developed a small wound on the right fifth toe.  No gross evidence of infection at this point.  No recent arterial testing although his previous testing in April 2022 showed right SFA and popliteal occlusion with three-vessel tibial runoff.  Patient is still smoking but less than a pack a day; we discussed smoking cessation.    Past Medical History:     Past Medical History:   Diagnosis Date    Adenomatous colon polyp 11/2018    multiple, recall colonoscopy in 3 years    Allergic rhinitis 02/03/2022    Amputation of left great toe (HCC) 09/15/2021    BPH with obstruction/lower urinary tract symptoms     Cervical spine fracture ([facility])     Chronic bronchitis ([facility])     Current every day smoker 06/19/2018    Erectile dysfunction 06/19/2018    Fracture of vertebra without spinal injury 02/03/2022    Gangrene of toe ([facility]) 09/15/2021    Hammer toe     Herpes     Hyperplasia of prostate 02/03/2022    Hypertension     Insomnia     Left below-knee amputee (HCC) 11/09/2022    Mixed hyperlipidemia 09/06/2018    Osteomyelitis ([facility]) 02/08/2022    Added automatically from request for surgery [id number]    Polycythemia 09/06/2018    Prehypertension 06/19/2018    Seasonal  allergic rhinitis     Sleep apnea     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    Trimalleolar fracture of ankle, closed 11/16/2021    Added automatically from request for surgery [id number]     Past Surgical History:     Past Surgical History:   Procedure Laterality Date    PB AMPUTATION TOE,MT-P JT Left 2/11/2022    Procedure: AMPUTATION, TOE, SECOND;  Surgeon: Raymond Knox M.D.;  Location: [facility];  Service: Orthopedics    PB ARTHRODESIS,ANKLE,OPEN Left 11/19/2021    Procedure: FUSION, JOINT, ANKLE;  Surgeon: Raymond Knox M.D.;  Location[facility];  Service: Orthopedics    FEMORAL POPLITEAL BYPASS Left 3/14/2021    Procedure: Revision Left Femoral to Popliteal Bypass;  Surgeon: Brian Cameron M.D.;  Location: [facility];  Service: Vascular    ANGIOGRAM Left 3/14/2021    Procedure: ANGIOGRAM;  Surgeon: Brian Cameron M.D.;  Location: [facility];  Service: Vascular    THROMBECTOMY Left 3/14/2021    Procedure: THROMBECTOMY;  Surgeon: Brian Cameron M.D.;  Location: [facility];  Service: Vascular    FEMORAL POPLITEAL BYPASS Left 3/13/2021    Procedure: CREATION, BYPASS, ARTERIAL, FEMORAL TO POPLITEAL, USING GRAFT;  Surgeon: Brian Cameron M.D.;  Location: [facility];  Service: Vascular    ANGIOGRAM Left 3/13/2021    Procedure: ANGIOGRAM;  Surgeon: Brian Cameron M.D.;  Location: [facility];  Service: Vascular    IRRIGATION & DEBRIDEMENT GENERAL Left 3/13/2021    Procedure: IRRIGATION AND DEBRIDEMENT, WOUND;  Surgeon: Brian Cameron M.D.;  Location: [facility];  Service: Vascular    HAMMERTOE CORRECTION       Allergies:   No Known Allergies  Medications:     Outpatient Encounter Medications as of 11/7/2023   Medication Sig Dispense Refill    atorvastatin (LIPITOR) 40 MG Tab Take 1 Tablet by mouth every day. 90 Tablet 3    aspirin (ASA) 81 MG Chew Tab  chewable tablet Chew 1 Tablet every day. 100 Tablet 3    gabapentin (NEURONTIN) 300 MG Cap Take 1 Capsule by mouth 3 times a day. 270 Capsule 1    ibuprofen (MOTRIN) 200 MG Tab Take 3-4 Tablets by mouth 3 times a day as needed (Moderate Pain). 3 to 4 tablets = 600 to 800 mg.       No facility-administered encounter medications on file as of 11/7/2023.     Social History:     Social History     Socioeconomic History    Marital status:      Spouse name: Not on file    Number of children: Not on file    Years of education: Not on file    Highest education level: Not on file   Occupational History    Not on file   Tobacco Use    Smoking status: Every Day     Current packs/day: 1.00     Average packs/day: 1 pack/day for 45.0 years (45.0 ttl pk-yrs)     Types: Cigarettes    Smokeless tobacco: Never    Tobacco comments:     20 cig/day   Vaping Use    Vaping Use: Some days    Substances: THC, CBD   Substance and Sexual Activity    Alcohol use: Yes     Alcohol/week: 3.0 - 3.6 oz     Types: 5 - 6 Cans of beer per week     Comment: 1-6 beers/day     Drug use: Yes     Types: Marijuana, Inhaled     Comment: Occ    Sexual activity: Yes     Partners: Female   Other Topics Concern    Not on file   Social History Narrative    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     Diet: typically eats one meal per day, just dinner. Does drink 1-6 beers per night, sometimes up to 10 beers a night.    Not . 2 daughters, 2 grandchildren.     Hobbies: enjoys camping, golfing, bicycle.      Social Determinants of Health     Financial Resource Strain: Not on file   Food Insecurity: Not on file   Transportation Needs: Not on file   Physical Activity: Not on file   Stress: Not on file   Social Connections: Not on file   Intimate Partner Violence: Not on file   Housing Stability: Not on file      Social History     Tobacco Use   Smoking Status Every Day    Current packs/day: 1.00    Average packs/day: 1  "pack/day for 45.0 years (45.0 ttl pk-yrs)    Types: Cigarettes   Smokeless Tobacco Never   Tobacco Comments    20 cig/day     Social History     Substance and Sexual Activity   Alcohol Use Yes    Alcohol/week: 3.0 - 3.6 oz    Types: 5 - 6 Cans of beer per week    Comment: 1-6 beers/day      Social History     Substance and Sexual Activity   Drug Use Yes    Types: Marijuana, Inhaled    Comment: Occ      Family History:     Family History   Problem Relation Age of Onset    Arthritis Other     Diabetes Mother     Cancer Father         colon or prostate     Lung Disease Father         emphysema     Diabetes Sister     Diabetes Brother     Diabetes Maternal Grandmother     Diabetes Paternal Grandfather     No Known Problems Daughter     No Known Problems Daughter     No Known Problems Grandchild     No Known Problems Grandchild        Review of Systems:   Constitutional: Negative for fever or chills  HENT:   Negative for hearing loss or tinnitus    Eyes:    Negative for blurred vision or loss of vision  Respiratory:  Negative for cough or hemoptysis  Cardiac:  Negative for chest pain or palpitations  Vascular:  Positive for claudication, Positive for rest pain  Gastrointestinal: Negative for vomiting or abdominal pain     Negative for hematochezia or melena   Genitourinary: Negative for dysuria or hematuria   Musculoskeletal: Negative for myalgias or acute joint pain  Skin:   Negative for itching or rash  Neurological:  Negative for dizziness or headaches     Negative for speech disturbance     Negative for extremity weakness or paresthesias  Endo/Heme:  Negative for easy bruising or bleeding         Exam:   BP 94/70 (BP Location: Left arm, Patient Position: Sitting, BP Cuff Size: Adult)   Pulse 81   Temp 37.2 °C (98.9 °F) (Temporal)   Ht 1.905 m (6' 3\")   Wt 93.6 kg (206 lb 5.6 oz)   SpO2 95%   BMI 25.79 kg/m²     Constitutional: Alert, oriented, no acute distress  HEENT:  Normocephalic and atraumatic, " EOMI  Neck:   Supple, no JVD,   Cardiovascular: Regular rate and rhythm,   Pulmonary:  Good air entry bilaterally,    Abdominal:  Soft, non-tender, non-distended  Musculoskeletal: No tenderness, no deformity  Neurological:  CN II-XII grossly intact, no focal deficits  Skin:   Skin is warm and dry. No rash noted.  Vascular exam:    RUE: warm, cap refill<3 seconds, no edema, no tissue loss,   LUE: warm, cap refill<3 seconds, no edema, no tissue loss,   RLE: warm, cap refill<3 seconds, no edema, small ulcer of the right fifth toe, nonpalpable pulses  LLE: Healed left BKA      Imaging:   previous testing in April 2022 showed right SFA and popliteal occlusion with three-vessel tibial runoff.      Assessment and Plan:   - PAOD with severe RLE arterial insufficiency, rest pain and wound of the 5th toe    Patient presents with severe arterial insufficiency of the right foot with rest pain and a small ulcer of the right fifth toe.  Patient has known right SFA and popliteal occlusion.  It is currently not known what the status of his tibial runoff is however regardless the patient's only option for revascularization will be a bypass surgery.  Fortunately bedside ultrasound shows the saphenous vein appears to be adequate.  Plan at this point will be to schedule the patient for a right lower extremity bypass and we will use intraoperative angiogram to confirm the distal target which could either be the below-knee popliteal artery or potentially one of the tibial arteries depending on the results of the angiogram.  I explained to him that there is a chance we may not identify distal target and at that point a bypass would not be possible and he would be facing a right below the knee amputation.  Patient is willing to undergo a right BKA at the time of surgery if the arterial disease is determined to be unreconstructable.  Once again the patient is aware that he may end up in recovery with either a bypass or a below-knee  amputation and he is okay with that.  Procedure and risks discussed, questions answered and he agrees to proceed.    Still smoking less than a pack a day, cessation encouraged  _____________________________________________________  Brian Cameron MD  [facility]  [phone]  [address], [place] [address]

## 2023-11-09 ENCOUNTER — TELEPHONE (OUTPATIENT)
Dept: VASCULAR SURGERY | Facility: MEDICAL CENTER | Age: 63
End: 2023-11-09
Payer: OTHER MISCELLANEOUS

## 2023-11-09 NOTE — TELEPHONE ENCOUNTER
I called patient and scheduled procedure with Dr. Cameron for 11/29 @ 11:00 am. Patient is to check in @ 9:00 am in the [facility] 1st floor.     Patient has been instructed nothing to eat or drink 8 hours prior and he will need a  once discharged.     I called patient and notified him of time change of procedure. Patient is to check in @ 5:30 am in the [facility] [place].

## 2023-11-22 ENCOUNTER — APPOINTMENT (OUTPATIENT)
Dept: ADMISSIONS | Facility: MEDICAL CENTER | Age: 63
DRG: 254 | End: 2023-11-22
Attending: SURGERY
Payer: OTHER MISCELLANEOUS

## 2023-11-28 ENCOUNTER — PRE-ADMISSION TESTING (OUTPATIENT)
Dept: ADMISSIONS | Facility: MEDICAL CENTER | Age: 63
DRG: 254 | End: 2023-11-28
Attending: SURGERY
Payer: OTHER MISCELLANEOUS

## 2023-11-29 ENCOUNTER — APPOINTMENT (OUTPATIENT)
Dept: RADIOLOGY | Facility: MEDICAL CENTER | Age: 63
DRG: 254 | End: 2023-11-29
Attending: SURGERY
Payer: OTHER MISCELLANEOUS

## 2023-11-29 ENCOUNTER — ANESTHESIA EVENT (OUTPATIENT)
Dept: SURGERY | Facility: MEDICAL CENTER | Age: 63
DRG: 254 | End: 2023-11-29
Payer: OTHER MISCELLANEOUS

## 2023-11-29 ENCOUNTER — HOSPITAL ENCOUNTER (INPATIENT)
Facility: MEDICAL CENTER | Age: 63
LOS: 1 days | DRG: 254 | End: 2023-11-30
Attending: SURGERY | Admitting: SURGERY
Payer: OTHER MISCELLANEOUS

## 2023-11-29 DIAGNOSIS — I73.9 PAD (PERIPHERAL ARTERY DISEASE) (HCC): ICD-10-CM

## 2023-11-29 DIAGNOSIS — G89.18 POSTOPERATIVE PAIN: ICD-10-CM

## 2023-11-29 LAB
ABO GROUP BLD: NORMAL
ALBUMIN SERPL BCP-MCNC: 4.1 G/DL (ref 3.2–4.9)
ALBUMIN/GLOB SERPL: 1.4 G/DL
ALP SERPL-CCNC: 91 U/L (ref 30–99)
ALT SERPL-CCNC: 15 U/L (ref 2–50)
ANION GAP SERPL CALC-SCNC: 12 MMOL/L (ref 7–16)
APTT PPP: 24.7 SEC (ref 24.7–36)
AST SERPL-CCNC: 19 U/L (ref 12–45)
BILIRUB SERPL-MCNC: 0.7 MG/DL (ref 0.1–1.5)
BLD GP AB SCN SERPL QL: NORMAL
BUN SERPL-MCNC: 5 MG/DL (ref 8–22)
CALCIUM ALBUM COR SERPL-MCNC: 10.2 MG/DL (ref 8.5–10.5)
CALCIUM SERPL-MCNC: 10.3 MG/DL (ref 8.5–10.5)
CHLORIDE SERPL-SCNC: 99 MMOL/L (ref 96–112)
CO2 SERPL-SCNC: 23 MMOL/L (ref 20–33)
CREAT SERPL-MCNC: 0.52 MG/DL (ref 0.5–1.4)
EKG IMPRESSION: NORMAL
ERYTHROCYTE [DISTWIDTH] IN BLOOD BY AUTOMATED COUNT: 44.3 FL (ref 35.9–50)
GFR SERPLBLD CREATININE-BSD FMLA CKD-EPI: 112 ML/MIN/1.73 M 2
GLOBULIN SER CALC-MCNC: 2.9 G/DL (ref 1.9–3.5)
GLUCOSE SERPL-MCNC: 88 MG/DL (ref 65–99)
HCT VFR BLD AUTO: 52.4 % (ref 42–52)
HGB BLD-MCNC: 18.2 G/DL (ref 14–18)
INR PPP: 0.95 (ref 0.87–1.13)
MCH RBC QN AUTO: 31.3 PG (ref 27–33)
MCHC RBC AUTO-ENTMCNC: 34.7 G/DL (ref 32.3–36.5)
MCV RBC AUTO: 90.2 FL (ref 81.4–97.8)
PLATELET # BLD AUTO: 301 K/UL (ref 164–446)
PMV BLD AUTO: 9 FL (ref 9–12.9)
POTASSIUM SERPL-SCNC: 4.3 MMOL/L (ref 3.6–5.5)
PROT SERPL-MCNC: 7 G/DL (ref 6–8.2)
PROTHROMBIN TIME: 12.8 SEC (ref 12–14.6)
RBC # BLD AUTO: 5.81 M/UL (ref 4.7–6.1)
RH BLD: NORMAL
SODIUM SERPL-SCNC: 134 MMOL/L (ref 135–145)
WBC # BLD AUTO: 11 K/UL (ref 4.8–10.8)

## 2023-11-29 PROCEDURE — 700105 HCHG RX REV CODE 258: Performed by: SURGERY

## 2023-11-29 PROCEDURE — 04CK0ZZ EXTIRPATION OF MATTER FROM RIGHT FEMORAL ARTERY, OPEN APPROACH: ICD-10-PCS | Performed by: SURGERY

## 2023-11-29 PROCEDURE — A9270 NON-COVERED ITEM OR SERVICE: HCPCS | Performed by: ANESTHESIOLOGY

## 2023-11-29 PROCEDURE — 86901 BLOOD TYPING SEROLOGIC RH(D): CPT

## 2023-11-29 PROCEDURE — C1894 INTRO/SHEATH, NON-LASER: HCPCS | Performed by: SURGERY

## 2023-11-29 PROCEDURE — 37221 PR REVASCULARIZE ILIAC ARTERY,ANGIOPLASTY/ST*: CPT | Mod: 51,RT | Performed by: SURGERY

## 2023-11-29 PROCEDURE — 700102 HCHG RX REV CODE 250 W/ 637 OVERRIDE(OP): Performed by: SURGERY

## 2023-11-29 PROCEDURE — 75774 ARTERY X-RAY EACH VESSEL: CPT | Mod: RT

## 2023-11-29 PROCEDURE — 700117 HCHG RX CONTRAST REV CODE 255: Performed by: SURGERY

## 2023-11-29 PROCEDURE — 047H3EZ DILATION OF RIGHT EXTERNAL ILIAC ARTERY WITH TWO INTRALUMINAL DEVICES, PERCUTANEOUS APPROACH: ICD-10-PCS | Performed by: SURGERY

## 2023-11-29 PROCEDURE — C1769 GUIDE WIRE: HCPCS | Performed by: SURGERY

## 2023-11-29 PROCEDURE — 93005 ELECTROCARDIOGRAM TRACING: CPT | Performed by: SURGERY

## 2023-11-29 PROCEDURE — 86900 BLOOD TYPING SEROLOGIC ABO: CPT

## 2023-11-29 PROCEDURE — 160035 HCHG PACU - 1ST 60 MINS PHASE I: Performed by: SURGERY

## 2023-11-29 PROCEDURE — 700105 HCHG RX REV CODE 258: Performed by: ANESTHESIOLOGY

## 2023-11-29 PROCEDURE — C1760 CLOSURE DEV, VASC: HCPCS | Performed by: SURGERY

## 2023-11-29 PROCEDURE — 35371 RECHANNELING OF ARTERY: CPT | Mod: RT | Performed by: SURGERY

## 2023-11-29 PROCEDURE — C1876 STENT, NON-COA/NON-COV W/DEL: HCPCS | Performed by: SURGERY

## 2023-11-29 PROCEDURE — 86850 RBC ANTIBODY SCREEN: CPT

## 2023-11-29 PROCEDURE — 160002 HCHG RECOVERY MINUTES (STAT): Performed by: SURGERY

## 2023-11-29 PROCEDURE — 80053 COMPREHEN METABOLIC PANEL: CPT

## 2023-11-29 PROCEDURE — 700101 HCHG RX REV CODE 250: Performed by: SURGERY

## 2023-11-29 PROCEDURE — C1725 CATH, TRANSLUMIN NON-LASER: HCPCS | Performed by: SURGERY

## 2023-11-29 PROCEDURE — 160009 HCHG ANES TIME/MIN: Performed by: SURGERY

## 2023-11-29 PROCEDURE — 770001 HCHG ROOM/CARE - MED/SURG/GYN PRIV*

## 2023-11-29 PROCEDURE — C1887 CATHETER, GUIDING: HCPCS | Performed by: SURGERY

## 2023-11-29 PROCEDURE — 160048 HCHG OR STATISTICAL LEVEL 1-5: Performed by: SURGERY

## 2023-11-29 PROCEDURE — 160029 HCHG SURGERY MINUTES - 1ST 30 MINS LEVEL 4: Performed by: SURGERY

## 2023-11-29 PROCEDURE — 700111 HCHG RX REV CODE 636 W/ 250 OVERRIDE (IP): Mod: JZ | Performed by: ANESTHESIOLOGY

## 2023-11-29 PROCEDURE — 110454 HCHG SHELL REV 250: Performed by: SURGERY

## 2023-11-29 PROCEDURE — 110371 HCHG SHELL REV 272: Performed by: SURGERY

## 2023-11-29 PROCEDURE — 700102 HCHG RX REV CODE 250 W/ 637 OVERRIDE(OP): Performed by: ANESTHESIOLOGY

## 2023-11-29 PROCEDURE — 93010 ELECTROCARDIOGRAM REPORT: CPT | Performed by: INTERNAL MEDICINE

## 2023-11-29 PROCEDURE — 160041 HCHG SURGERY MINUTES - EA ADDL 1 MIN LEVEL 4: Performed by: SURGERY

## 2023-11-29 PROCEDURE — 700111 HCHG RX REV CODE 636 W/ 250 OVERRIDE (IP): Performed by: SURGERY

## 2023-11-29 PROCEDURE — 75630 X-RAY AORTA LEG ARTERIES: CPT | Mod: 26,59 | Performed by: SURGERY

## 2023-11-29 PROCEDURE — 85730 THROMBOPLASTIN TIME PARTIAL: CPT

## 2023-11-29 PROCEDURE — 85610 PROTHROMBIN TIME: CPT

## 2023-11-29 PROCEDURE — 85027 COMPLETE CBC AUTOMATED: CPT

## 2023-11-29 PROCEDURE — A9270 NON-COVERED ITEM OR SERVICE: HCPCS | Performed by: SURGERY

## 2023-11-29 PROCEDURE — 700101 HCHG RX REV CODE 250: Performed by: ANESTHESIOLOGY

## 2023-11-29 DEVICE — IMPLANTABLE DEVICE: Type: IMPLANTABLE DEVICE | Site: GROIN | Status: FUNCTIONAL

## 2023-11-29 DEVICE — DEVICE CLSR 6FR HMST IMPL SLF STS PLUS ANGIOSEAL (10EA/CA): Type: IMPLANTABLE DEVICE | Site: GROIN | Status: FUNCTIONAL

## 2023-11-29 RX ORDER — HYDROMORPHONE HYDROCHLORIDE 1 MG/ML
0.5 INJECTION, SOLUTION INTRAMUSCULAR; INTRAVENOUS; SUBCUTANEOUS
Status: DISCONTINUED | OUTPATIENT
Start: 2023-11-29 | End: 2023-11-30 | Stop reason: HOSPADM

## 2023-11-29 RX ORDER — CLOPIDOGREL BISULFATE 75 MG/1
300 TABLET ORAL ONCE
Status: COMPLETED | OUTPATIENT
Start: 2023-11-29 | End: 2023-11-29

## 2023-11-29 RX ORDER — DEXAMETHASONE SODIUM PHOSPHATE 4 MG/ML
INJECTION, SOLUTION INTRA-ARTICULAR; INTRALESIONAL; INTRAMUSCULAR; INTRAVENOUS; SOFT TISSUE PRN
Status: DISCONTINUED | OUTPATIENT
Start: 2023-11-29 | End: 2023-11-29 | Stop reason: SURG

## 2023-11-29 RX ORDER — ACETAMINOPHEN 325 MG/1
650 TABLET ORAL EVERY 6 HOURS
Status: DISCONTINUED | OUTPATIENT
Start: 2023-11-29 | End: 2023-11-30 | Stop reason: HOSPADM

## 2023-11-29 RX ORDER — IBUPROFEN 800 MG/1
800 TABLET ORAL 3 TIMES DAILY PRN
Status: DISCONTINUED | OUTPATIENT
Start: 2023-12-02 | End: 2023-11-30 | Stop reason: HOSPADM

## 2023-11-29 RX ORDER — OXYCODONE HCL 5 MG/5 ML
10 SOLUTION, ORAL ORAL
Status: COMPLETED | OUTPATIENT
Start: 2023-11-29 | End: 2023-11-29

## 2023-11-29 RX ORDER — ONDANSETRON 2 MG/ML
4 INJECTION INTRAMUSCULAR; INTRAVENOUS
Status: DISCONTINUED | OUTPATIENT
Start: 2023-11-29 | End: 2023-11-29 | Stop reason: HOSPADM

## 2023-11-29 RX ORDER — HALOPERIDOL 5 MG/ML
1 INJECTION INTRAMUSCULAR
Status: DISCONTINUED | OUTPATIENT
Start: 2023-11-29 | End: 2023-11-29 | Stop reason: HOSPADM

## 2023-11-29 RX ORDER — EPHEDRINE SULFATE 50 MG/ML
5 INJECTION, SOLUTION INTRAVENOUS
Status: DISCONTINUED | OUTPATIENT
Start: 2023-11-29 | End: 2023-11-29 | Stop reason: HOSPADM

## 2023-11-29 RX ORDER — SCOLOPAMINE TRANSDERMAL SYSTEM 1 MG/1
1 PATCH, EXTENDED RELEASE TRANSDERMAL
Status: DISCONTINUED | OUTPATIENT
Start: 2023-11-29 | End: 2023-11-30 | Stop reason: HOSPADM

## 2023-11-29 RX ORDER — SODIUM CHLORIDE, SODIUM LACTATE, POTASSIUM CHLORIDE, CALCIUM CHLORIDE 600; 310; 30; 20 MG/100ML; MG/100ML; MG/100ML; MG/100ML
INJECTION, SOLUTION INTRAVENOUS
Status: DISCONTINUED | OUTPATIENT
Start: 2023-11-29 | End: 2023-11-29 | Stop reason: SURG

## 2023-11-29 RX ORDER — LABETALOL HYDROCHLORIDE 5 MG/ML
10 INJECTION, SOLUTION INTRAVENOUS EVERY 4 HOURS PRN
Status: DISCONTINUED | OUTPATIENT
Start: 2023-11-29 | End: 2023-11-30 | Stop reason: HOSPADM

## 2023-11-29 RX ORDER — LIDOCAINE HYDROCHLORIDE 10 MG/ML
INJECTION, SOLUTION EPIDURAL; INFILTRATION; INTRACAUDAL; PERINEURAL
Status: DISPENSED
Start: 2023-11-29 | End: 2023-11-29

## 2023-11-29 RX ORDER — EPHEDRINE SULFATE 50 MG/ML
INJECTION, SOLUTION INTRAVENOUS PRN
Status: DISCONTINUED | OUTPATIENT
Start: 2023-11-29 | End: 2023-11-29 | Stop reason: SURG

## 2023-11-29 RX ORDER — KETOROLAC TROMETHAMINE 30 MG/ML
30 INJECTION, SOLUTION INTRAMUSCULAR; INTRAVENOUS EVERY 6 HOURS
Status: DISCONTINUED | OUTPATIENT
Start: 2023-11-29 | End: 2023-11-30 | Stop reason: HOSPADM

## 2023-11-29 RX ORDER — ALBUTEROL SULFATE 2.5 MG/3ML
2.5 SOLUTION RESPIRATORY (INHALATION)
Status: DISCONTINUED | OUTPATIENT
Start: 2023-11-29 | End: 2023-11-29 | Stop reason: HOSPADM

## 2023-11-29 RX ORDER — ONDANSETRON 2 MG/ML
4 INJECTION INTRAMUSCULAR; INTRAVENOUS EVERY 4 HOURS PRN
Status: DISCONTINUED | OUTPATIENT
Start: 2023-11-29 | End: 2023-11-30 | Stop reason: HOSPADM

## 2023-11-29 RX ORDER — ACETAMINOPHEN 325 MG/1
650 TABLET ORAL EVERY 6 HOURS PRN
Status: DISCONTINUED | OUTPATIENT
Start: 2023-12-04 | End: 2023-11-30 | Stop reason: HOSPADM

## 2023-11-29 RX ORDER — HEPARIN SODIUM 1000 [USP'U]/ML
INJECTION, SOLUTION INTRAVENOUS; SUBCUTANEOUS PRN
Status: DISCONTINUED | OUTPATIENT
Start: 2023-11-29 | End: 2023-11-29 | Stop reason: SURG

## 2023-11-29 RX ORDER — OXYCODONE HCL 5 MG/5 ML
5 SOLUTION, ORAL ORAL
Status: COMPLETED | OUTPATIENT
Start: 2023-11-29 | End: 2023-11-29

## 2023-11-29 RX ORDER — HYDROMORPHONE HYDROCHLORIDE 1 MG/ML
0.2 INJECTION, SOLUTION INTRAMUSCULAR; INTRAVENOUS; SUBCUTANEOUS
Status: DISCONTINUED | OUTPATIENT
Start: 2023-11-29 | End: 2023-11-29 | Stop reason: HOSPADM

## 2023-11-29 RX ORDER — LIDOCAINE HYDROCHLORIDE 20 MG/ML
INJECTION, SOLUTION EPIDURAL; INFILTRATION; INTRACAUDAL; PERINEURAL PRN
Status: DISCONTINUED | OUTPATIENT
Start: 2023-11-29 | End: 2023-11-29 | Stop reason: SURG

## 2023-11-29 RX ORDER — IODIXANOL 270 MG/ML
INJECTION, SOLUTION INTRAVASCULAR
Status: DISCONTINUED | OUTPATIENT
Start: 2023-11-29 | End: 2023-11-29 | Stop reason: HOSPADM

## 2023-11-29 RX ORDER — HYDROMORPHONE HYDROCHLORIDE 1 MG/ML
0.1 INJECTION, SOLUTION INTRAMUSCULAR; INTRAVENOUS; SUBCUTANEOUS
Status: DISCONTINUED | OUTPATIENT
Start: 2023-11-29 | End: 2023-11-29 | Stop reason: HOSPADM

## 2023-11-29 RX ORDER — CEFAZOLIN SODIUM 1 G/3ML
INJECTION, POWDER, FOR SOLUTION INTRAMUSCULAR; INTRAVENOUS PRN
Status: DISCONTINUED | OUTPATIENT
Start: 2023-11-29 | End: 2023-11-29 | Stop reason: SURG

## 2023-11-29 RX ORDER — HYDROMORPHONE HYDROCHLORIDE 1 MG/ML
0.4 INJECTION, SOLUTION INTRAMUSCULAR; INTRAVENOUS; SUBCUTANEOUS
Status: DISCONTINUED | OUTPATIENT
Start: 2023-11-29 | End: 2023-11-29 | Stop reason: HOSPADM

## 2023-11-29 RX ORDER — ASPIRIN 81 MG/1
81 TABLET ORAL DAILY
Status: DISCONTINUED | OUTPATIENT
Start: 2023-11-30 | End: 2023-11-30 | Stop reason: HOSPADM

## 2023-11-29 RX ORDER — PHENYLEPHRINE HYDROCHLORIDE 10 MG/ML
INJECTION, SOLUTION INTRAMUSCULAR; INTRAVENOUS; SUBCUTANEOUS PRN
Status: DISCONTINUED | OUTPATIENT
Start: 2023-11-29 | End: 2023-11-29 | Stop reason: SURG

## 2023-11-29 RX ORDER — OXYCODONE HYDROCHLORIDE 10 MG/1
10 TABLET ORAL
Status: DISCONTINUED | OUTPATIENT
Start: 2023-11-29 | End: 2023-11-30 | Stop reason: HOSPADM

## 2023-11-29 RX ORDER — SODIUM CHLORIDE 9 MG/ML
INJECTION, SOLUTION INTRAVENOUS CONTINUOUS
Status: ACTIVE | OUTPATIENT
Start: 2023-11-29 | End: 2023-11-29

## 2023-11-29 RX ORDER — SODIUM CHLORIDE, SODIUM LACTATE, POTASSIUM CHLORIDE, CALCIUM CHLORIDE 600; 310; 30; 20 MG/100ML; MG/100ML; MG/100ML; MG/100ML
INJECTION, SOLUTION INTRAVENOUS CONTINUOUS
Status: DISCONTINUED | OUTPATIENT
Start: 2023-11-29 | End: 2023-11-29 | Stop reason: HOSPADM

## 2023-11-29 RX ORDER — METOPROLOL TARTRATE 1 MG/ML
1 INJECTION, SOLUTION INTRAVENOUS
Status: DISCONTINUED | OUTPATIENT
Start: 2023-11-29 | End: 2023-11-29 | Stop reason: HOSPADM

## 2023-11-29 RX ORDER — HALOPERIDOL 5 MG/ML
1 INJECTION INTRAMUSCULAR EVERY 6 HOURS PRN
Status: DISCONTINUED | OUTPATIENT
Start: 2023-11-29 | End: 2023-11-30 | Stop reason: HOSPADM

## 2023-11-29 RX ORDER — CLOPIDOGREL BISULFATE 75 MG/1
75 TABLET ORAL DAILY
Status: DISCONTINUED | OUTPATIENT
Start: 2023-11-30 | End: 2023-11-30 | Stop reason: HOSPADM

## 2023-11-29 RX ORDER — DIPHENHYDRAMINE HYDROCHLORIDE 50 MG/ML
25 INJECTION INTRAMUSCULAR; INTRAVENOUS EVERY 6 HOURS PRN
Status: DISCONTINUED | OUTPATIENT
Start: 2023-11-29 | End: 2023-11-30 | Stop reason: HOSPADM

## 2023-11-29 RX ORDER — CLONIDINE HYDROCHLORIDE 0.1 MG/1
0.1 TABLET ORAL
Status: DISCONTINUED | OUTPATIENT
Start: 2023-11-29 | End: 2023-11-30 | Stop reason: HOSPADM

## 2023-11-29 RX ORDER — COVID-19 ANTIGEN TEST
660 KIT MISCELLANEOUS 2 TIMES DAILY PRN
COMMUNITY
End: 2024-01-03

## 2023-11-29 RX ORDER — OXYCODONE HYDROCHLORIDE 5 MG/1
5 TABLET ORAL
Status: DISCONTINUED | OUTPATIENT
Start: 2023-11-29 | End: 2023-11-30 | Stop reason: HOSPADM

## 2023-11-29 RX ORDER — HYDRALAZINE HYDROCHLORIDE 20 MG/ML
10 INJECTION INTRAMUSCULAR; INTRAVENOUS EVERY 4 HOURS PRN
Status: DISCONTINUED | OUTPATIENT
Start: 2023-11-29 | End: 2023-11-30 | Stop reason: HOSPADM

## 2023-11-29 RX ORDER — ATORVASTATIN CALCIUM 40 MG/1
40 TABLET, FILM COATED ORAL DAILY
Status: DISCONTINUED | OUTPATIENT
Start: 2023-11-29 | End: 2023-11-30 | Stop reason: HOSPADM

## 2023-11-29 RX ORDER — SODIUM CHLORIDE, SODIUM LACTATE, POTASSIUM CHLORIDE, CALCIUM CHLORIDE 600; 310; 30; 20 MG/100ML; MG/100ML; MG/100ML; MG/100ML
INJECTION, SOLUTION INTRAVENOUS CONTINUOUS
Status: ACTIVE | OUTPATIENT
Start: 2023-11-29 | End: 2023-11-29

## 2023-11-29 RX ORDER — ONDANSETRON 2 MG/ML
INJECTION INTRAMUSCULAR; INTRAVENOUS PRN
Status: DISCONTINUED | OUTPATIENT
Start: 2023-11-29 | End: 2023-11-29 | Stop reason: SURG

## 2023-11-29 RX ORDER — DOCUSATE SODIUM 100 MG/1
100 CAPSULE, LIQUID FILLED ORAL 2 TIMES DAILY
Status: DISCONTINUED | OUTPATIENT
Start: 2023-11-29 | End: 2023-11-30 | Stop reason: HOSPADM

## 2023-11-29 RX ORDER — DEXAMETHASONE SODIUM PHOSPHATE 4 MG/ML
4 INJECTION, SOLUTION INTRA-ARTICULAR; INTRALESIONAL; INTRAMUSCULAR; INTRAVENOUS; SOFT TISSUE
Status: DISCONTINUED | OUTPATIENT
Start: 2023-11-29 | End: 2023-11-30 | Stop reason: HOSPADM

## 2023-11-29 RX ORDER — HEPARIN SODIUM 5000 [USP'U]/ML
5000 INJECTION, SOLUTION INTRAVENOUS; SUBCUTANEOUS EVERY 8 HOURS
Status: DISCONTINUED | OUTPATIENT
Start: 2023-11-30 | End: 2023-11-30 | Stop reason: HOSPADM

## 2023-11-29 RX ORDER — HYDRALAZINE HYDROCHLORIDE 20 MG/ML
5 INJECTION INTRAMUSCULAR; INTRAVENOUS
Status: DISCONTINUED | OUTPATIENT
Start: 2023-11-29 | End: 2023-11-29

## 2023-11-29 RX ORDER — CLONIDINE HYDROCHLORIDE 0.1 MG/1
0.2 TABLET ORAL
Status: DISCONTINUED | OUTPATIENT
Start: 2023-11-29 | End: 2023-11-30 | Stop reason: HOSPADM

## 2023-11-29 RX ORDER — ROCURONIUM BROMIDE 10 MG/ML
INJECTION, SOLUTION INTRAVENOUS PRN
Status: DISCONTINUED | OUTPATIENT
Start: 2023-11-29 | End: 2023-11-29 | Stop reason: SURG

## 2023-11-29 RX ADMIN — OXYCODONE HYDROCHLORIDE 10 MG: 5 SOLUTION ORAL at 11:21

## 2023-11-29 RX ADMIN — SUGAMMADEX 200 MG: 100 INJECTION, SOLUTION INTRAVENOUS at 10:50

## 2023-11-29 RX ADMIN — SODIUM CHLORIDE: 9 INJECTION, SOLUTION INTRAVENOUS at 12:54

## 2023-11-29 RX ADMIN — CLOPIDOGREL BISULFATE 300 MG: 75 TABLET ORAL at 13:41

## 2023-11-29 RX ADMIN — HEPARIN SODIUM 4000 UNITS: 1000 INJECTION, SOLUTION INTRAVENOUS; SUBCUTANEOUS at 09:05

## 2023-11-29 RX ADMIN — SODIUM CHLORIDE, POTASSIUM CHLORIDE, SODIUM LACTATE AND CALCIUM CHLORIDE: 600; 310; 30; 20 INJECTION, SOLUTION INTRAVENOUS at 07:42

## 2023-11-29 RX ADMIN — HEPARIN SODIUM 4000 UNITS: 1000 INJECTION, SOLUTION INTRAVENOUS; SUBCUTANEOUS at 09:36

## 2023-11-29 RX ADMIN — FENTANYL CITRATE 75 MCG: 50 INJECTION, SOLUTION INTRAMUSCULAR; INTRAVENOUS at 08:33

## 2023-11-29 RX ADMIN — ONDANSETRON 4 MG: 2 INJECTION INTRAMUSCULAR; INTRAVENOUS at 10:41

## 2023-11-29 RX ADMIN — PROPOFOL 200 MG: 10 INJECTION, EMULSION INTRAVENOUS at 07:45

## 2023-11-29 RX ADMIN — CEFAZOLIN 2 G: 1 INJECTION, POWDER, FOR SOLUTION INTRAMUSCULAR; INTRAVENOUS at 07:45

## 2023-11-29 RX ADMIN — ACETAMINOPHEN 650 MG: 325 TABLET, FILM COATED ORAL at 20:52

## 2023-11-29 RX ADMIN — FENTANYL CITRATE 25 MCG: 50 INJECTION, SOLUTION INTRAMUSCULAR; INTRAVENOUS at 11:37

## 2023-11-29 RX ADMIN — KETOROLAC TROMETHAMINE 30 MG: 30 INJECTION, SOLUTION INTRAMUSCULAR; INTRAVENOUS at 20:52

## 2023-11-29 RX ADMIN — FENTANYL CITRATE 25 MCG: 50 INJECTION, SOLUTION INTRAMUSCULAR; INTRAVENOUS at 11:46

## 2023-11-29 RX ADMIN — ATORVASTATIN CALCIUM 40 MG: 40 TABLET, FILM COATED ORAL at 17:03

## 2023-11-29 RX ADMIN — ROCURONIUM BROMIDE 100 MG: 50 INJECTION, SOLUTION INTRAVENOUS at 07:45

## 2023-11-29 RX ADMIN — HEPARIN SODIUM 9000 UNITS: 1000 INJECTION, SOLUTION INTRAVENOUS; SUBCUTANEOUS at 08:33

## 2023-11-29 RX ADMIN — ACETAMINOPHEN 650 MG: 325 TABLET, FILM COATED ORAL at 13:40

## 2023-11-29 RX ADMIN — FENTANYL CITRATE 125 MCG: 50 INJECTION, SOLUTION INTRAMUSCULAR; INTRAVENOUS at 07:45

## 2023-11-29 RX ADMIN — SODIUM CHLORIDE, POTASSIUM CHLORIDE, SODIUM LACTATE AND CALCIUM CHLORIDE: 600; 310; 30; 20 INJECTION, SOLUTION INTRAVENOUS at 11:24

## 2023-11-29 RX ADMIN — LIDOCAINE HYDROCHLORIDE 100 MG: 20 INJECTION, SOLUTION EPIDURAL; INFILTRATION; INTRACAUDAL at 07:45

## 2023-11-29 RX ADMIN — DOCUSATE SODIUM 100 MG: 100 CAPSULE, LIQUID FILLED ORAL at 17:03

## 2023-11-29 RX ADMIN — PHENYLEPHRINE HYDROCHLORIDE 100 MCG: 10 INJECTION INTRAVENOUS at 07:49

## 2023-11-29 RX ADMIN — DEXAMETHASONE SODIUM PHOSPHATE 4 MG: 4 INJECTION INTRA-ARTICULAR; INTRALESIONAL; INTRAMUSCULAR; INTRAVENOUS; SOFT TISSUE at 07:45

## 2023-11-29 RX ADMIN — SODIUM CHLORIDE, POTASSIUM CHLORIDE, SODIUM LACTATE AND CALCIUM CHLORIDE: 600; 310; 30; 20 INJECTION, SOLUTION INTRAVENOUS at 06:52

## 2023-11-29 RX ADMIN — FENTANYL CITRATE 50 MCG: 50 INJECTION, SOLUTION INTRAMUSCULAR; INTRAVENOUS at 09:27

## 2023-11-29 RX ADMIN — HEPARIN SODIUM 4000 UNITS: 1000 INJECTION, SOLUTION INTRAVENOUS; SUBCUTANEOUS at 10:06

## 2023-11-29 RX ADMIN — KETOROLAC TROMETHAMINE 30 MG: 30 INJECTION, SOLUTION INTRAMUSCULAR; INTRAVENOUS at 14:16

## 2023-11-29 RX ADMIN — FENTANYL CITRATE 25 MCG: 50 INJECTION, SOLUTION INTRAMUSCULAR; INTRAVENOUS at 11:26

## 2023-11-29 RX ADMIN — FENTANYL CITRATE 25 MCG: 50 INJECTION, SOLUTION INTRAMUSCULAR; INTRAVENOUS at 11:21

## 2023-11-29 RX ADMIN — PHENYLEPHRINE HYDROCHLORIDE 100 MCG: 10 INJECTION INTRAVENOUS at 07:50

## 2023-11-29 RX ADMIN — EPHEDRINE SULFATE 10 MG: 50 INJECTION, SOLUTION INTRAVENOUS at 07:52

## 2023-11-29 ASSESSMENT — LIFESTYLE VARIABLES
TOTAL SCORE: 1
HAVE PEOPLE ANNOYED YOU BY CRITICIZING YOUR DRINKING: NO
HAVE YOU EVER FELT YOU SHOULD CUT DOWN ON YOUR DRINKING: YES
ON A TYPICAL DAY WHEN YOU DRINK ALCOHOL HOW MANY DRINKS DO YOU HAVE: 3
CONSUMPTION TOTAL: POSITIVE
EVER HAD A DRINK FIRST THING IN THE MORNING TO STEADY YOUR NERVES TO GET RID OF A HANGOVER: NO
DOES PATIENT WANT TO STOP DRINKING: NO
HOW MANY TIMES IN THE PAST YEAR HAVE YOU HAD 5 OR MORE DRINKS IN A DAY: 2
TOTAL SCORE: 1
EVER FELT BAD OR GUILTY ABOUT YOUR DRINKING: NO
TOTAL SCORE: 1
AVERAGE NUMBER OF DAYS PER WEEK YOU HAVE A DRINK CONTAINING ALCOHOL: 5
ALCOHOL_USE: YES

## 2023-11-29 ASSESSMENT — COGNITIVE AND FUNCTIONAL STATUS - GENERAL
SUGGESTED CMS G CODE MODIFIER DAILY ACTIVITY: CH
DAILY ACTIVITIY SCORE: 24
SUGGESTED CMS G CODE MODIFIER MOBILITY: CH
MOBILITY SCORE: 24

## 2023-11-29 ASSESSMENT — PAIN DESCRIPTION - PAIN TYPE
TYPE: SURGICAL PAIN
TYPE: ACUTE PAIN;SURGICAL PAIN
TYPE: SURGICAL PAIN
TYPE: ACUTE PAIN;SURGICAL PAIN
TYPE: SURGICAL PAIN
TYPE: SURGICAL PAIN
TYPE: ACUTE PAIN;SURGICAL PAIN
TYPE: SURGICAL PAIN

## 2023-11-29 ASSESSMENT — PATIENT HEALTH QUESTIONNAIRE - PHQ9
SUM OF ALL RESPONSES TO PHQ9 QUESTIONS 1 AND 2: 0
2. FEELING DOWN, DEPRESSED, IRRITABLE, OR HOPELESS: NOT AT ALL
1. LITTLE INTEREST OR PLEASURE IN DOING THINGS: NOT AT ALL

## 2023-11-29 ASSESSMENT — PAIN SCALES - GENERAL: PAIN_LEVEL: 6

## 2023-11-29 NOTE — OR NURSING
Pt's VSS. Pt on 6L NC sating greater than 92%. Pt A&Ox4. Pt's bilateral groin sites CDI and soft. No signs of hematoma. Pt's friend, Cheli, called and updated. Pt's belongings on [facility]. Pt transferring to room T423.

## 2023-11-29 NOTE — ANESTHESIA POSTPROCEDURE EVALUATION
Patient: Antonio Cantu    Procedure Summary       Date: 11/29/23 Room / Location: [facility] 09 / SURGERY [facility]    Anesthesia Start: 0742 Anesthesia Stop: 1100    Procedures:       RIGHT ILIAC STENT INSERTION (Right: Groin)      ENDARTERECTOMY, FEMORAL (Right: Groin) Diagnosis: (PERIPHERAL ARTERIAL DISEASE WITH SEVERE ARTERIAL INSUFFICIENCY)    Surgeons: Brian Cameron M.D. Responsible Provider: Kj Stanford D.O.    Anesthesia Type: general ASA Status: 3            Final Anesthesia Type: general  Last vitals  BP   Blood Pressure: 120/73    Temp   36.4 °C (97.5 °F)    Pulse   90   Resp   (!) 25    SpO2   97 %      Anesthesia Post Evaluation    Patient location during evaluation: PACU  Patient participation: complete - patient participated  Level of consciousness: awake and alert  Pain score: 6    Airway patency: patent  Anesthetic complications: no  Cardiovascular status: hemodynamically stable  Respiratory status: acceptable  Hydration status: euvolemic    PONV: none          No notable events documented.     Nurse Pain Score: 8 (NPRS)

## 2023-11-29 NOTE — PROGRESS NOTES
4 Eyes Skin Assessment Completed by SHELLEY Dempsey and SHELLEY Tariq.    Head WDL  Ears WDL  Nose WDL  Mouth WDL  Neck WDL  Breast/Chest WDL  Shoulder Blades WDL  Spine WDL  (R) Arm/Elbow/Hand WDL  (L) Arm/Elbow/Hand Scab on elbow  Abdomen WDL  Groin Incisions on L and R groin, closed with dermabond. Periwound soft, no bruising noted.  Scrotum/Coccyx/Buttocks Redness and Blanching  (R) Leg Redness, Blanching, and Scar  (L) Leg Scar, BKA  (R) Heel/Foot/Toe Redness, Blanching, and Bruising, brittle nails  (L) BKA          Devices In Places Blood Pressure Cuff, Pulse Ox, Lebron, and Nasal Cannula      Interventions In Place Gray Ear Foams, Pillows, and Low Air Loss Mattress    Possible Skin Injury No    Pictures Uploaded Into Epic N/A  Wound Consult Placed N/A  RN Wound Prevention Protocol Ordered No

## 2023-11-29 NOTE — OP REPORT
VASCULAR SURGERY SERVICE                       Operative Note  _____________________________________________________    Date: 2023    Patient: Antonio Cantu  : 1960  MRN: [id number]  _____________________________________________________      Preoperative Diagnosis:  Peripheral arterial occlusive disease with right lower extremity rest pain and toe ulceration    Postoperative Diagnosis:  Peripheral arterial occlusive disease with right lower extremity rest pain and toe ulceration    Procedure:  -Right common femoral endarterectomy with primary closure (76019)  -Percutaneous access of the left common femoral artery with ultrasound guidance (19255)  - nonselective catheterization of the infrarenal aorta (52439)  -Aortoiliac angiogram and right lower extremity runoff angiogram (22335)  -Right external iliac artery stent placement (90592)  -Closure of the left common femoral artery access site with a 6 Citizen of the [place] Angio-Seal closure device which deployed successfully ()  _____________________________________________________    Surgeon:                                 Brian Cameron MD    Assistant:   none    Anesthesia:                             General anesthesia plus local anesthetic    EBL:                                        125cc    Heparin:                                  Systemically heparinized    Specimen:   None sent    Findings:   Long segment occlusion from the right common iliac artery down into the right external iliac artery.  Completely recanalized with stenting.  High-grade stenosis of the right common femoral artery.  Long segment occlusion of the SFA and popliteal artery with single-vessel peroneal runoff.  The peroneal artery reconstitutes proximally and it appears that it would serve as a suitable distal bypass target    Complications:                        none    Disposition:                             Tolerated well, sent to recovery in stable  condition    _____________________________________________________    Procedure Summary:  Following informed consent, the patient was placed supine on the operating table, and general anesthesia was administered.  The patient was prepped and draped sterile in the usual fashion. Surgical time-out was called, and everyone was in agreement.  The patient did receive preoperative prophylactic antibiotics.      An oblique incision was made over the right common femoral artery and then the artery was dissected out proximally and distally and encircled with Vesseloops.  Distally the loops were placed around the origins of the SFA and profunda.  The patient was systemically heparinized.  I accessed common femoral artery with a micro access sheath and attempted to pass the wire retrograde to perform a right lower extremity angiogram however the wire met resistance and it would not pass.  I therefore upsized to a 6 [place] sheath and I attempted to pass a wire and crossing catheter retrograde up the iliac artery into the aorta however the wire created multiple false planes and would not stay in the true lumen and I was not able to get a wire to cross the occluded segment.    At this point I accessed the left common femoral artery percutaneously with ultrasound guidance and I placed a 6 [place] sheath.  I advanced a wire and Omni Flush catheter up to the infrarenal aorta and I performed an aortoiliac angiogram this showed that the aortic bifurcation was patent and there was flow through the right common iliac artery down into the right internal iliac artery.  Of note the right common iliac artery was relatively short, and as a consequence of this the right external iliac artery is quite long originating high up in the pelvis.  Angiogram suggest that the entire length of the right external iliac artery is occluded with reconstitution of the common femoral artery near the inguinal ligament.  I was able to cross the occluded right  external iliac artery with an Omni Flush catheter and advantage angled Glidewire.  The wire passed down into the right profunda femoris artery followed by the catheter and then the catheter was used to shoot an angiogram to confirm that we had successfully crossed the occluded segment and we were in the true lumen of the right common femoral artery.  At this point the wire was placed back through the catheter into the right profunda femoris artery.  I pretreated the right external iliac artery occlusion with a 7 mm x 150 mm plain angioplasty balloon.  I then treated the distal segment of the external iliac artery first with a 7 mm x 100 mm self-expanding uncovered stent which traversed from the inguinal ligament retrograde up into the pelvis and 7 mm post stent angioplasty was performed.  Angiogram showed that additional coverage length was needed to completely treat the occluded segment and so I added an 8 mm x 57 mm balloon expandable uncovered stent from the origin of the right external iliac artery down into the previously placed 7 mm stent.  Angiogram showed that the right iliac artery was now widely patent with good flow down into the right common femoral artery.    A right lower extremity runoff angiogram was performed.  This showed high-grade stenosis of the right common femoral artery, a well-developed profunda with multiple collaterals, long segment occlusion of the SFA and popliteal artery, with single-vessel reconstitution of the peroneal artery proximally which traversed down to the ankle and it appears that the peroneal artery would be a suitable distal bypass target.    At this point the left common femoral sheath was removed and the site was closed with a 6 [place] Angio-Seal closure device which deployed successfully.  There is access site was later protected with skin glue at the conclusion of the case.    At this point the right common femoral artery was clamped proximally as well as the SFA and  profunda distally and then a long longitudinal arteriotomy was made across the anterior surface of the artery.  The lumen of the common femoral artery was almost completely filled with chronic thrombus which was removed and then endarterectomy was performed to remove the residual plaque.  The origins of the SFA and profunda were cleared and demonstrated good patency.  Overall the artery had a fairly generous caliber especially after endarterectomy and I was able to close the arteriotomy primarily and still have significant luminal diameter without requiring the use of a patch.  The arteriotomy was closed with a running 6-0 Prolene suture line.  The artery was flushed and irrigated prior to completion and once complete it was pressurized and found to be hemostatic and then flow was restored.  There were brisk posterior tibial and anterior tibial Doppler signals at the ankle at this point in time.    The wound was irrigated and suctioned clean.  Topical hemostatic was applied.  The incision was closed with multiple layers of absorbable suture and then skin glue to protect the incision.    The patient was then sent to recovery in stable condition.  All counts were correct at the conclusion of the case.    Postoperative Plan:    This procedure successfully restored inflow into the right leg and the patient has a well-developed profunda femoris artery with multiple collaterals.  The patient will most likely be out of rest pain however it is indeterminate if he will be able to heal the wound on his right fifth toe or not.  For now we will continue local care of the toe and if the wound gets worse then the patient will need a femoral to peroneal bypass for limb salvage.  Patient was started on aspirin and Plavix the day after the procedure with a Plavix loading dose of 300 followed by 75 mg daily.      Brian Cameron MD  [facility]

## 2023-11-29 NOTE — PROGRESS NOTES
"/70   Pulse 88   Temp 36.4 °C (97.5 °F) (Temporal)   Resp 16   Ht 1.93 m (6' 4\")   Wt 96 kg (211 lb 10.3 oz)   SpO2 93%   BMI 25.76 kg/m²       Patient reports pain at 3 on a scale of 0-10. Educated patient regarding pharmacologic and non pharmacologic modalities for pain management. Oriented to room call light and smoking policy.  Reviewed plan of care (equipment, incentive spirometer, sequential compression devices, medications, activity, diet, fall precautions, skin care, and pain) with patient and family. Welcome packet given and reviewed with patient, all questions answered. Education provided on oral hygiene program.    AA&Ox4. Denies CP/SOB.  See 2 RN skin note  Tolerating Regular diet. Denies N/V.  Lebron in place, patent and draining. Last BM PTA  Pt laying flat in bed.  All needs met at this time. Call light within reach. Pt calls appropriately. Bed low and locked, non skid socks in place. Hourly rounding in place.  Oxygen humidification provided.    "

## 2023-11-29 NOTE — ANESTHESIA PROCEDURE NOTES
Arterial Line    Performed by: Kj Stanford D.O.  Authorized by: Kj Stanford D.O.    Start Time:  11/29/2023 7:47 AM  End Time:  11/29/2023 7:48 AM  Localization: ultrasound guidance and surface landmarks    Patient Location:  OR  Indication: continuous blood pressure monitoring        Catheter Size:  20 G  Seldinger Technique?: Yes    Laterality:  Left  Site:  Radial artery  Line Secured:  Antimicrobial disc, tape and transparent dressing  Events: patient tolerated procedure well with no complications

## 2023-11-29 NOTE — ANESTHESIA PROCEDURE NOTES
Airway    Date/Time: 11/29/2023 7:46 AM    Performed by: Kj Stanford D.O.  Authorized by: Kj Stanford D.O.    Location:  OR  Urgency:  Elective  Indications for Airway Management:  Anesthesia      Spontaneous Ventilation: absent    Sedation Level:  Deep  Preoxygenated: Yes    Patient Position:  Sniffing  Mask Difficulty Assessment:  0 - not attempted  Final Airway Type:  Endotracheal airway  Final Endotracheal Airway:  ETT  Cuffed: Yes    Technique Used for Successful ETT Placement:  Direct laryngoscopy    Insertion Site:  Oral  Blade Type:  Demetri  Laryngoscope Blade/Videolaryngoscope Blade Size:  4  ETT Size (mm):  8.0  Measured from:  Teeth  ETT to Teeth (cm):  24  Placement Verified by: auscultation and capnometry    Cormack-Lehane Classification:  Grade I - full view of glottis  Number of Attempts at Approach:  1

## 2023-11-29 NOTE — ANESTHESIA TIME REPORT
Anesthesia Start and Stop Event Times       Date Time Event    11/29/2023 0724 Ready for Procedure     0742 Anesthesia Start     1100 Anesthesia Stop          Responsible Staff  11/29/23      Name Role Begin End    Kj Stanford D.O. Anesth 0742 1100          Overtime Reason:  no overtime (within assigned shift)    Comments:

## 2023-11-29 NOTE — ANESTHESIA PREPROCEDURE EVALUATION
Case: [id number] Date/Time: 11/29/23 0715    Procedures:       RIGHT LOWER EXTREMITY BYPASS, FEMORAL TO POPITEAL, RIGHT LOWER EXTREMITY ANGIOGRAM, POSSIBLE RIGHT BELOW THE KNEE AMPUTATION      AMPUTATION, BELOW KNEE    Pre-op diagnosis: PERIPHERAL ARTERIAL DISEASE    Location: [facility] OR 09 / SURGERY [facility]    Surgeons: Brian Cameron M.D.            Relevant Problems   ANESTHESIA   (positive) Sleep apnea      PULMONARY   (positive) Chronic bronchitis (HCC)      CARDIAC   (positive) PAD (peripheral artery disease) (HCC)       Physical Exam    Airway   Mallampati: II  TM distance: >3 FB  Neck ROM: full       Cardiovascular - normal exam  Rhythm: regular  Rate: normal  (-) murmur     Dental - normal exam           Pulmonary - normal exam  Breath sounds clear to auscultation     Abdominal    Neurological - normal exam                   Anesthesia Plan    ASA 3   ASA physical status 3 criteria: COPD - poorly controlled    Plan - general       Airway plan will be ETT          Induction: intravenous    Postoperative Plan: Postoperative administration of opioids is intended.    Pertinent diagnostic labs and testing reviewed    Informed Consent:    Anesthetic plan and risks discussed with patient.    Use of blood products discussed with: patient whom consented to blood products.

## 2023-11-29 NOTE — PROGRESS NOTES
Medication history reviewed with PT at bedside    [facility] is complete per PT reporting    Allergies reviewed.     Patient denies any outpatient antibiotics in the last 30 days.     Patient is not taking anticoagulants.    Preferred pharmacy for this visit - [facility] in [place] ([phone]).

## 2023-11-29 NOTE — CARE PLAN
The patient is Stable - Low risk of patient condition declining or worsening    Shift Goals  Clinical Goals: Pain control, monitor for bleeding, Ambulation  Patient Goals: Pain Control, hydration    Progress made toward(s) clinical / shift goals:  Pt resting comfortably in bed, pain well managed with current medications.  Fall risk safety education provided on admission with Pt vocalizing understanding.  Provided education on plan for the day and milestones related to discharge.      Problem: Pain - Standard  Goal: Alleviation of pain or a reduction in pain to the patient’s comfort goal  Outcome: Progressing     Problem: Fall Risk  Goal: Patient will remain free from falls  Outcome: Progressing     Problem: Knowledge Deficit - Standard  Goal: Patient and family/care givers will demonstrate understanding of plan of care, disease process/condition, diagnostic tests and medications  Outcome: Progressing

## 2023-11-30 VITALS
RESPIRATION RATE: 17 BRPM | WEIGHT: 211.64 LBS | HEIGHT: 76 IN | TEMPERATURE: 97.9 F | OXYGEN SATURATION: 98 % | BODY MASS INDEX: 25.77 KG/M2 | SYSTOLIC BLOOD PRESSURE: 141 MMHG | HEART RATE: 75 BPM | DIASTOLIC BLOOD PRESSURE: 99 MMHG

## 2023-11-30 LAB
ANION GAP SERPL CALC-SCNC: 6 MMOL/L (ref 7–16)
BUN SERPL-MCNC: 9 MG/DL (ref 8–22)
CALCIUM SERPL-MCNC: 10 MG/DL (ref 8.5–10.5)
CHLORIDE SERPL-SCNC: 99 MMOL/L (ref 96–112)
CO2 SERPL-SCNC: 29 MMOL/L (ref 20–33)
CREAT SERPL-MCNC: 0.64 MG/DL (ref 0.5–1.4)
ERYTHROCYTE [DISTWIDTH] IN BLOOD BY AUTOMATED COUNT: 46.3 FL (ref 35.9–50)
GFR SERPLBLD CREATININE-BSD FMLA CKD-EPI: 106 ML/MIN/1.73 M 2
GLUCOSE SERPL-MCNC: 106 MG/DL (ref 65–99)
HCT VFR BLD AUTO: 49 % (ref 42–52)
HGB BLD-MCNC: 16.5 G/DL (ref 14–18)
MCH RBC QN AUTO: 31.5 PG (ref 27–33)
MCHC RBC AUTO-ENTMCNC: 33.7 G/DL (ref 32.3–36.5)
MCV RBC AUTO: 93.5 FL (ref 81.4–97.8)
PLATELET # BLD AUTO: 263 K/UL (ref 164–446)
PMV BLD AUTO: 9.1 FL (ref 9–12.9)
POTASSIUM SERPL-SCNC: 4.5 MMOL/L (ref 3.6–5.5)
RBC # BLD AUTO: 5.24 M/UL (ref 4.7–6.1)
SODIUM SERPL-SCNC: 134 MMOL/L (ref 135–145)
WBC # BLD AUTO: 14.3 K/UL (ref 4.8–10.8)

## 2023-11-30 PROCEDURE — 99024 POSTOP FOLLOW-UP VISIT: CPT | Performed by: SURGERY

## 2023-11-30 PROCEDURE — 85027 COMPLETE CBC AUTOMATED: CPT

## 2023-11-30 PROCEDURE — 80048 BASIC METABOLIC PNL TOTAL CA: CPT

## 2023-11-30 PROCEDURE — 700111 HCHG RX REV CODE 636 W/ 250 OVERRIDE (IP): Performed by: SURGERY

## 2023-11-30 PROCEDURE — 700102 HCHG RX REV CODE 250 W/ 637 OVERRIDE(OP): Performed by: SURGERY

## 2023-11-30 PROCEDURE — A9270 NON-COVERED ITEM OR SERVICE: HCPCS | Performed by: SURGERY

## 2023-11-30 RX ORDER — ATORVASTATIN CALCIUM 40 MG/1
40 TABLET, FILM COATED ORAL DAILY
Qty: 90 TABLET | Refills: 3 | Status: SHIPPED | OUTPATIENT
Start: 2023-11-30

## 2023-11-30 RX ORDER — HYDROCODONE BITARTRATE AND ACETAMINOPHEN 5; 325 MG/1; MG/1
1-2 TABLET ORAL EVERY 6 HOURS PRN
Qty: 20 TABLET | Refills: 0 | Status: SHIPPED | OUTPATIENT
Start: 2023-11-30 | End: 2023-12-03

## 2023-11-30 RX ADMIN — KETOROLAC TROMETHAMINE 30 MG: 30 INJECTION, SOLUTION INTRAMUSCULAR; INTRAVENOUS at 02:45

## 2023-11-30 RX ADMIN — HEPARIN SODIUM 5000 UNITS: 5000 INJECTION, SOLUTION INTRAVENOUS; SUBCUTANEOUS at 05:37

## 2023-11-30 RX ADMIN — CLOPIDOGREL BISULFATE 75 MG: 75 TABLET ORAL at 05:37

## 2023-11-30 RX ADMIN — ACETAMINOPHEN 650 MG: 325 TABLET, FILM COATED ORAL at 02:44

## 2023-11-30 RX ADMIN — DOCUSATE SODIUM 100 MG: 100 CAPSULE, LIQUID FILLED ORAL at 05:37

## 2023-11-30 RX ADMIN — KETOROLAC TROMETHAMINE 30 MG: 30 INJECTION, SOLUTION INTRAMUSCULAR; INTRAVENOUS at 08:14

## 2023-11-30 RX ADMIN — ASPIRIN 81 MG: 81 TABLET, COATED ORAL at 05:37

## 2023-11-30 RX ADMIN — ACETAMINOPHEN 650 MG: 325 TABLET, FILM COATED ORAL at 08:13

## 2023-11-30 ASSESSMENT — PAIN DESCRIPTION - PAIN TYPE
TYPE: SURGICAL PAIN

## 2023-11-30 NOTE — DISCHARGE INSTRUCTIONS
" VASCULAR SURGERY SERVICE                        Progress Note  _________________________________     Doing well  RLE much better perfused, feeling much better     BP (!) 141/99 Comment: Rn notified   Pulse 75   Temp 36.6 °C (97.9 °F) (Temporal)   Resp 17   Ht 1.93 m (6' 4\")   Wt 96 kg (211 lb 10.3 oz)   SpO2 98%   BMI 25.76 kg/m²      Right groin incision CDI     A/P)  Home today  FU 2 weeks for progress check  Discussed local care of right 5th toe wound     Brian Cameron MD  [facility]   [employer] preferred or call my office [phone]  __________________________________________________  Patient:Antonio Cantu   MRN:[id number]        "

## 2023-11-30 NOTE — PROGRESS NOTES
"Received report from previous shift RN at 1900.  Assessment complete.  A&O x 4. Patient calls appropriately.  Patient ambulates with x1 assist.   Patient has 4/10 pain. Pain managed with prescribed medications per MAR.  Denies N&V. Tolerating regular diet.  Skin per flowsheets.  + void, + flatus, - BM.  Patient denies SOB on 1L O2 via NC.  /71   Pulse 73   Temp 37 °C (98.6 °F) (Temporal)   Resp 16   Ht 1.93 m (6' 4\")   Wt 96 kg (211 lb 10.3 oz)   SpO2 95%   BMI 25.76 kg/m²     Patient pleasant and cooperative throughout assessment.  Reviewed plan of care with patient, pt verbalizes understanding. Call light and personal belongings with in reach. Hourly rounding in place. All needs met at this time.    "

## 2023-11-30 NOTE — CARE PLAN
Problem: Pain - Standard  Goal: Alleviation of pain or a reduction in pain to the patient’s comfort goal  Outcome: Progressing     Problem: Fall Risk  Goal: Patient will remain free from falls  Outcome: Progressing     Problem: Knowledge Deficit - Standard  Goal: Patient and family/care givers will demonstrate understanding of plan of care, disease process/condition, diagnostic tests and medications  Outcome: Progressing   The patient is Stable - Low risk of patient condition declining or worsening    Shift Goals  Clinical Goals: Pain Management, Vascular Monitoring, Wound Care  Patient Goals: Pain Management, Comfort    Progress made toward(s) clinical / shift goals:  Pain medicated per MAR,Patient reports no pain, Patient Free from falls, Educated patient on plan of care this shift, Patient Verbalized Understanding     Patient is not progressing towards the following goals:

## 2023-11-30 NOTE — PROGRESS NOTES
"       VASCULAR SURGERY SERVICE                        Progress Note  _________________________________    Doing well  RLE much better perfused, feeling much better    BP (!) 141/99 Comment: Rn notified   Pulse 75   Temp 36.6 °C (97.9 °F) (Temporal)   Resp 17   Ht 1.93 m (6' 4\")   Wt 96 kg (211 lb 10.3 oz)   SpO2 98%   BMI 25.76 kg/m²     Right groin incision CDI    A/P)  Home today  FU 2 weeks for progress check  Discussed local care of right 5th toe wound    Brian Cameron MD  [facility]   [employer] preferred or call my office [phone]  __________________________________________________  Patient:Antonio Cantu   MRN:[id number]       "

## 2023-11-30 NOTE — CARE PLAN
The patient is Stable - Low risk of patient condition declining or worsening    Shift Goals  Clinical Goals: Vascular Monitoring; Pain Control;Bladder Function  Patient Goals: Comfort    Progress made toward(s) clinical / shift goals:  Patient medicated per MAR. Non-pharmacologic comfort measures implemented. Safety discussed. Education provided. Ambulation and repositioning encouraged.     Problem: Pain - Standard  Goal: Alleviation of pain or a reduction in pain to the patient’s comfort goal  Outcome: Progressing     Problem: Fall Risk  Goal: Patient will remain free from falls  Outcome: Progressing     Problem: Knowledge Deficit - Standard  Goal: Patient and family/care givers will demonstrate understanding of plan of care, disease process/condition, diagnostic tests and medications  Outcome: Progressing

## 2023-11-30 NOTE — PROGRESS NOTES
Bedside report received.  Assessment complete.  A&O x 4. Patient calls appropriately.  Patient ambulates with 0 assist. Bed alarm off.   Patient has 2/10 pain. Pain managed with prescribed medications.  Denies N&V. Tolerating Regular diet.  Surgical incisions are clean, dry, and intact.  + void, + flatus, - BM.  Patient denies SOB.  SCD's off. Patient refused. Education provided.  Patient has no complaints at this time.  Review plan with of care with patient. Call light and personal belongings within reach. Hourly rounding in place. All needs met at this time.

## 2023-11-30 NOTE — PROGRESS NOTES
Discharge orders acknowledged, Pt aware and compliant with new orders, D/C teaching completed, Pt able to verbalize needs and complaint with discharge orders. Personal belongings in pt possession.  PIV removed.  Pt escorted off unit via wheelchair with assistance from CNA.

## 2023-11-30 NOTE — PROGRESS NOTES
Assumed care of patient at 0645. Bedside report received. Assessment complete.  AA&Ox4. Denies CP/SOB.  Pulse Checks per Flowsheets   Reporting 2/10 pain. Medicated per MAR.   Educated patient regarding pharmacologic and non pharmacologic modalities for pain management.  Skin per flowsheets  Tolerating Regular diet. Denies N/V.  + void. Last BM PTA   Pt ambulates independently w Prosthetic Donned.  All needs met at this time. Call light within reach. Pt calls appropriately. Bed low and locked, non skid socks in place. Hourly rounding in place.

## 2023-12-21 ENCOUNTER — TELEPHONE (OUTPATIENT)
Dept: VASCULAR SURGERY | Facility: MEDICAL CENTER | Age: 63
End: 2023-12-21

## 2023-12-21 ENCOUNTER — HOSPITAL ENCOUNTER (OUTPATIENT)
Facility: MEDICAL CENTER | Age: 63
End: 2023-12-21
Attending: SURGERY | Admitting: SURGERY
Payer: OTHER MISCELLANEOUS

## 2023-12-21 ENCOUNTER — OFFICE VISIT (OUTPATIENT)
Dept: VASCULAR SURGERY | Facility: MEDICAL CENTER | Age: 63
End: 2023-12-21
Payer: OTHER MISCELLANEOUS

## 2023-12-21 VITALS
TEMPERATURE: 99.4 F | BODY MASS INDEX: 25.89 KG/M2 | HEIGHT: 76 IN | HEART RATE: 75 BPM | WEIGHT: 212.6 LBS | DIASTOLIC BLOOD PRESSURE: 70 MMHG | SYSTOLIC BLOOD PRESSURE: 112 MMHG | OXYGEN SATURATION: 95 %

## 2023-12-21 DIAGNOSIS — I77.9 PAOD (PERIPHERAL ARTERIAL OCCLUSIVE DISEASE) (HCC): ICD-10-CM

## 2023-12-21 PROCEDURE — 3074F SYST BP LT 130 MM HG: CPT | Performed by: SURGERY

## 2023-12-21 PROCEDURE — 3078F DIAST BP <80 MM HG: CPT | Performed by: SURGERY

## 2023-12-21 PROCEDURE — 99024 POSTOP FOLLOW-UP VISIT: CPT | Performed by: SURGERY

## 2023-12-21 ASSESSMENT — FIBROSIS 4 INDEX: FIB4 SCORE: 1.19

## 2023-12-21 NOTE — PROGRESS NOTES
Vascular Surgery  Presurgical H&P    Patient:Antonio Cantu  MRN:[id number]  Primary care physician:Pcp Pt States None    Vascular Consultant: Brian Cameron MD    12/21/2023  _____________________________________________________    Chief Complaint/Reason for Visit:     PAOD, nonhealing right foot wound    History of Present Illness:   Antonio Cantu is a 63 y.o. year old male well known to me from multiple previous vascular procedures.  Patient actually has a left BKA which was performed on 4/11/2022 at [facility] due to unreconstructable vascular disease.  He has started to develop issues with his right foot over the past several months and now has a small wound on his right fifth toe.  He has intermittent rest pain in the right foot.  He was taken for revascularization procedure on 11/29/2023 where I performed an extensive right femoral endarterectomy and right iliac stenting.  His profunda was well-developed and it appeared would provide good perfusion to the right foot however the patient says that clinically he does not feel significantly improved.  The neck step in revascularizing the right lower extremity will be a femoral to peroneal bypass and as I recall I believe his saphenous vein is adequate on bedside ultrasound, however if the saphenous vein is inadequate then I explained to him that we will need to use prosthetic conduit.    Past Medical History:     Past Medical History:   Diagnosis Date    Adenomatous colon polyp 11/2018    multiple, recall colonoscopy in 3 years    Allergic rhinitis 02/03/2022    Amputation of left great toe (HCC) 09/15/2021    Blood clotting disorder (HCC)     Left leg DVT    BPH with obstruction/lower urinary tract symptoms     Cervical spine fracture (HCC)     Chronic bronchitis ([facility])     Current every day smoker 06/19/2018    Erectile dysfunction 06/19/2018    Fracture of vertebra without spinal injury 02/03/2022    Gangrene of toe ([facility]) 09/15/2021    Hammer toe      Heart burn     Herpes     Hyperplasia of prostate 02/03/2022    Hypertension     Insomnia     Left below-knee amputee (HCC) 11/09/2022    Mixed hyperlipidemia 09/06/2018    Osteomyelitis ([facility]) 02/08/2022    Added automatically from request for surgery [id number]    Polycythemia 09/06/2018    Prehypertension 06/19/2018    Seasonal allergic rhinitis     Sleep apnea     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    Trimalleolar fracture of ankle, closed 11/16/2021    Added automatically from request for surgery [id number]     Past Surgical History:     Past Surgical History:   Procedure Laterality Date    ILIAC ANGIOPLASTY WITH STENT Right 11/29/2023    Procedure: RIGHT ILIAC STENT INSERTION;  Surgeon: Brian Cameron M.D.;  Location: [facility];  Service: Vascular    FEMORAL ENDARTERECTOMY Right 11/29/2023    Procedure: ENDARTERECTOMY, FEMORAL;  Surgeon: Brian Cameron M.D.;  Location: [facility];  Service: Vascular    PB AMPUTATION TOE,MT-P JT Left 2/11/2022    Procedure: AMPUTATION, TOE, SECOND;  Surgeon: Raymond Knox M.D.;  Location: [facility];  Service: Orthopedics    PB ARTHRODESIS,ANKLE,OPEN Left 11/19/2021    Procedure: FUSION, JOINT, ANKLE;  Surgeon: Raymond Knox M.D.;  Location: [facility];  Service: Orthopedics    FEMORAL POPLITEAL BYPASS Left 3/14/2021    Procedure: Revision Left Femoral to Popliteal Bypass;  Surgeon: Brian Cameron M.D.;  Location: [facility];  Service: Vascular    ANGIOGRAM Left 3/14/2021    Procedure: ANGIOGRAM;  Surgeon: Brian Cameron M.D.;  Location: [facility];  Service: Vascular    THROMBECTOMY Left 3/14/2021    Procedure: THROMBECTOMY;  Surgeon: Brian Cameron M.D.;  Location: [facility];  Service: Vascular    FEMORAL POPLITEAL BYPASS Left 3/13/2021    Procedure: CREATION, BYPASS, ARTERIAL, FEMORAL TO POPLITEAL, USING GRAFT;  Surgeon:  Brian Cameron M.D.;  Location: SURGERY [facility];  Service: Vascular    ANGIOGRAM Left 3/13/2021    Procedure: ANGIOGRAM;  Surgeon: Brian Cameron M.D.;  Location: SURGERY [facility];  Service: Vascular    IRRIGATION & DEBRIDEMENT GENERAL Left 3/13/2021    Procedure: IRRIGATION AND DEBRIDEMENT, WOUND;  Surgeon: Brian Cameron M.D.;  Location: SURGERY [facility];  Service: Vascular    HAMMERTOE CORRECTION       Allergies:   No Known Allergies  Medications:     Outpatient Encounter Medications as of 12/21/2023   Medication Sig Dispense Refill    atorvastatin (LIPITOR) 40 MG Tab Take 1 Tablet by mouth every day. 90 Tablet 3    Naproxen Sodium (ALEVE) 220 MG Cap Take 660 mg by mouth 2 times a day as needed (pain).      ibuprofen (MOTRIN) 200 MG Tab Take 3-4 Tablets by mouth 3 times a day as needed (Moderate Pain). 3 to 4 tablets = 600 to 800 mg.       No facility-administered encounter medications on file as of 12/21/2023.     Social History:     Social History     Socioeconomic History    Marital status:      Spouse name: Not on file    Number of children: Not on file    Years of education: Not on file    Highest education level: Not on file   Occupational History    Not on file   Tobacco Use    Smoking status: Every Day     Current packs/day: 1.00     Average packs/day: 1 pack/day for 44.0 years (44.0 ttl pk-yrs)     Types: Cigarettes     Start date: 1980    Smokeless tobacco: Never    Tobacco comments:     20 cig/day   Vaping Use    Vaping Use: Never used   Substance and Sexual Activity    Alcohol use: Yes     Alcohol/week: 3.0 - 3.6 oz     Types: 5 - 6 Cans of beer per week     Comment: 3-8 beers a day    Drug use: Yes     Types: Marijuana, Inhaled     Comment: last use 11/28    Sexual activity: Yes     Partners: Female   Other Topics Concern    Not on file   Social History Narrative    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     Diet: typically eats  one meal per day, just dinner. Does drink 1-6 beers per night, sometimes up to 10 beers a night.    Not . 2 daughters, 2 grandchildren.     Hobbies: enjoys camping, golfing, bicycle.      Social Determinants of Health     Financial Resource Strain: Not on file   Food Insecurity: Not on file   Transportation Needs: Not on file   Physical Activity: Not on file   Stress: Not on file   Social Connections: Not on file   Intimate Partner Violence: Not on file   Housing Stability: Not on file      Social History     Tobacco Use   Smoking Status Every Day    Current packs/day: 1.00    Average packs/day: 1 pack/day for 44.0 years (44.0 ttl pk-yrs)    Types: Cigarettes    Start date: 1980   Smokeless Tobacco Never   Tobacco Comments    20 cig/day     Social History     Substance and Sexual Activity   Alcohol Use Yes    Alcohol/week: 3.0 - 3.6 oz    Types: 5 - 6 Cans of beer per week    Comment: 3-8 beers a day     Social History     Substance and Sexual Activity   Drug Use Yes    Types: Marijuana, Inhaled    Comment: last use 11/28      Family History:     Family History   Problem Relation Age of Onset    Arthritis Other     Diabetes Mother     Cancer Father         colon or prostate     Lung Disease Father         emphysema     Diabetes Sister     Diabetes Brother     Diabetes Maternal Grandmother     Diabetes Paternal Grandfather     No Known Problems Daughter     No Known Problems Daughter     No Known Problems Grandchild     No Known Problems Grandchild        Review of Systems:   Constitutional: Negative for fever or chills  HENT:   Negative for hearing loss or tinnitus    Eyes:    Negative for blurred vision or loss of vision  Respiratory:  Negative for cough or hemoptysis  Cardiac:  Negative for chest pain or palpitations  Vascular:  Positive for claudication, Positive for rest pain  Gastrointestinal: Negative for vomiting or abdominal pain     Negative for hematochezia or melena   Genitourinary: Negative for  "dysuria or hematuria   Musculoskeletal: Negative for myalgias or acute joint pain  Skin:   Negative for itching or rash  Neurological:  Negative for dizziness or headaches     Negative for speech disturbance     Negative for extremity weakness or paresthesias  Endo/Heme:  Negative for easy bruising or bleeding         Exam:   /70 (BP Location: Left arm, Patient Position: Sitting, BP Cuff Size: Adult)   Pulse 75   Temp 37.4 °C (99.4 °F) (Temporal)   Ht 1.93 m (6' 4\")   Wt 96.4 kg (212 lb 9.6 oz)   SpO2 95%   BMI 25.88 kg/m²     Constitutional: Alert, oriented, no acute distress  HEENT:  Normocephalic and atraumatic, EOMI  Neck:   Supple, no JVD,   Cardiovascular: Regular rate and rhythm,   Pulmonary:  Good air entry bilaterally,    Abdominal:  Soft, non-tender, non-distended  Musculoskeletal: No tenderness, no deformity  Neurological:  CN II-XII grossly intact, no focal deficits  Skin:   Skin is warm and dry. No rash noted.  Vascular exam: Upper extremities are warm and well-perfused with no edema  Left BKA stump is well-healed, no specific concerns.  Right forefoot demonstrates a slight purple discoloration and there is a small scab on the right fifth toe.  Weak monophasic posterior tibial Doppler signal, no detectable dorsalis pedis Doppler signal.    Imaging:   Intraoperative angiogram performed 11/29/2023 shows that the profunda femoris artery is well-developed and collaterals go down and reconstitute the peroneal artery which appears to be a suitable distal bypass target.      Assessment and Plan:   -Peripheral arterial occlusive disease with nonhealing wound of the right foot and rest pain    Based on exam the patient will need additional revascularization of the right lower extremity in the form of a runoff procedure. It appears he will need a right femoral-peroneal bypass, and previous vein mapping shows his saphenous vein should be adequate for bypass conduit.  I did explain to the patient this " may be our last operation to attempt to salvage his right lower extremity and he has a high risk for ultimately requiring a right below the knee amputation as well.  We discussed the steps of the operation and the expected recovery.  We also discussed the risks.  Questions were answered and he agreed to proceed.    _____________________________________________________  Brian Cameron MD  [facility]  [phone]  [address], [place] [address]

## 2023-12-21 NOTE — TELEPHONE ENCOUNTER
I called patient and scheduled procedure with Dr. Cameron for 1/10 @ 10:00 am. Patient is to check in @ 8:00 am in the [facility] 1st floor.     Patient has been instructed nothing to eat or drink 8 hours prior and he will need a  once discharged from the hospital.

## 2023-12-26 ENCOUNTER — APPOINTMENT (OUTPATIENT)
Dept: ADMISSIONS | Facility: MEDICAL CENTER | Age: 63
End: 2023-12-26
Attending: SURGERY
Payer: OTHER MISCELLANEOUS

## 2024-01-03 ENCOUNTER — PRE-ADMISSION TESTING (OUTPATIENT)
Dept: ADMISSIONS | Facility: MEDICAL CENTER | Age: 64
End: 2024-01-03
Attending: SURGERY
Payer: OTHER MISCELLANEOUS

## 2024-01-03 ENCOUNTER — TELEPHONE (OUTPATIENT)
Dept: VASCULAR SURGERY | Facility: MEDICAL CENTER | Age: 64
End: 2024-01-03
Payer: OTHER MISCELLANEOUS

## 2024-01-03 NOTE — TELEPHONE ENCOUNTER
Patient called and stated that he has been experiencing a cough and congestion since 12/31. He was also seen at his PCP's office today regarding an infection in the stump area. Patient was started on Bactroban and Keflex. Message reported to Dr. Cameron.    I received a call from a pre op nurse Lorena and she asked if the labs need to be repeated as well as the CXR and EKG.     Per Dr. Cameron, ok to have repeat labs the day of procedure on 1/10.     I called and left a message for patient to notify him that he is good to go for the procedure as well as having repeat labs and a CXR and EKG as well.     I s/w Aydee in [facility] pre op and relayed the message regarding pre op labs, EKG and CXR to be done the day of the procedure. Lorena was not available.

## 2024-01-04 NOTE — OR NURSING
Patient stated during his pre-admit telephone appointment today that he saw his PCP today for  his infected left leg stump and nasal congestion/cough which started on 12-. Informed MD Cameron's surgery scheduler Sonja who stated she will inform MD Cameron of the above.

## 2024-01-08 ENCOUNTER — TELEPHONE (OUTPATIENT)
Dept: VASCULAR SURGERY | Facility: MEDICAL CENTER | Age: 64
End: 2024-01-08
Payer: OTHER MISCELLANEOUS

## 2024-01-08 NOTE — TELEPHONE ENCOUNTER
Patient called and left a message that he is still sick and needs to cancel his procedure with Dr. Montez for 1/10.    I called patient back to confirm his procedure cancellation. He stated that he thinks he has covid but has not yet tested for it yet. His friend was going to bring him a test. He stated that his symptoms are congestion and cough. He said he feels miserable. I told patient that I will cancel his procedure and to call back when he feels better.     Message sent to Dr. Cameron about procedure cancellation.

## 2024-01-17 ENCOUNTER — TELEPHONE (OUTPATIENT)
Dept: VASCULAR SURGERY | Facility: MEDICAL CENTER | Age: 64
End: 2024-01-17
Payer: OTHER MISCELLANEOUS

## 2024-01-17 NOTE — TELEPHONE ENCOUNTER
Patient called and stated that he is feeling better and that he is feeling better and would like to schedule his procedure with Dr. Cameron.     Patient is scheduled for 1/31 @ 7:30 am. Patient is to check in @ 5:30 am in the [address] floor. Patient has been instructed nothing to eat or drink 8 hours prior and he will need a  once discharged.

## 2024-01-18 ENCOUNTER — APPOINTMENT (OUTPATIENT)
Dept: ADMISSIONS | Facility: MEDICAL CENTER | Age: 64
DRG: 253 | End: 2024-01-18
Attending: SURGERY
Payer: OTHER MISCELLANEOUS

## 2024-01-19 ENCOUNTER — PRE-ADMISSION TESTING (OUTPATIENT)
Dept: ADMISSIONS | Facility: MEDICAL CENTER | Age: 64
DRG: 253 | End: 2024-01-19
Attending: SURGERY
Payer: OTHER MISCELLANEOUS

## 2024-01-26 ENCOUNTER — TELEPHONE (OUTPATIENT)
Dept: VASCULAR SURGERY | Facility: MEDICAL CENTER | Age: 64
End: 2024-01-26
Payer: OTHER MISCELLANEOUS

## 2024-01-26 NOTE — TELEPHONE ENCOUNTER
I called patient and confirmed procedure with Dr. Cameron for 1/31 @ 7:30 am. Patient is to check in @ 5:30 am in the [address] floor.      I asked patient if he had questions and he stated none at this time and thanked me for the call.

## 2024-01-31 ENCOUNTER — APPOINTMENT (OUTPATIENT)
Dept: RADIOLOGY | Facility: MEDICAL CENTER | Age: 64
DRG: 253 | End: 2024-01-31
Attending: SURGERY
Payer: OTHER MISCELLANEOUS

## 2024-01-31 ENCOUNTER — ANESTHESIA (OUTPATIENT)
Dept: SURGERY | Facility: MEDICAL CENTER | Age: 64
DRG: 253 | End: 2024-01-31
Payer: OTHER MISCELLANEOUS

## 2024-01-31 ENCOUNTER — HOSPITAL ENCOUNTER (INPATIENT)
Facility: MEDICAL CENTER | Age: 64
LOS: 5 days | DRG: 253 | End: 2024-02-05
Attending: SURGERY | Admitting: SURGERY
Payer: OTHER MISCELLANEOUS

## 2024-01-31 DIAGNOSIS — I77.9 PAOD (PERIPHERAL ARTERIAL OCCLUSIVE DISEASE) (HCC): ICD-10-CM

## 2024-01-31 DIAGNOSIS — G89.18 POSTOPERATIVE PAIN: ICD-10-CM

## 2024-01-31 LAB
ABO GROUP BLD: NORMAL
ALBUMIN SERPL BCP-MCNC: 3.9 G/DL (ref 3.2–4.9)
ALBUMIN/GLOB SERPL: 1.1 G/DL
ALP SERPL-CCNC: 115 U/L (ref 30–99)
ALT SERPL-CCNC: 13 U/L (ref 2–50)
ANION GAP SERPL CALC-SCNC: 12 MMOL/L (ref 7–16)
AST SERPL-CCNC: 22 U/L (ref 12–45)
BILIRUB SERPL-MCNC: 0.2 MG/DL (ref 0.1–1.5)
BLD GP AB SCN SERPL QL: NORMAL
BUN SERPL-MCNC: 11 MG/DL (ref 8–22)
CALCIUM ALBUM COR SERPL-MCNC: 10.2 MG/DL (ref 8.5–10.5)
CALCIUM SERPL-MCNC: 10.1 MG/DL (ref 8.5–10.5)
CHLORIDE SERPL-SCNC: 101 MMOL/L (ref 96–112)
CO2 SERPL-SCNC: 22 MMOL/L (ref 20–33)
CREAT SERPL-MCNC: 0.61 MG/DL (ref 0.5–1.4)
ERYTHROCYTE [DISTWIDTH] IN BLOOD BY AUTOMATED COUNT: 42.5 FL (ref 35.9–50)
GFR SERPLBLD CREATININE-BSD FMLA CKD-EPI: 107 ML/MIN/1.73 M 2
GLOBULIN SER CALC-MCNC: 3.4 G/DL (ref 1.9–3.5)
GLUCOSE SERPL-MCNC: 111 MG/DL (ref 65–99)
HCT VFR BLD AUTO: 48.8 % (ref 42–52)
HGB BLD-MCNC: 17.1 G/DL (ref 14–18)
INR PPP: 0.88 (ref 0.87–1.13)
MCH RBC QN AUTO: 31.4 PG (ref 27–33)
MCHC RBC AUTO-ENTMCNC: 35 G/DL (ref 32.3–36.5)
MCV RBC AUTO: 89.7 FL (ref 81.4–97.8)
PLATELET # BLD AUTO: 309 K/UL (ref 164–446)
PMV BLD AUTO: 8.9 FL (ref 9–12.9)
POTASSIUM SERPL-SCNC: 4.2 MMOL/L (ref 3.6–5.5)
PROT SERPL-MCNC: 7.3 G/DL (ref 6–8.2)
PROTHROMBIN TIME: 12 SEC (ref 12–14.6)
RBC # BLD AUTO: 5.44 M/UL (ref 4.7–6.1)
RH BLD: NORMAL
SODIUM SERPL-SCNC: 135 MMOL/L (ref 135–145)
WBC # BLD AUTO: 11.6 K/UL (ref 4.8–10.8)

## 2024-01-31 PROCEDURE — C1757 CATH, THROMBECTOMY/EMBOLECT: HCPCS | Performed by: SURGERY

## 2024-01-31 PROCEDURE — 160036 HCHG PACU - EA ADDL 30 MINS PHASE I: Performed by: SURGERY

## 2024-01-31 PROCEDURE — 80053 COMPREHEN METABOLIC PANEL: CPT

## 2024-01-31 PROCEDURE — 85027 COMPLETE CBC AUTOMATED: CPT

## 2024-01-31 PROCEDURE — 041K09N BYPASS RIGHT FEMORAL ARTERY TO POSTERIOR TIBIAL ARTERY WITH AUTOLOGOUS VENOUS TISSUE, OPEN APPROACH: ICD-10-PCS | Performed by: SURGERY

## 2024-01-31 PROCEDURE — 86900 BLOOD TYPING SEROLOGIC ABO: CPT

## 2024-01-31 PROCEDURE — 700117 HCHG RX CONTRAST REV CODE 255: Performed by: SURGERY

## 2024-01-31 PROCEDURE — 160002 HCHG RECOVERY MINUTES (STAT): Performed by: SURGERY

## 2024-01-31 PROCEDURE — B41F1ZZ FLUOROSCOPY OF RIGHT LOWER EXTREMITY ARTERIES USING LOW OSMOLAR CONTRAST: ICD-10-PCS | Performed by: SURGERY

## 2024-01-31 PROCEDURE — 75774 ARTERY X-RAY EACH VESSEL: CPT | Mod: RT

## 2024-01-31 PROCEDURE — 160029 HCHG SURGERY MINUTES - 1ST 30 MINS LEVEL 4: Performed by: SURGERY

## 2024-01-31 PROCEDURE — A9270 NON-COVERED ITEM OR SERVICE: HCPCS | Performed by: SURGERY

## 2024-01-31 PROCEDURE — 85610 PROTHROMBIN TIME: CPT

## 2024-01-31 PROCEDURE — 160041 HCHG SURGERY MINUTES - EA ADDL 1 MIN LEVEL 4: Performed by: SURGERY

## 2024-01-31 PROCEDURE — 700102 HCHG RX REV CODE 250 W/ 637 OVERRIDE(OP): Performed by: ANESTHESIOLOGY

## 2024-01-31 PROCEDURE — 110454 HCHG SHELL REV 250: Performed by: SURGERY

## 2024-01-31 PROCEDURE — 700111 HCHG RX REV CODE 636 W/ 250 OVERRIDE (IP): Performed by: ANESTHESIOLOGY

## 2024-01-31 PROCEDURE — 160009 HCHG ANES TIME/MIN: Performed by: SURGERY

## 2024-01-31 PROCEDURE — 700111 HCHG RX REV CODE 636 W/ 250 OVERRIDE (IP): Performed by: SURGERY

## 2024-01-31 PROCEDURE — 770001 HCHG ROOM/CARE - MED/SURG/GYN PRIV*

## 2024-01-31 PROCEDURE — 36140 INTRO NDL ICATH UPR/LXTR ART: CPT | Mod: 59 | Performed by: SURGERY

## 2024-01-31 PROCEDURE — 160035 HCHG PACU - 1ST 60 MINS PHASE I: Performed by: SURGERY

## 2024-01-31 PROCEDURE — 700101 HCHG RX REV CODE 250: Performed by: SURGERY

## 2024-01-31 PROCEDURE — 700102 HCHG RX REV CODE 250 W/ 637 OVERRIDE(OP): Performed by: SURGERY

## 2024-01-31 PROCEDURE — 35566 ART BYP FEM-ANT-POST TIB/PRL: CPT | Mod: RT | Performed by: SURGERY

## 2024-01-31 PROCEDURE — C1894 INTRO/SHEATH, NON-LASER: HCPCS | Performed by: SURGERY

## 2024-01-31 PROCEDURE — 86901 BLOOD TYPING SEROLOGIC RH(D): CPT

## 2024-01-31 PROCEDURE — 160048 HCHG OR STATISTICAL LEVEL 1-5: Performed by: SURGERY

## 2024-01-31 PROCEDURE — 700101 HCHG RX REV CODE 250: Performed by: ANESTHESIOLOGY

## 2024-01-31 PROCEDURE — A9270 NON-COVERED ITEM OR SERVICE: HCPCS | Performed by: ANESTHESIOLOGY

## 2024-01-31 PROCEDURE — 700111 HCHG RX REV CODE 636 W/ 250 OVERRIDE (IP): Mod: JZ | Performed by: ANESTHESIOLOGY

## 2024-01-31 PROCEDURE — 700105 HCHG RX REV CODE 258: Performed by: SURGERY

## 2024-01-31 PROCEDURE — 86850 RBC ANTIBODY SCREEN: CPT

## 2024-01-31 PROCEDURE — 36415 COLL VENOUS BLD VENIPUNCTURE: CPT

## 2024-01-31 RX ORDER — CLONIDINE HYDROCHLORIDE 0.1 MG/1
0.2 TABLET ORAL
Status: DISCONTINUED | OUTPATIENT
Start: 2024-01-31 | End: 2024-02-05 | Stop reason: HOSPADM

## 2024-01-31 RX ORDER — ROCURONIUM BROMIDE 10 MG/ML
INJECTION, SOLUTION INTRAVENOUS PRN
Status: DISCONTINUED | OUTPATIENT
Start: 2024-01-31 | End: 2024-01-31 | Stop reason: SURG

## 2024-01-31 RX ORDER — IODIXANOL 270 MG/ML
INJECTION, SOLUTION INTRAVASCULAR
Status: DISCONTINUED | OUTPATIENT
Start: 2024-01-31 | End: 2024-01-31 | Stop reason: HOSPADM

## 2024-01-31 RX ORDER — CLONIDINE HYDROCHLORIDE 0.1 MG/1
0.1 TABLET ORAL
Status: DISCONTINUED | OUTPATIENT
Start: 2024-01-31 | End: 2024-02-05 | Stop reason: HOSPADM

## 2024-01-31 RX ORDER — SODIUM CHLORIDE, SODIUM LACTATE, POTASSIUM CHLORIDE, CALCIUM CHLORIDE 600; 310; 30; 20 MG/100ML; MG/100ML; MG/100ML; MG/100ML
INJECTION, SOLUTION INTRAVENOUS CONTINUOUS
Status: DISCONTINUED | OUTPATIENT
Start: 2024-01-31 | End: 2024-01-31 | Stop reason: HOSPADM

## 2024-01-31 RX ORDER — ONDANSETRON 2 MG/ML
4 INJECTION INTRAMUSCULAR; INTRAVENOUS EVERY 4 HOURS PRN
Status: DISCONTINUED | OUTPATIENT
Start: 2024-01-31 | End: 2024-02-05 | Stop reason: HOSPADM

## 2024-01-31 RX ORDER — OXYCODONE HCL 5 MG/5 ML
5 SOLUTION, ORAL ORAL
Status: COMPLETED | OUTPATIENT
Start: 2024-01-31 | End: 2024-01-31

## 2024-01-31 RX ORDER — EPHEDRINE SULFATE 50 MG/ML
INJECTION, SOLUTION INTRAVENOUS PRN
Status: DISCONTINUED | OUTPATIENT
Start: 2024-01-31 | End: 2024-01-31 | Stop reason: SURG

## 2024-01-31 RX ORDER — MEPERIDINE HYDROCHLORIDE 25 MG/ML
12.5 INJECTION INTRAMUSCULAR; INTRAVENOUS; SUBCUTANEOUS
Status: DISCONTINUED | OUTPATIENT
Start: 2024-01-31 | End: 2024-01-31 | Stop reason: HOSPADM

## 2024-01-31 RX ORDER — HYDROMORPHONE HYDROCHLORIDE 1 MG/ML
0.5 INJECTION, SOLUTION INTRAMUSCULAR; INTRAVENOUS; SUBCUTANEOUS
Status: DISCONTINUED | OUTPATIENT
Start: 2024-01-31 | End: 2024-02-05 | Stop reason: HOSPADM

## 2024-01-31 RX ORDER — ATORVASTATIN CALCIUM 40 MG/1
40 TABLET, FILM COATED ORAL EVERY EVENING
Status: DISCONTINUED | OUTPATIENT
Start: 2024-01-31 | End: 2024-02-05 | Stop reason: HOSPADM

## 2024-01-31 RX ORDER — DOCUSATE SODIUM 100 MG/1
100 CAPSULE, LIQUID FILLED ORAL 2 TIMES DAILY
Status: DISCONTINUED | OUTPATIENT
Start: 2024-01-31 | End: 2024-02-05 | Stop reason: HOSPADM

## 2024-01-31 RX ORDER — HYDROMORPHONE HYDROCHLORIDE 2 MG/ML
INJECTION, SOLUTION INTRAMUSCULAR; INTRAVENOUS; SUBCUTANEOUS PRN
Status: DISCONTINUED | OUTPATIENT
Start: 2024-01-31 | End: 2024-01-31 | Stop reason: SURG

## 2024-01-31 RX ORDER — HYDROMORPHONE HYDROCHLORIDE 1 MG/ML
0.1 INJECTION, SOLUTION INTRAMUSCULAR; INTRAVENOUS; SUBCUTANEOUS
Status: DISCONTINUED | OUTPATIENT
Start: 2024-01-31 | End: 2024-01-31 | Stop reason: HOSPADM

## 2024-01-31 RX ORDER — SODIUM CHLORIDE, SODIUM LACTATE, POTASSIUM CHLORIDE, CALCIUM CHLORIDE 600; 310; 30; 20 MG/100ML; MG/100ML; MG/100ML; MG/100ML
INJECTION, SOLUTION INTRAVENOUS CONTINUOUS
Status: ACTIVE | OUTPATIENT
Start: 2024-01-31 | End: 2024-01-31

## 2024-01-31 RX ORDER — ACETAMINOPHEN 500 MG
1000 TABLET ORAL EVERY 6 HOURS PRN
Status: DISCONTINUED | OUTPATIENT
Start: 2024-01-31 | End: 2024-01-31 | Stop reason: HOSPADM

## 2024-01-31 RX ORDER — HYDRALAZINE HYDROCHLORIDE 20 MG/ML
5 INJECTION INTRAMUSCULAR; INTRAVENOUS
Status: DISCONTINUED | OUTPATIENT
Start: 2024-01-31 | End: 2024-01-31 | Stop reason: HOSPADM

## 2024-01-31 RX ORDER — DEXAMETHASONE SODIUM PHOSPHATE 4 MG/ML
INJECTION, SOLUTION INTRA-ARTICULAR; INTRALESIONAL; INTRAMUSCULAR; INTRAVENOUS; SOFT TISSUE PRN
Status: DISCONTINUED | OUTPATIENT
Start: 2024-01-31 | End: 2024-01-31 | Stop reason: SURG

## 2024-01-31 RX ORDER — ACETAMINOPHEN 500 MG
1000 TABLET ORAL EVERY 6 HOURS PRN
Status: DISCONTINUED | OUTPATIENT
Start: 2024-02-05 | End: 2024-02-05 | Stop reason: HOSPADM

## 2024-01-31 RX ORDER — SODIUM CHLORIDE 9 MG/ML
INJECTION, SOLUTION INTRAVENOUS CONTINUOUS
Status: ACTIVE | OUTPATIENT
Start: 2024-01-31 | End: 2024-01-31

## 2024-01-31 RX ORDER — OXYCODONE HCL 5 MG/5 ML
10 SOLUTION, ORAL ORAL
Status: COMPLETED | OUTPATIENT
Start: 2024-01-31 | End: 2024-01-31

## 2024-01-31 RX ORDER — DIPHENHYDRAMINE HYDROCHLORIDE 50 MG/ML
6.25 INJECTION INTRAMUSCULAR; INTRAVENOUS
Status: DISCONTINUED | OUTPATIENT
Start: 2024-01-31 | End: 2024-01-31 | Stop reason: HOSPADM

## 2024-01-31 RX ORDER — OXYCODONE HYDROCHLORIDE 5 MG/1
5 TABLET ORAL
Status: DISCONTINUED | OUTPATIENT
Start: 2024-01-31 | End: 2024-02-05 | Stop reason: HOSPADM

## 2024-01-31 RX ORDER — DEXAMETHASONE SODIUM PHOSPHATE 4 MG/ML
4 INJECTION, SOLUTION INTRA-ARTICULAR; INTRALESIONAL; INTRAMUSCULAR; INTRAVENOUS; SOFT TISSUE
Status: DISCONTINUED | OUTPATIENT
Start: 2024-01-31 | End: 2024-02-05 | Stop reason: HOSPADM

## 2024-01-31 RX ORDER — HYDRALAZINE HYDROCHLORIDE 20 MG/ML
10 INJECTION INTRAMUSCULAR; INTRAVENOUS EVERY 4 HOURS PRN
Status: DISCONTINUED | OUTPATIENT
Start: 2024-01-31 | End: 2024-02-05 | Stop reason: HOSPADM

## 2024-01-31 RX ORDER — SCOLOPAMINE TRANSDERMAL SYSTEM 1 MG/1
1 PATCH, EXTENDED RELEASE TRANSDERMAL
Status: DISCONTINUED | OUTPATIENT
Start: 2024-01-31 | End: 2024-02-05 | Stop reason: HOSPADM

## 2024-01-31 RX ORDER — DIPHENHYDRAMINE HYDROCHLORIDE 50 MG/ML
25 INJECTION INTRAMUSCULAR; INTRAVENOUS EVERY 6 HOURS PRN
Status: DISCONTINUED | OUTPATIENT
Start: 2024-01-31 | End: 2024-02-05 | Stop reason: HOSPADM

## 2024-01-31 RX ORDER — LIDOCAINE HYDROCHLORIDE 20 MG/ML
INJECTION, SOLUTION EPIDURAL; INFILTRATION; INTRACAUDAL; PERINEURAL PRN
Status: DISCONTINUED | OUTPATIENT
Start: 2024-01-31 | End: 2024-01-31 | Stop reason: SURG

## 2024-01-31 RX ORDER — IBUPROFEN 800 MG/1
800 TABLET ORAL 3 TIMES DAILY PRN
Status: DISCONTINUED | OUTPATIENT
Start: 2024-02-03 | End: 2024-02-05 | Stop reason: HOSPADM

## 2024-01-31 RX ORDER — CEFAZOLIN SODIUM 1 G/3ML
INJECTION, POWDER, FOR SOLUTION INTRAMUSCULAR; INTRAVENOUS PRN
Status: DISCONTINUED | OUTPATIENT
Start: 2024-01-31 | End: 2024-01-31 | Stop reason: SURG

## 2024-01-31 RX ORDER — HYDROMORPHONE HYDROCHLORIDE 1 MG/ML
0.4 INJECTION, SOLUTION INTRAMUSCULAR; INTRAVENOUS; SUBCUTANEOUS
Status: DISCONTINUED | OUTPATIENT
Start: 2024-01-31 | End: 2024-01-31 | Stop reason: HOSPADM

## 2024-01-31 RX ORDER — HALOPERIDOL 5 MG/ML
1 INJECTION INTRAMUSCULAR
Status: DISCONTINUED | OUTPATIENT
Start: 2024-01-31 | End: 2024-01-31 | Stop reason: HOSPADM

## 2024-01-31 RX ORDER — HEPARIN SODIUM 1000 [USP'U]/ML
INJECTION, SOLUTION INTRAVENOUS; SUBCUTANEOUS PRN
Status: DISCONTINUED | OUTPATIENT
Start: 2024-01-31 | End: 2024-01-31 | Stop reason: SURG

## 2024-01-31 RX ORDER — HYDROMORPHONE HYDROCHLORIDE 1 MG/ML
0.2 INJECTION, SOLUTION INTRAMUSCULAR; INTRAVENOUS; SUBCUTANEOUS
Status: DISCONTINUED | OUTPATIENT
Start: 2024-01-31 | End: 2024-01-31 | Stop reason: HOSPADM

## 2024-01-31 RX ORDER — ONDANSETRON 2 MG/ML
INJECTION INTRAMUSCULAR; INTRAVENOUS PRN
Status: DISCONTINUED | OUTPATIENT
Start: 2024-01-31 | End: 2024-01-31 | Stop reason: SURG

## 2024-01-31 RX ORDER — HEPARIN SODIUM 5000 [USP'U]/ML
5000 INJECTION, SOLUTION INTRAVENOUS; SUBCUTANEOUS EVERY 8 HOURS
Status: DISCONTINUED | OUTPATIENT
Start: 2024-01-31 | End: 2024-02-01

## 2024-01-31 RX ORDER — OXYCODONE HYDROCHLORIDE 10 MG/1
10 TABLET ORAL
Status: DISCONTINUED | OUTPATIENT
Start: 2024-01-31 | End: 2024-02-05 | Stop reason: HOSPADM

## 2024-01-31 RX ORDER — EPHEDRINE SULFATE 50 MG/ML
10 INJECTION, SOLUTION INTRAVENOUS
Status: DISCONTINUED | OUTPATIENT
Start: 2024-01-31 | End: 2024-01-31 | Stop reason: HOSPADM

## 2024-01-31 RX ORDER — CEPHALEXIN 500 MG/1
500 CAPSULE ORAL 4 TIMES DAILY
COMMUNITY

## 2024-01-31 RX ORDER — ACETAMINOPHEN 500 MG
1000 TABLET ORAL EVERY 6 HOURS
Status: DISCONTINUED | OUTPATIENT
Start: 2024-01-31 | End: 2024-02-05 | Stop reason: HOSPADM

## 2024-01-31 RX ORDER — KETOROLAC TROMETHAMINE 15 MG/ML
15 INJECTION, SOLUTION INTRAMUSCULAR; INTRAVENOUS EVERY 6 HOURS
Status: DISPENSED | OUTPATIENT
Start: 2024-01-31 | End: 2024-02-03

## 2024-01-31 RX ORDER — PROTAMINE SULFATE 10 MG/ML
INJECTION, SOLUTION INTRAVENOUS PRN
Status: DISCONTINUED | OUTPATIENT
Start: 2024-01-31 | End: 2024-01-31 | Stop reason: SURG

## 2024-01-31 RX ORDER — HALOPERIDOL 5 MG/ML
1 INJECTION INTRAMUSCULAR EVERY 6 HOURS PRN
Status: DISCONTINUED | OUTPATIENT
Start: 2024-01-31 | End: 2024-02-05 | Stop reason: HOSPADM

## 2024-01-31 RX ORDER — LABETALOL HYDROCHLORIDE 5 MG/ML
10 INJECTION, SOLUTION INTRAVENOUS EVERY 4 HOURS PRN
Status: DISCONTINUED | OUTPATIENT
Start: 2024-01-31 | End: 2024-02-05 | Stop reason: HOSPADM

## 2024-01-31 RX ORDER — ONDANSETRON 2 MG/ML
4 INJECTION INTRAMUSCULAR; INTRAVENOUS
Status: DISCONTINUED | OUTPATIENT
Start: 2024-01-31 | End: 2024-01-31 | Stop reason: HOSPADM

## 2024-01-31 RX ADMIN — FENTANYL CITRATE 50 MCG: 50 INJECTION, SOLUTION INTRAMUSCULAR; INTRAVENOUS at 13:07

## 2024-01-31 RX ADMIN — FENTANYL CITRATE 100 MCG: 50 INJECTION, SOLUTION INTRAMUSCULAR; INTRAVENOUS at 07:41

## 2024-01-31 RX ADMIN — DEXAMETHASONE SODIUM PHOSPHATE 8 MG: 4 INJECTION INTRA-ARTICULAR; INTRALESIONAL; INTRAMUSCULAR; INTRAVENOUS; SOFT TISSUE at 07:49

## 2024-01-31 RX ADMIN — OXYCODONE HYDROCHLORIDE 10 MG: 5 SOLUTION ORAL at 12:48

## 2024-01-31 RX ADMIN — HYDROMORPHONE HYDROCHLORIDE 0.2 MG: 1 INJECTION, SOLUTION INTRAMUSCULAR; INTRAVENOUS; SUBCUTANEOUS at 13:44

## 2024-01-31 RX ADMIN — KETOROLAC TROMETHAMINE 15 MG: 15 INJECTION, SOLUTION INTRAMUSCULAR; INTRAVENOUS at 23:57

## 2024-01-31 RX ADMIN — HYDROMORPHONE HYDROCHLORIDE 0.4 MG: 1 INJECTION, SOLUTION INTRAMUSCULAR; INTRAVENOUS; SUBCUTANEOUS at 13:16

## 2024-01-31 RX ADMIN — EPHEDRINE SULFATE 10 MG: 50 INJECTION, SOLUTION INTRAVENOUS at 07:46

## 2024-01-31 RX ADMIN — LIDOCAINE HYDROCHLORIDE 60 MG: 20 INJECTION, SOLUTION EPIDURAL; INFILTRATION; INTRACAUDAL at 07:41

## 2024-01-31 RX ADMIN — EPHEDRINE SULFATE 10 MG: 50 INJECTION, SOLUTION INTRAVENOUS at 08:02

## 2024-01-31 RX ADMIN — ROCURONIUM BROMIDE 20 MG: 50 INJECTION, SOLUTION INTRAVENOUS at 09:11

## 2024-01-31 RX ADMIN — CEFAZOLIN 2 G: 1 INJECTION, POWDER, FOR SOLUTION INTRAMUSCULAR; INTRAVENOUS at 11:28

## 2024-01-31 RX ADMIN — ROCURONIUM BROMIDE 20 MG: 50 INJECTION, SOLUTION INTRAVENOUS at 10:39

## 2024-01-31 RX ADMIN — PROTAMINE SULFATE 50 MG: 10 INJECTION, SOLUTION INTRAVENOUS at 11:07

## 2024-01-31 RX ADMIN — HEPARIN SODIUM 5000 UNITS: 5000 INJECTION, SOLUTION INTRAVENOUS; SUBCUTANEOUS at 23:57

## 2024-01-31 RX ADMIN — SUGAMMADEX 200 MG: 100 INJECTION, SOLUTION INTRAVENOUS at 12:18

## 2024-01-31 RX ADMIN — CEFAZOLIN 2 G: 1 INJECTION, POWDER, FOR SOLUTION INTRAMUSCULAR; INTRAVENOUS at 07:41

## 2024-01-31 RX ADMIN — HEPARIN SODIUM 9000 UNITS: 1000 INJECTION, SOLUTION INTRAVENOUS; SUBCUTANEOUS at 10:17

## 2024-01-31 RX ADMIN — ROCURONIUM BROMIDE 80 MG: 50 INJECTION, SOLUTION INTRAVENOUS at 07:41

## 2024-01-31 RX ADMIN — PROPOFOL 150 MG: 10 INJECTION, EMULSION INTRAVENOUS at 07:41

## 2024-01-31 RX ADMIN — FENTANYL CITRATE 50 MCG: 50 INJECTION, SOLUTION INTRAMUSCULAR; INTRAVENOUS at 08:25

## 2024-01-31 RX ADMIN — FENTANYL CITRATE 50 MCG: 50 INJECTION, SOLUTION INTRAMUSCULAR; INTRAVENOUS at 12:48

## 2024-01-31 RX ADMIN — HEPARIN SODIUM 5000 UNITS: 5000 INJECTION, SOLUTION INTRAVENOUS; SUBCUTANEOUS at 17:18

## 2024-01-31 RX ADMIN — HYDROMORPHONE HYDROCHLORIDE 0.5 MG: 2 INJECTION INTRAMUSCULAR; INTRAVENOUS; SUBCUTANEOUS at 09:46

## 2024-01-31 RX ADMIN — SODIUM CHLORIDE: 9 INJECTION, SOLUTION INTRAVENOUS at 17:23

## 2024-01-31 RX ADMIN — ATORVASTATIN CALCIUM 40 MG: 40 TABLET, FILM COATED ORAL at 17:18

## 2024-01-31 RX ADMIN — HYDROMORPHONE HYDROCHLORIDE 0.2 MG: 1 INJECTION, SOLUTION INTRAMUSCULAR; INTRAVENOUS; SUBCUTANEOUS at 13:33

## 2024-01-31 RX ADMIN — ONDANSETRON 4 MG: 2 INJECTION INTRAMUSCULAR; INTRAVENOUS at 11:42

## 2024-01-31 RX ADMIN — FENTANYL CITRATE 50 MCG: 50 INJECTION, SOLUTION INTRAMUSCULAR; INTRAVENOUS at 09:10

## 2024-01-31 RX ADMIN — ACETAMINOPHEN 1000 MG: 500 TABLET ORAL at 23:58

## 2024-01-31 RX ADMIN — SODIUM CHLORIDE, POTASSIUM CHLORIDE, SODIUM LACTATE AND CALCIUM CHLORIDE: 600; 310; 30; 20 INJECTION, SOLUTION INTRAVENOUS at 06:30

## 2024-01-31 RX ADMIN — HYDROMORPHONE HYDROCHLORIDE 0.5 MG: 2 INJECTION INTRAMUSCULAR; INTRAVENOUS; SUBCUTANEOUS at 11:28

## 2024-01-31 ASSESSMENT — COGNITIVE AND FUNCTIONAL STATUS - GENERAL
WALKING IN HOSPITAL ROOM: A LITTLE
DRESSING REGULAR LOWER BODY CLOTHING: A LITTLE
CLIMB 3 TO 5 STEPS WITH RAILING: A LITTLE
MOBILITY SCORE: 21
DAILY ACTIVITIY SCORE: 23
SUGGESTED CMS G CODE MODIFIER MOBILITY: CJ
SUGGESTED CMS G CODE MODIFIER DAILY ACTIVITY: CI
STANDING UP FROM CHAIR USING ARMS: A LITTLE

## 2024-01-31 ASSESSMENT — FIBROSIS 4 INDEX: FIB4 SCORE: 1.19

## 2024-01-31 ASSESSMENT — LIFESTYLE VARIABLES
EVER FELT BAD OR GUILTY ABOUT YOUR DRINKING: NO
HAVE YOU EVER FELT YOU SHOULD CUT DOWN ON YOUR DRINKING: NO
HAVE PEOPLE ANNOYED YOU BY CRITICIZING YOUR DRINKING: NO
TOTAL SCORE: 0
TOTAL SCORE: 0
ALCOHOL_USE: NO
TOTAL SCORE: 0
CONSUMPTION TOTAL: INCOMPLETE
EVER HAD A DRINK FIRST THING IN THE MORNING TO STEADY YOUR NERVES TO GET RID OF A HANGOVER: NO

## 2024-01-31 ASSESSMENT — PAIN DESCRIPTION - PAIN TYPE
TYPE: SURGICAL PAIN

## 2024-01-31 ASSESSMENT — PATIENT HEALTH QUESTIONNAIRE - PHQ9
1. LITTLE INTEREST OR PLEASURE IN DOING THINGS: NOT AT ALL
SUM OF ALL RESPONSES TO PHQ9 QUESTIONS 1 AND 2: 0
2. FEELING DOWN, DEPRESSED, IRRITABLE, OR HOPELESS: NOT AT ALL

## 2024-01-31 NOTE — PROGRESS NOTES
Medication history reviewed with PT at [facility] is complete per PT reporting    Allergies reviewed.     PT was on Cephalexin 500mg 5 day course started on 01/03/2024. Last dose was 01/08/2024.    Patient is not taking anticoagulants.    Preferred pharmacy for this visit - [facility] on [address] ([phone])

## 2024-01-31 NOTE — ANESTHESIA PREPROCEDURE EVALUATION
Case: [id number] Date/Time: 01/31/24 0715    Procedure: RIGHT FEMORAL PERONEAL BYPASS    Pre-op diagnosis: PERIPHERAL ARTERIAL DISEASE    Location: [facility] OR  / SURGERY [facility]    Surgeons: Brian Cameron M.D.            Relevant Problems   ANESTHESIA   (positive) Sleep apnea      PULMONARY   (positive) Chronic bronchitis (HCC)      CARDIAC   (positive) PAD (peripheral artery disease) (HCC)      Other   (positive) Left below-knee amputee (HCC)   (positive) Tobacco use disorder       Physical Exam    Airway   Mallampati: II  TM distance: >3 FB  Neck ROM: full       Cardiovascular - normal exam  Rhythm: regular  Rate: normal  (-) murmur     Dental - normal exam           Pulmonary - normal exam  Breath sounds clear to auscultation     Abdominal    Neurological - normal exam                   Anesthesia Plan    ASA 3   ASA physical status 3 criteria: COPD - poorly controlled    Plan - general       Airway plan will be ETT          Induction: intravenous    Postoperative Plan: Postoperative administration of opioids is intended.    Pertinent diagnostic labs and testing reviewed    Informed Consent:    Anesthetic plan and risks discussed with patient.    Use of blood products discussed with: patient whom consented to blood products.

## 2024-01-31 NOTE — ANESTHESIA PROCEDURE NOTES
Airway    Date/Time: 1/31/2024 7:41 AM    Performed by: Kj Lewis M.D.  Authorized by: Kj Lewis M.D.    Location:  OR  Urgency:  Elective  Difficult Airway: No    Indications for Airway Management:  Anesthesia      Spontaneous Ventilation: absent    Sedation Level:  Deep  Preoxygenated: Yes    Patient Position:  Sniffing  Mask Difficulty Assessment:  1 - vent by mask  Final Airway Type:  Endotracheal airway  Final Endotracheal Airway:  ETT  Cuffed: Yes    Technique Used for Successful ETT Placement:  Direct laryngoscopy  Devices/Methods Used in Placement:  Intubating stylet    Insertion Site:  Oral  Blade Type:  Demetri  Laryngoscope Blade/Videolaryngoscope Blade Size:  3  ETT Size (mm):  7.5  Measured from:  Teeth  ETT to Teeth (cm):  23  Placement Verified by: auscultation and capnometry    Cormack-Lehane Classification:  Grade I - full view of glottis  Number of Attempts at Approach:  1

## 2024-01-31 NOTE — H&P
VASCULAR SURGERY SERVICE  H&P    -------------------------------------------------------------------------------------------------  Date: 1/31/2024    Surgeon: Brian Cameron MD  -------------------------------------------------------------------------------------------------    HPI:  This is a 63 y.o. male who is presenting today for elective surgery.    No specific complaints at this time. Questions addressed.      Past Medical History:   Diagnosis Date    Adenomatous colon polyp 11/2018    multiple, recall colonoscopy in 3 years    Allergic rhinitis 02/03/2022    Amputation of left great toe ([facility]) 09/15/2021    Blood clotting disorder ([facility])     Left leg DVT    BPH with obstruction/lower urinary tract symptoms     Cervical spine fracture ([facility])     Chronic bronchitis ([facility])     Current every day smoker 06/19/2018    Erectile dysfunction 06/19/2018    Fracture of vertebra without spinal injury 02/03/2022    Gangrene of toe ([facility]) 09/15/2021    Hammer toe     Heart burn     Herpes     Hyperplasia of prostate 02/03/2022    Hypertension     Insomnia     Left below-knee amputee ([facility]) 11/09/2022    Mixed hyperlipidemia 09/06/2018    Osteomyelitis ([facility]) 02/08/2022    Added automatically from request for surgery [id number]    PAD (peripheral artery disease) ([facility])     Pneumonia     H/O    Polycythemia 09/06/2018    Prehypertension 06/19/2018    Seasonal allergic rhinitis     Sleep apnea     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    Trimalleolar fracture of ankle, closed 11/16/2021    Added automatically from request for surgery [id number]       Past Surgical History:   Procedure Laterality Date    ILIAC ANGIOPLASTY WITH STENT Right 11/29/2023    Procedure: RIGHT ILIAC STENT INSERTION;  Surgeon: Brian Cameron M.D.;  Location: SURGERY [facility];  Service: Vascular    FEMORAL ENDARTERECTOMY Right 11/29/2023    Procedure: ENDARTERECTOMY, FEMORAL;  Surgeon: Brian Cameron M.D.;  Location: SURGERY  [facility];  Service: Vascular    PB AMPUTATION TOE,MT-P JT Left 2/11/2022    Procedure: AMPUTATION, TOE, SECOND;  Surgeon: Raymond Knox M.D.;  Location: [facility];  Service: Orthopedics    PB ARTHRODESIS,ANKLE,OPEN Left 11/19/2021    Procedure: FUSION, JOINT, ANKLE;  Surgeon: Raymond Knox M.D.;  Location: [facility];  Service: Orthopedics    FEMORAL POPLITEAL BYPASS Left 3/14/2021    Procedure: Revision Left Femoral to Popliteal Bypass;  Surgeon: Brian Cameron M.D.;  Location: SURGERY [facility];  Service: Vascular    ANGIOGRAM Left 3/14/2021    Procedure: ANGIOGRAM;  Surgeon: Brian Cameron M.D.;  Location: [facility];  Service: Vascular    THROMBECTOMY Left 3/14/2021    Procedure: THROMBECTOMY;  Surgeon: Brian Cameron M.D.;  Location: [facility];  Service: Vascular    FEMORAL POPLITEAL BYPASS Left 3/13/2021    Procedure: CREATION, BYPASS, ARTERIAL, FEMORAL TO POPLITEAL, USING GRAFT;  Surgeon: Brian Cameron M.D.;  Location: [facility];  Service: Vascular    ANGIOGRAM Left 3/13/2021    Procedure: ANGIOGRAM;  Surgeon: Brian Cameron M.D.;  Location: [facility];  Service: Vascular    IRRIGATION & DEBRIDEMENT GENERAL Left 3/13/2021    Procedure: IRRIGATION AND DEBRIDEMENT, WOUND;  Surgeon: Brian Cameron M.D.;  Location: [facility];  Service: Vascular    HAMMERTOE CORRECTION         Current Facility-Administered Medications   Medication Dose Route Frequency Provider Last Rate Last Admin    lidocaine (Xylocaine) 1 % injection 0.5 mL  0.5 mL Intradermal Once PRN Brian Cameron M.D.        lactated ringers infusion   Intravenous Continuous Brian Cameron M.D. 10 mL/hr at 01/31/24 0630 New Bag at 01/31/24 0630       Social History     Socioeconomic History    Marital status:      Spouse name: Not on file    Number of children: Not on file    Years of  "education: Not on file    Highest education level: Not on file   Occupational History    Not on file   Tobacco Use    Smoking status: Every Day     Current packs/day: 1.00     Average packs/day: 1 pack/day for 44.1 years (44.1 ttl pk-yrs)     Types: Cigarettes     Start date: 1980    Smokeless tobacco: Never    Tobacco comments:     20 cig/day   Vaping Use    Vaping Use: Never used   Substance and Sexual Activity    Alcohol use: Yes     Alcohol/week: 1.8 oz     Types: 3 Cans of beer per week     Comment: daily    Drug use: Yes     Types: Marijuana, Inhaled    Sexual activity: Yes     Partners: Female   Other Topics Concern    Not on file   Social History Narrative    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     Diet: typically eats one meal per day, just dinner. Does drink 1-6 beers per night, sometimes up to 10 beers a night.    Not . 2 daughters, 2 grandchildren.     Hobbies: enjoys camping, golfing, bicycle.      Social Determinants of Health     Financial Resource Strain: Not on file   Food Insecurity: Not on file   Transportation Needs: Not on file   Physical Activity: Not on file   Stress: Not on file   Social Connections: Not on file   Intimate Partner Violence: Not on file   Housing Stability: Not on file       Family History   Problem Relation Age of Onset    Arthritis Other     Diabetes Mother     Cancer Father         colon or prostate     Lung Disease Father         emphysema     Diabetes Sister     Diabetes Brother     Diabetes Maternal Grandmother     Diabetes Paternal Grandfather     No Known Problems Daughter     No Known Problems Daughter     No Known Problems Grandchild     No Known Problems Grandchild        Allergies:  Patient has no known allergies.    Review of Systems:  Noncontributory except as per HPI    Physical Exam:  /72   Pulse (!) 109   Temp 36.1 °C (97 °F) (Temporal)   Resp 16   Ht 1.93 m (6' 4\")   Wt 87.1 kg (192 lb 0.3 oz)   SpO2 " 93%     Constitutional: Alert, oriented, no acute distress  HEENT:  Normocephalic and atraumatic, EOMI  Neck:   Supple, no JVD,   Cardiovascular: Regular rate and rhythm,   Pulmonary:  Good air entry bilaterally,    Abdominal:  Soft, non-tender, non-distended     Aortic impulse not widened  Musculoskeletal: No edema, no tenderness  Neurological:  CN II-XII grossly intact, no focal deficits  Skin:   Skin is warm and dry. No rash noted.  Psychiatric:  Normal mood and affect.    Labs:  Recent Labs     01/31/24 0614   WBC 11.6*   RBC 5.44   HEMOGLOBIN 17.1   HEMATOCRIT 48.8   MCV 89.7   MCH 31.4   MCHC 35.0   RDW 42.5   PLATELETCT 309   MPV 8.9*     Recent Labs     01/31/24 0614   SODIUM 135   POTASSIUM 4.2   CHLORIDE 101   CO2 22   GLUCOSE 111*   BUN 11   CREATININE 0.61   CALCIUM 10.1     Recent Labs     01/31/24 0614   INR 0.88     Recent Labs     01/31/24 0614   ASTSGOT 22   ALTSGPT 13   TBILIRUBIN 0.2   ALKPHOSPHAT 115*   GLOBULIN 3.4   INR 0.88       Assessment/Plan:  This is a 63 y.o. male here today for elective surgery: right femoral-tibial bypass    I explained the details of the operation, alternatives, and potential risks, including but not limited to bleeding, infection, and risks of anesthesia.  All questions were answered. Patient understands and agrees to proceed.      Brian Cameron MD  [facility]   ___________________________________________________________________  Patient:Antonio Cantu   MRN:[id number]   CSN:[id number]

## 2024-01-31 NOTE — ANESTHESIA TIME REPORT
Anesthesia Start and Stop Event Times       Date Time Event    1/31/2024 0703 Ready for Procedure     0737 Anesthesia Start     1233 Anesthesia Stop          Responsible Staff  01/31/24      Name Role Begin End    Kj Lewis M.D. Anesth 0737 1233          Overtime Reason:  no overtime (within assigned shift)    Comments:

## 2024-01-31 NOTE — OP REPORT
VASCULAR SURGERY SERVICE                       Operative Note  _____________________________________________________    Date: 2024    Patient: Antonio Cantu  : 1960  MRN: [id number]  _____________________________________________________      Preoperative Diagnosis:  - Peripheral arterial occlusive disease with right lower extremity arterial insufficiency and rest pain and small ulcerations of the toes of the right foot    Postoperative Diagnosis:  - Peripheral arterial occlusive disease with right lower extremity arterial insufficiency and rest pain and small ulcerations of the toes of the right foot    Procedure:  - right femoral to posterior tibial artery bypass using ipsilateral reversed greater saphenous vein (91334)  - Direct sheath insertion in the right peroneal artery (63275)  - right lower extremity angiogram (30624)    _____________________________________________________    Surgeon:                                 Brian Cameron MD    Assistant:   none    Anesthesia:                             General anesthesia     EBL:                                        minimal     Heparin:                                  Systemically heparinized    Complications:                        none    Disposition:                             Tolerated well, sent to recovery in stable condition    _____________________________________________________    Procedure Summary:  Following informed consent, the patient was placed supine on the operating table, and general anesthesia was administered.  The patient was prepped and draped sterile in the usual fashion. Surgical time-out was called, and everyone was in agreement.  The patient did receive preoperative prophylactic antibiotics.      Based on previous angiogram performed about 2 months prior, my intention was to use the peroneal artery as the distal bypass target.  Therefore I made a longitudinal incision on the medial aspect of the right lower  leg and identified the saphenous vein which was mobilized circumferentially and reflected out of the way.  I then entered into the posterior compartment of the leg and identified the peroneal artery which was encircled proximally and distally with Vesseloops.  I inserted a microsheath into the artery and I performed a runoff angiography.  This showed that the peroneal artery would serve as a suitable distal bypass target however the posterior tibial artery reconstituted near the ankle and had a much larger diameter and I chose to use this is the distal target and instead.  The micro sheath was removed and the peroneal artery was closed with a 6-0 Prolene suture.    At this point I set about mobilizing the entire length of the right greater saphenous vein.  The vein was mobilized from the groin incision all the way down to the ankle circumferentially and the side branches were divided with clips and ties.  Once the vein was fully mobilized it was pressurized and appeared to be of adequate length and quality for bypass.  At this point I exposed the posterior tibial artery just above the ankle through a medial longitudinal incision.  The artery was encircled proximally and distally with Vesseloops.  Also, using the vein harvest incision I exposed the right common femoral artery and it was encircled proximally and distally with Vesseloops.  I then used a long Lynsey-Wick tunneler to pass plastic tubing in an anatomic tunnel from the groin down to the posterior tibial incision.    The patient was systemically heparinized and then the right common femoral artery was clamped and opened longitudinally a angela was created in the vein graft, with care taken to make sure the vein was in a reversed configuration, and then the vein was anastomosed to the common femoral artery in an end-to-side fashion using a running 5-0 Prolene suture.  The anastomosis was pressurized and found to be hemostatic and then the vein was flushed  through to the end and there was good blood flow through the vein graft.  When the end of the graft was clamped there was a strong pulse throughout the length of the vein graft.    At this point the vein graft was brought through the tunnel using a long alligator forceps.  Once the vein conduit reached the posterior tibial incision the graft was pressurized and there was good flow at the end of the graft.  At this point the posterior tibial artery was clamped and opened longitudinally and then a angela was created on the distal end of the bypass graft and it was anastomosed to the posterior tibial artery in an end-to-side fashion using a running 6-0 Prolene suture line.  The anastomosis was flushed and irrigated prior to completion and once complete it was pressurized and found to be hemostatic and then outflow was restored.  There was a brisk Doppler signal in the posterior tibial artery at this point.  The heparin was reversed with protamine.    At this point I set about closing the incisions.  The incisions were irrigated and hemostasis was achieved with cautery and topical hemostatic.  The incisions were closed with multiple layers of absorbable suture and staples for the skin.  Just before I was done closing the thigh incision I reassessed Doppler evaluation of the posterior tibial artery and it appeared to be dampened suggesting that there may be a problem with bypass graft, potentially the incision in the groin may have been closed too tight.  I removed the stitches from the groin to allow the vein graft more room to expand and there was a improvement in the Doppler signal at the ankle.  However just to be sure I reopened the posterior tibial incision and evaluated the vein graft directly and it appeared that the pulsation of the graft was somewhat dampened still.  I decided to do an angiogram.  I inserted a microsheath and the proximal end of the bypass and perform angiogram from the right common femoral  artery all the way down the right leg.  The bypass graft appears to be widely patent however flow does preferentially go down the profunda and not into the bypass graft.  I continue the angiogram all the way down the bypass and it appears widely patent all the way down to the distal anastomosis.  There was poor flow into the distal posterior tibial artery suggesting a problem with the distal anastomosis.  I therefore clamped proximal and distal to the anastomosis and I opened the vein bypass transversely and I swept the distal anastomosis with a #3 Harinder and retrieved no thrombus.  The Harinder passed into and across the anastomosis easily and even when the Harinder was inflated it removed easily through the anastomosis indicating that the distal anastomosis is widely patent.  I even further interrogated the anastomosis with a 3.5 mm vessel dilator which passed easily across the anastomosis.  There were no findings to indicate why the angiogram showed poor flow across the distal anastomosis as it was clearly widely patent.  It is possible there could be competitive flow as there was a fair amount of backbleeding from the posterior tibial artery when I was interrogated the distal anastomosis.  The anastomosis was flushed and then the graftotomy was closed with interrupted 6-0 Prolene sutures and outflow was restored.  There was a brisk Doppler signal in the posterior tibial artery distal to the anastomosis.    At this point the incisions were irrigated and suctioned clean and more topical hemostatic was applied.  The incisions were closed with multiple layers of absorbable suture and staples for the skin and the incision was protected with a Prevena bandage.  All counts were correct.  Patient tolerated the procedure well and he was sent to recovery in stable condition.      Brian Cameron MD  [facility]

## 2024-01-31 NOTE — ANESTHESIA POSTPROCEDURE EVALUATION
Patient: Antonio Cantu    Procedure Summary       Date: 01/31/24 Room / Location: [place] [address] / SURGERY [facility]    Anesthesia Start: 0737 Anesthesia Stop: 1233    Procedures:       RIGHT FEMORAL PERONEAL BYPASS (Right: Leg Lower)      ANGIOGRAM RIGHT LOWER EXTREMITY (Right: Leg Lower) Diagnosis: (PERIPHERAL ARTERIAL DISEASE)    Surgeons: Brian Cameron M.D. Responsible Provider: Kj Lewis M.D.    Anesthesia Type: general ASA Status: 3            Final Anesthesia Type: general  Last vitals  BP   Blood Pressure: 114/59    Temp   36.9 °C (98.4 °F)    Pulse   66   Resp   15    SpO2   91 %      Anesthesia Post Evaluation    Patient location during evaluation: PACU  Patient participation: complete - patient participated  Level of consciousness: awake and alert    Airway patency: patent  Anesthetic complications: no  Cardiovascular status: hemodynamically stable  Respiratory status: acceptable  Hydration status: euvolemic    PONV: none          No notable events documented.     Nurse Pain Score: 5 (NPRS)

## 2024-02-01 LAB
ANION GAP SERPL CALC-SCNC: 9 MMOL/L (ref 7–16)
APTT PPP: 25.9 SEC (ref 24.7–36)
BUN SERPL-MCNC: 12 MG/DL (ref 8–22)
CALCIUM SERPL-MCNC: 9.4 MG/DL (ref 8.5–10.5)
CHLORIDE SERPL-SCNC: 101 MMOL/L (ref 96–112)
CO2 SERPL-SCNC: 25 MMOL/L (ref 20–33)
CREAT SERPL-MCNC: 0.61 MG/DL (ref 0.5–1.4)
ERYTHROCYTE [DISTWIDTH] IN BLOOD BY AUTOMATED COUNT: 44.3 FL (ref 35.9–50)
GFR SERPLBLD CREATININE-BSD FMLA CKD-EPI: 107 ML/MIN/1.73 M 2
GLUCOSE SERPL-MCNC: 205 MG/DL (ref 65–99)
HCT VFR BLD AUTO: 44.5 % (ref 42–52)
HGB BLD-MCNC: 14.9 G/DL (ref 14–18)
INR PPP: 0.96 (ref 0.87–1.13)
MCH RBC QN AUTO: 31.2 PG (ref 27–33)
MCHC RBC AUTO-ENTMCNC: 33.5 G/DL (ref 32.3–36.5)
MCV RBC AUTO: 93.1 FL (ref 81.4–97.8)
PLATELET # BLD AUTO: 293 K/UL (ref 164–446)
PMV BLD AUTO: 9.6 FL (ref 9–12.9)
POTASSIUM SERPL-SCNC: 3.9 MMOL/L (ref 3.6–5.5)
PROTHROMBIN TIME: 12.9 SEC (ref 12–14.6)
RBC # BLD AUTO: 4.78 M/UL (ref 4.7–6.1)
SODIUM SERPL-SCNC: 135 MMOL/L (ref 135–145)
UFH PPP CHRO-ACNC: 0.17 IU/ML
UFH PPP CHRO-ACNC: <0.1 IU/ML
WBC # BLD AUTO: 16.6 K/UL (ref 4.8–10.8)

## 2024-02-01 PROCEDURE — 85610 PROTHROMBIN TIME: CPT

## 2024-02-01 PROCEDURE — 36415 COLL VENOUS BLD VENIPUNCTURE: CPT

## 2024-02-01 PROCEDURE — A9270 NON-COVERED ITEM OR SERVICE: HCPCS | Performed by: SURGERY

## 2024-02-01 PROCEDURE — 85520 HEPARIN ASSAY: CPT

## 2024-02-01 PROCEDURE — 85730 THROMBOPLASTIN TIME PARTIAL: CPT

## 2024-02-01 PROCEDURE — 770001 HCHG ROOM/CARE - MED/SURG/GYN PRIV*

## 2024-02-01 PROCEDURE — 700111 HCHG RX REV CODE 636 W/ 250 OVERRIDE (IP): Performed by: SURGERY

## 2024-02-01 PROCEDURE — 85027 COMPLETE CBC AUTOMATED: CPT

## 2024-02-01 PROCEDURE — 700102 HCHG RX REV CODE 250 W/ 637 OVERRIDE(OP): Performed by: SURGERY

## 2024-02-01 PROCEDURE — 80048 BASIC METABOLIC PNL TOTAL CA: CPT

## 2024-02-01 PROCEDURE — 99406 BEHAV CHNG SMOKING 3-10 MIN: CPT

## 2024-02-01 RX ORDER — HEPARIN SODIUM 1000 [USP'U]/ML
40 INJECTION, SOLUTION INTRAVENOUS; SUBCUTANEOUS PRN
Status: DISCONTINUED | OUTPATIENT
Start: 2024-02-01 | End: 2024-02-03

## 2024-02-01 RX ORDER — HEPARIN SODIUM 5000 [USP'U]/100ML
0-30 INJECTION, SOLUTION INTRAVENOUS CONTINUOUS
Status: DISCONTINUED | OUTPATIENT
Start: 2024-02-01 | End: 2024-02-03

## 2024-02-01 RX ADMIN — ATORVASTATIN CALCIUM 40 MG: 40 TABLET, FILM COATED ORAL at 17:44

## 2024-02-01 RX ADMIN — KETOROLAC TROMETHAMINE 15 MG: 15 INJECTION, SOLUTION INTRAMUSCULAR; INTRAVENOUS at 14:19

## 2024-02-01 RX ADMIN — HEPARIN SODIUM 5000 UNITS: 5000 INJECTION, SOLUTION INTRAVENOUS; SUBCUTANEOUS at 06:10

## 2024-02-01 RX ADMIN — HEPARIN SODIUM 3500 UNITS: 1000 INJECTION, SOLUTION INTRAVENOUS; SUBCUTANEOUS at 23:34

## 2024-02-01 RX ADMIN — ACETAMINOPHEN 1000 MG: 500 TABLET ORAL at 14:18

## 2024-02-01 RX ADMIN — HEPARIN SODIUM 18 UNITS/KG/HR: 5000 INJECTION, SOLUTION INTRAVENOUS at 15:17

## 2024-02-01 ASSESSMENT — LIFESTYLE VARIABLES
TOTAL SCORE: 0
DOES PATIENT WANT TO STOP DRINKING: NO
EVER FELT BAD OR GUILTY ABOUT YOUR DRINKING: NO
HAVE YOU EVER FELT YOU SHOULD CUT DOWN ON YOUR DRINKING: NO
HOW MANY TIMES IN THE PAST YEAR HAVE YOU HAD 5 OR MORE DRINKS IN A DAY: 0
EVER HAD A DRINK FIRST THING IN THE MORNING TO STEADY YOUR NERVES TO GET RID OF A HANGOVER: NO
ON A TYPICAL DAY WHEN YOU DRINK ALCOHOL HOW MANY DRINKS DO YOU HAVE: 0
CONSUMPTION TOTAL: NEGATIVE
PACK_YEARS: 44.1
AVERAGE NUMBER OF DAYS PER WEEK YOU HAVE A DRINK CONTAINING ALCOHOL: 0
TOTAL SCORE: 0
ALCOHOL_USE: NO
TOTAL SCORE: 0
HAVE PEOPLE ANNOYED YOU BY CRITICIZING YOUR DRINKING: NO

## 2024-02-01 ASSESSMENT — PAIN DESCRIPTION - PAIN TYPE
TYPE: SURGICAL PAIN
TYPE: SURGICAL PAIN

## 2024-02-01 NOTE — PROGRESS NOTES
Vascular surgery    Postop day 1 status post femoral to posterior tibial artery bypass using reversed ipsilateral saphenous vein    Patient's foot is warm  Signal present posterior tibial artery     Patient has no complaints with respect to pain in the foot    Leatha in place    Okay to increase activity and get out of bed    Vascular surgery following    Merrill Dias MD

## 2024-02-01 NOTE — PROGRESS NOTES
4 Eyes Skin Assessment Completed by SHELLEY Santacruz and SHELLEY Leyva.    Head WDL  Ears WDL  Nose WDL  Mouth WDL  Neck WDL  Breast/Chest WDL  Shoulder Blades WDL  Spine WDL  (R) Arm/Elbow/Hand WDL  (L) Arm/Elbow/Hand WDL  Abdomen WDL  Groin Redness, Scar, and Incision, incision from R leg reaches groin  Scrotum/Coccyx/Buttocks Redness and Blanching, flaky crusted mole on L buttock   (R) Leg Incision down leg, WV in place  (L) Leg BKA, Scar and Scab    (R) Heel/Foot/Toe Redness, Blanching, and Ulcer(s)              (L) Heel/Foot/Toe BKA          Devices In Places Pulse Ox, Lebron, and Nasal Cannula      Interventions In Place NC W/Ear Foams, Pillows, Heels Loaded W/Pillows, and Pressure Redistribution Mattress    Possible Skin Injury Yes    Pictures Uploaded Into Epic Yes  Wound Consult Placed Yes  RN Wound Prevention Protocol Ordered No

## 2024-02-01 NOTE — PROGRESS NOTES
1500 - Pt transferred to unit via gurney. Pt scoot by self to bed. Previous L BKA, has home crutches and prosthetic.     R pedal pulse found by doppler, marked. Leg wrapped in ace wrap, CDI, prevena in place. Has baseline loss of sensation in RLE. Alert and oriented, weaned to 1L NC, baseline room air. Bed alarm on

## 2024-02-01 NOTE — PROGRESS NOTES
"       VASCULAR SURGERY SERVICE                        Progress Note  _________________________________    Underwent right fem-distal PT yesterday.  Overall feeling well, no complaints.  I am unable to find any doppler signal in the foot, most likely the bypass is thrombosed, ironically patient reports they were able to find signals all night.    /82   Pulse 74   Temp 37.2 °C (99 °F) (Temporal)   Resp 19   Ht 1.93 m (6' 4\")   Wt 87.1 kg (192 lb 0.3 oz)   SpO2 93%   BMI 23.37 kg/m²     RLE prevena CDI, minimal output,  No detectable doppler signals - bypass likely thrombosed    A/P)  Likely bypass thrombosis. Recommend reoperation to perform thrombectomy and revision. Patient just finished lunch before my evaluation so planning to schedule surgery for tomorrow, time TBD based on OR availability.    Will start heparin gtt in the meantime.    NPO except meds after midnight    Brian Cameron MD  [facility]   Voalte preferred or call my office [phone]  __________________________________________________  Patient:Antonio Cantu   MRN:[id number]       "

## 2024-02-01 NOTE — CARE PLAN
The patient is Stable - Low risk of patient condition declining or worsening    Shift Goals  Clinical Goals: CMS checks, pain control, rest  Patient Goals: rest    Progress made toward(s) clinical / shift goals:  CMS checks Q4 minimum. Pt declines need for PRN pain intervention. Medicated with scheduled medications. Resting at this time       Problem: Pain - Standard  Goal: Alleviation of pain or a reduction in pain to the patient’s comfort goal  Description: Target End Date:  Prior to discharge or change in level of care    Document on Vitals flowsheet    1.  Document pain using the appropriate pain scale per order or unit policy  2.  Educate and implement non-pharmacologic comfort measures (i.e. relaxation, distraction, massage, cold/heat therapy, etc.)  3.  Pain management medications as ordered  4.  Reassess pain after pain med administration per policy  5.  If opiods administered assess patient's response to pain medication is appropriate per POSS sedation scale  6.  Follow pain management plan developed in collaboration with patient and interdisciplinary team (including palliative care or pain specialists if applicable)  Outcome: Progressing     Problem: Fall Risk  Goal: Patient will remain free from falls  Description: Target End Date:  Prior to discharge or change in level of care    Document interventions on the Hernandez Reilly Fall Risk Assessment    1.  Assess for fall risk factors  2.  Implement fall precautions  Outcome: Progressing     Problem: Knowledge Deficit - Standard  Goal: Patient and family/care givers will demonstrate understanding of plan of care, disease process/condition, diagnostic tests and medications  Description: Target End Date:  1-3 days or as soon as patient condition allows    Document in Patient Education    1.  Patient and family/caregiver oriented to unit, equipment, visitation policy and means for communicating concern  2.  Complete/review Learning Assessment  3.  Assess knowledge  level of disease process/condition, treatment plan, diagnostic tests and medications  4.  Explain disease process/condition, treatment plan, diagnostic tests and medications  Outcome: Progressing       Patient is not progressing towards the following goals:

## 2024-02-02 ENCOUNTER — APPOINTMENT (OUTPATIENT)
Dept: RADIOLOGY | Facility: MEDICAL CENTER | Age: 64
DRG: 253 | End: 2024-02-02
Attending: SURGERY
Payer: OTHER MISCELLANEOUS

## 2024-02-02 ENCOUNTER — ANESTHESIA EVENT (OUTPATIENT)
Dept: SURGERY | Facility: MEDICAL CENTER | Age: 64
DRG: 253 | End: 2024-02-02
Payer: OTHER MISCELLANEOUS

## 2024-02-02 LAB
UFH PPP CHRO-ACNC: 0.26 IU/ML
UFH PPP CHRO-ACNC: 0.33 IU/ML
UFH PPP CHRO-ACNC: 0.86 IU/ML
UFH PPP CHRO-ACNC: 0.88 IU/ML

## 2024-02-02 PROCEDURE — 36415 COLL VENOUS BLD VENIPUNCTURE: CPT

## 2024-02-02 PROCEDURE — 700101 HCHG RX REV CODE 250: Performed by: SURGERY

## 2024-02-02 PROCEDURE — C1894 INTRO/SHEATH, NON-LASER: HCPCS | Performed by: SURGERY

## 2024-02-02 PROCEDURE — 160036 HCHG PACU - EA ADDL 30 MINS PHASE I: Performed by: SURGERY

## 2024-02-02 PROCEDURE — C1757 CATH, THROMBECTOMY/EMBOLECT: HCPCS | Performed by: SURGERY

## 2024-02-02 PROCEDURE — 75774 ARTERY X-RAY EACH VESSEL: CPT | Mod: RT

## 2024-02-02 PROCEDURE — 160029 HCHG SURGERY MINUTES - 1ST 30 MINS LEVEL 4: Performed by: SURGERY

## 2024-02-02 PROCEDURE — 700102 HCHG RX REV CODE 250 W/ 637 OVERRIDE(OP): Performed by: SURGERY

## 2024-02-02 PROCEDURE — 34203 REMOVAL OF LEG ARTERY CLOT: CPT | Mod: 78,RT | Performed by: SURGERY

## 2024-02-02 PROCEDURE — 770001 HCHG ROOM/CARE - MED/SURG/GYN PRIV*

## 2024-02-02 PROCEDURE — 700101 HCHG RX REV CODE 250: Performed by: ANESTHESIOLOGY

## 2024-02-02 PROCEDURE — 700111 HCHG RX REV CODE 636 W/ 250 OVERRIDE (IP): Performed by: STUDENT IN AN ORGANIZED HEALTH CARE EDUCATION/TRAINING PROGRAM

## 2024-02-02 PROCEDURE — 700101 HCHG RX REV CODE 250: Performed by: STUDENT IN AN ORGANIZED HEALTH CARE EDUCATION/TRAINING PROGRAM

## 2024-02-02 PROCEDURE — 041K0JN BYPASS RIGHT FEMORAL ARTERY TO POSTERIOR TIBIAL ARTERY WITH SYNTHETIC SUBSTITUTE, OPEN APPROACH: ICD-10-PCS | Performed by: SURGERY

## 2024-02-02 PROCEDURE — 04CR0ZZ EXTIRPATION OF MATTER FROM RIGHT POSTERIOR TIBIAL ARTERY, OPEN APPROACH: ICD-10-PCS | Performed by: SURGERY

## 2024-02-02 PROCEDURE — 160048 HCHG OR STATISTICAL LEVEL 1-5: Performed by: SURGERY

## 2024-02-02 PROCEDURE — 85347 COAGULATION TIME ACTIVATED: CPT

## 2024-02-02 PROCEDURE — C1768 GRAFT, VASCULAR: HCPCS | Performed by: SURGERY

## 2024-02-02 PROCEDURE — 160035 HCHG PACU - 1ST 60 MINS PHASE I: Performed by: SURGERY

## 2024-02-02 PROCEDURE — 110454 HCHG SHELL REV 250: Performed by: SURGERY

## 2024-02-02 PROCEDURE — 700102 HCHG RX REV CODE 250 W/ 637 OVERRIDE(OP): Performed by: ANESTHESIOLOGY

## 2024-02-02 PROCEDURE — 35666 BPG FEM-ANT TIB PST TIB/PRNL: CPT | Mod: 78 | Performed by: SURGERY

## 2024-02-02 PROCEDURE — 160041 HCHG SURGERY MINUTES - EA ADDL 1 MIN LEVEL 4: Performed by: SURGERY

## 2024-02-02 PROCEDURE — 160002 HCHG RECOVERY MINUTES (STAT): Performed by: SURGERY

## 2024-02-02 PROCEDURE — 700117 HCHG RX CONTRAST REV CODE 255: Performed by: SURGERY

## 2024-02-02 PROCEDURE — 160009 HCHG ANES TIME/MIN: Performed by: SURGERY

## 2024-02-02 PROCEDURE — A9270 NON-COVERED ITEM OR SERVICE: HCPCS | Performed by: ANESTHESIOLOGY

## 2024-02-02 PROCEDURE — 700111 HCHG RX REV CODE 636 W/ 250 OVERRIDE (IP): Mod: JZ | Performed by: ANESTHESIOLOGY

## 2024-02-02 PROCEDURE — 700111 HCHG RX REV CODE 636 W/ 250 OVERRIDE (IP): Performed by: SURGERY

## 2024-02-02 PROCEDURE — A9270 NON-COVERED ITEM OR SERVICE: HCPCS | Performed by: SURGERY

## 2024-02-02 PROCEDURE — 700111 HCHG RX REV CODE 636 W/ 250 OVERRIDE (IP): Performed by: ANESTHESIOLOGY

## 2024-02-02 PROCEDURE — 700105 HCHG RX REV CODE 258: Performed by: ANESTHESIOLOGY

## 2024-02-02 PROCEDURE — 85520 HEPARIN ASSAY: CPT | Mod: 91

## 2024-02-02 DEVICE — GRAFT IMPRA DISTAFLO 6X80CM: Type: IMPLANTABLE DEVICE | Status: FUNCTIONAL

## 2024-02-02 DEVICE — GRAFT GORE PROPATEN 6MMX80CM: Type: IMPLANTABLE DEVICE | Status: FUNCTIONAL

## 2024-02-02 RX ORDER — OXYCODONE HCL 5 MG/5 ML
5 SOLUTION, ORAL ORAL
Status: COMPLETED | OUTPATIENT
Start: 2024-02-02 | End: 2024-02-02

## 2024-02-02 RX ORDER — DIPHENHYDRAMINE HYDROCHLORIDE 50 MG/ML
12.5 INJECTION INTRAMUSCULAR; INTRAVENOUS
Status: DISCONTINUED | OUTPATIENT
Start: 2024-02-02 | End: 2024-02-02 | Stop reason: HOSPADM

## 2024-02-02 RX ORDER — HEPARIN SODIUM,PORCINE 1000/ML
VIAL (ML) INJECTION
Status: DISCONTINUED | OUTPATIENT
Start: 2024-02-02 | End: 2024-02-02 | Stop reason: HOSPADM

## 2024-02-02 RX ORDER — EPHEDRINE SULFATE 50 MG/ML
5 INJECTION, SOLUTION INTRAVENOUS
Status: DISCONTINUED | OUTPATIENT
Start: 2024-02-02 | End: 2024-02-02 | Stop reason: HOSPADM

## 2024-02-02 RX ORDER — OXYCODONE HCL 5 MG/5 ML
10 SOLUTION, ORAL ORAL
Status: COMPLETED | OUTPATIENT
Start: 2024-02-02 | End: 2024-02-02

## 2024-02-02 RX ORDER — HYDRALAZINE HYDROCHLORIDE 20 MG/ML
5 INJECTION INTRAMUSCULAR; INTRAVENOUS
Status: DISCONTINUED | OUTPATIENT
Start: 2024-02-02 | End: 2024-02-02 | Stop reason: HOSPADM

## 2024-02-02 RX ORDER — ROCURONIUM BROMIDE 10 MG/ML
INJECTION, SOLUTION INTRAVENOUS PRN
Status: DISCONTINUED | OUTPATIENT
Start: 2024-02-02 | End: 2024-02-02 | Stop reason: SURG

## 2024-02-02 RX ORDER — LIDOCAINE HYDROCHLORIDE 20 MG/ML
INJECTION, SOLUTION EPIDURAL; INFILTRATION; INTRACAUDAL; PERINEURAL PRN
Status: DISCONTINUED | OUTPATIENT
Start: 2024-02-02 | End: 2024-02-02 | Stop reason: SURG

## 2024-02-02 RX ORDER — HALOPERIDOL 5 MG/ML
1 INJECTION INTRAMUSCULAR
Status: DISCONTINUED | OUTPATIENT
Start: 2024-02-02 | End: 2024-02-02 | Stop reason: HOSPADM

## 2024-02-02 RX ORDER — SODIUM CHLORIDE, SODIUM LACTATE, POTASSIUM CHLORIDE, CALCIUM CHLORIDE 600; 310; 30; 20 MG/100ML; MG/100ML; MG/100ML; MG/100ML
INJECTION, SOLUTION INTRAVENOUS
Status: DISCONTINUED | OUTPATIENT
Start: 2024-02-02 | End: 2024-02-02 | Stop reason: SURG

## 2024-02-02 RX ORDER — ONDANSETRON 2 MG/ML
4 INJECTION INTRAMUSCULAR; INTRAVENOUS
Status: DISCONTINUED | OUTPATIENT
Start: 2024-02-02 | End: 2024-02-02 | Stop reason: HOSPADM

## 2024-02-02 RX ORDER — HYDROMORPHONE HYDROCHLORIDE 1 MG/ML
0.4 INJECTION, SOLUTION INTRAMUSCULAR; INTRAVENOUS; SUBCUTANEOUS
Status: DISCONTINUED | OUTPATIENT
Start: 2024-02-02 | End: 2024-02-02 | Stop reason: HOSPADM

## 2024-02-02 RX ORDER — ONDANSETRON 2 MG/ML
INJECTION INTRAMUSCULAR; INTRAVENOUS PRN
Status: DISCONTINUED | OUTPATIENT
Start: 2024-02-02 | End: 2024-02-02 | Stop reason: SURG

## 2024-02-02 RX ORDER — PHENYLEPHRINE HCL IN 0.9% NACL 0.5 MG/5ML
SYRINGE (ML) INTRAVENOUS PRN
Status: DISCONTINUED | OUTPATIENT
Start: 2024-02-02 | End: 2024-02-02 | Stop reason: SURG

## 2024-02-02 RX ORDER — HEPARIN SODIUM 1000 [USP'U]/ML
INJECTION, SOLUTION INTRAVENOUS; SUBCUTANEOUS PRN
Status: DISCONTINUED | OUTPATIENT
Start: 2024-02-02 | End: 2024-02-02 | Stop reason: SURG

## 2024-02-02 RX ORDER — HYDROMORPHONE HYDROCHLORIDE 1 MG/ML
0.1 INJECTION, SOLUTION INTRAMUSCULAR; INTRAVENOUS; SUBCUTANEOUS
Status: DISCONTINUED | OUTPATIENT
Start: 2024-02-02 | End: 2024-02-02 | Stop reason: HOSPADM

## 2024-02-02 RX ORDER — HYDROMORPHONE HYDROCHLORIDE 1 MG/ML
0.2 INJECTION, SOLUTION INTRAMUSCULAR; INTRAVENOUS; SUBCUTANEOUS
Status: DISCONTINUED | OUTPATIENT
Start: 2024-02-02 | End: 2024-02-02 | Stop reason: HOSPADM

## 2024-02-02 RX ORDER — LIDOCAINE HYDROCHLORIDE 40 MG/ML
SOLUTION TOPICAL PRN
Status: DISCONTINUED | OUTPATIENT
Start: 2024-02-02 | End: 2024-02-02 | Stop reason: SURG

## 2024-02-02 RX ORDER — LABETALOL HYDROCHLORIDE 5 MG/ML
5 INJECTION, SOLUTION INTRAVENOUS
Status: DISCONTINUED | OUTPATIENT
Start: 2024-02-02 | End: 2024-02-02 | Stop reason: HOSPADM

## 2024-02-02 RX ORDER — EPHEDRINE SULFATE 50 MG/ML
INJECTION, SOLUTION INTRAVENOUS PRN
Status: DISCONTINUED | OUTPATIENT
Start: 2024-02-02 | End: 2024-02-02 | Stop reason: SURG

## 2024-02-02 RX ORDER — SODIUM CHLORIDE, SODIUM LACTATE, POTASSIUM CHLORIDE, CALCIUM CHLORIDE 600; 310; 30; 20 MG/100ML; MG/100ML; MG/100ML; MG/100ML
INJECTION, SOLUTION INTRAVENOUS CONTINUOUS
Status: DISCONTINUED | OUTPATIENT
Start: 2024-02-02 | End: 2024-02-02 | Stop reason: HOSPADM

## 2024-02-02 RX ORDER — CEFAZOLIN SODIUM 1 G/3ML
INJECTION, POWDER, FOR SOLUTION INTRAMUSCULAR; INTRAVENOUS PRN
Status: DISCONTINUED | OUTPATIENT
Start: 2024-02-02 | End: 2024-02-02 | Stop reason: SURG

## 2024-02-02 RX ORDER — DEXAMETHASONE SODIUM PHOSPHATE 4 MG/ML
INJECTION, SOLUTION INTRA-ARTICULAR; INTRALESIONAL; INTRAMUSCULAR; INTRAVENOUS; SOFT TISSUE PRN
Status: DISCONTINUED | OUTPATIENT
Start: 2024-02-02 | End: 2024-02-02 | Stop reason: SURG

## 2024-02-02 RX ADMIN — FENTANYL CITRATE 100 MCG: 50 INJECTION, SOLUTION INTRAMUSCULAR; INTRAVENOUS at 12:03

## 2024-02-02 RX ADMIN — LIDOCAINE HYDROCHLORIDE 3 ML: 20 INJECTION, SOLUTION EPIDURAL; INFILTRATION; INTRACAUDAL at 11:20

## 2024-02-02 RX ADMIN — HEPARIN SODIUM 3500 UNITS: 1000 INJECTION, SOLUTION INTRAVENOUS; SUBCUTANEOUS at 22:50

## 2024-02-02 RX ADMIN — HEPARIN SODIUM 18 UNITS/KG/HR: 5000 INJECTION, SOLUTION INTRAVENOUS at 21:31

## 2024-02-02 RX ADMIN — ACETAMINOPHEN 1000 MG: 500 TABLET ORAL at 17:40

## 2024-02-02 RX ADMIN — EPHEDRINE SULFATE 5 MG: 50 INJECTION, SOLUTION INTRAVENOUS at 13:08

## 2024-02-02 RX ADMIN — SUGAMMADEX 200 MG: 100 INJECTION, SOLUTION INTRAVENOUS at 13:49

## 2024-02-02 RX ADMIN — CEFAZOLIN 2 G: 1 INJECTION, POWDER, FOR SOLUTION INTRAMUSCULAR; INTRAVENOUS at 11:26

## 2024-02-02 RX ADMIN — KETOROLAC TROMETHAMINE 15 MG: 15 INJECTION, SOLUTION INTRAMUSCULAR; INTRAVENOUS at 17:40

## 2024-02-02 RX ADMIN — SODIUM CHLORIDE, POTASSIUM CHLORIDE, SODIUM LACTATE AND CALCIUM CHLORIDE: 600; 310; 30; 20 INJECTION, SOLUTION INTRAVENOUS at 12:27

## 2024-02-02 RX ADMIN — DOCUSATE SODIUM 100 MG: 100 CAPSULE, LIQUID FILLED ORAL at 17:40

## 2024-02-02 RX ADMIN — ATORVASTATIN CALCIUM 40 MG: 40 TABLET, FILM COATED ORAL at 17:40

## 2024-02-02 RX ADMIN — ROCURONIUM BROMIDE 20 MG: 50 INJECTION, SOLUTION INTRAVENOUS at 13:23

## 2024-02-02 RX ADMIN — HEPARIN SODIUM 20 UNITS/KG/HR: 5000 INJECTION, SOLUTION INTRAVENOUS at 05:20

## 2024-02-02 RX ADMIN — PROPOFOL 100 MG: 10 INJECTION, EMULSION INTRAVENOUS at 11:20

## 2024-02-02 RX ADMIN — SODIUM CHLORIDE, POTASSIUM CHLORIDE, SODIUM LACTATE AND CALCIUM CHLORIDE: 600; 310; 30; 20 INJECTION, SOLUTION INTRAVENOUS at 11:08

## 2024-02-02 RX ADMIN — HEPARIN SODIUM 4000 UNITS: 1000 INJECTION, SOLUTION INTRAVENOUS; SUBCUTANEOUS at 12:14

## 2024-02-02 RX ADMIN — HEPARIN SODIUM 2000 UNITS: 1000 INJECTION, SOLUTION INTRAVENOUS; SUBCUTANEOUS at 12:52

## 2024-02-02 RX ADMIN — FENTANYL CITRATE 50 MCG: 50 INJECTION, SOLUTION INTRAMUSCULAR; INTRAVENOUS at 15:10

## 2024-02-02 RX ADMIN — FENTANYL CITRATE 50 MCG: 50 INJECTION, SOLUTION INTRAMUSCULAR; INTRAVENOUS at 14:40

## 2024-02-02 RX ADMIN — FENTANYL CITRATE 100 MCG: 50 INJECTION, SOLUTION INTRAMUSCULAR; INTRAVENOUS at 11:17

## 2024-02-02 RX ADMIN — Medication 200 MCG: at 11:28

## 2024-02-02 RX ADMIN — ONDANSETRON 4 MG: 2 INJECTION INTRAMUSCULAR; INTRAVENOUS at 13:49

## 2024-02-02 RX ADMIN — FENTANYL CITRATE 25 MCG: 50 INJECTION, SOLUTION INTRAMUSCULAR; INTRAVENOUS at 13:50

## 2024-02-02 RX ADMIN — FENTANYL CITRATE 50 MCG: 50 INJECTION, SOLUTION INTRAMUSCULAR; INTRAVENOUS at 13:19

## 2024-02-02 RX ADMIN — EPHEDRINE SULFATE 10 MG: 50 INJECTION, SOLUTION INTRAVENOUS at 12:34

## 2024-02-02 RX ADMIN — ROCURONIUM BROMIDE 100 MG: 50 INJECTION, SOLUTION INTRAVENOUS at 11:20

## 2024-02-02 RX ADMIN — DEXAMETHASONE SODIUM PHOSPHATE 8 MG: 4 INJECTION INTRA-ARTICULAR; INTRALESIONAL; INTRAMUSCULAR; INTRAVENOUS; SOFT TISSUE at 11:48

## 2024-02-02 RX ADMIN — LIDOCAINE HYDROCHLORIDE 4 ML: 40 SOLUTION TOPICAL at 11:20

## 2024-02-02 RX ADMIN — OXYCODONE HYDROCHLORIDE 10 MG: 5 SOLUTION ORAL at 14:40

## 2024-02-02 RX ADMIN — OXYCODONE HYDROCHLORIDE 10 MG: 10 TABLET ORAL at 16:24

## 2024-02-02 ASSESSMENT — PAIN DESCRIPTION - PAIN TYPE
TYPE: SURGICAL PAIN
TYPE: ACUTE PAIN
TYPE: SURGICAL PAIN
TYPE: ACUTE PAIN
TYPE: SURGICAL PAIN
TYPE: ACUTE PAIN;SURGICAL PAIN
TYPE: SURGICAL PAIN
TYPE: ACUTE PAIN
TYPE: ACUTE PAIN;SURGICAL PAIN
TYPE: SURGICAL PAIN

## 2024-02-02 ASSESSMENT — PAIN SCALES - GENERAL: PAIN_LEVEL: 2

## 2024-02-02 NOTE — OR SURGEON
Immediate Post OP Note    PreOp Diagnosis: PAOD, thrombosed right femoral-PT bypass      PostOp Diagnosis: PAOD, thrombosed right femoral-PT bypass      Procedure(s):  ANGIOGRAM - Wound Class: Clean  THROMBECTOMY - lower extremity - Wound Class: Clean  CREATION, BYPASS, ARTERIAL, FEMORAL TIBIAL, USING GRAFT - Wound Class: Clean    Surgeon(s):  Brian Cameron M.D.    Anesthesiologist/Type of Anesthesia:  Anesthesiologist: Ben Mansfield M.D.; Merrill Hollingsworth M.D./General    Surgical Staff:  Circulator: Anamaria Mcdaniel R.N.; Torres Humphrey R.N.  Relief Scrub: Leonardo Dyson  Scrub Person: Gucci Hager  Radiology Technologist: Norma Li; Janis Flanagan    Specimens removed if any:  * No specimens in log *    Estimated Blood Loss: 250cc    Findings: Complete thrombosis of the vein bypass. Replaced with 6mm PTFE conduit    Complications: none        2/2/2024 2:06 PM Brian Cameron M.D.

## 2024-02-02 NOTE — ANESTHESIA POSTPROCEDURE EVALUATION
Patient: Antonio Cantu    Procedure Summary       Date: 02/02/24 Room / Location: [place] [address] / SURGERY [facility]    Anesthesia Start: 1108 Anesthesia Stop: 1419    Procedures:       ANGIOGRAM (Right: Leg Lower)      THROMBECTOMY - lower extremity (Right: Leg Lower)      CREATION, BYPASS, ARTERIAL, FEMORAL TIBIAL, USING GRAFT (Right: Leg Upper) Diagnosis: (PERIPHERAL ARTERIAL DISEASE)    Surgeons: Brian Cameron M.D. Responsible Provider: Ben Mansfield M.D.    Anesthesia Type: general ASA Status: 3 - Emergent            Final Anesthesia Type: general  Last vitals  BP   Blood Pressure: 119/80    Temp   36.9 °C (98.5 °F)    Pulse   69   Resp   17    SpO2   94 %      Anesthesia Post Evaluation    Patient location during evaluation: PACU  Patient participation: complete - patient participated  Level of consciousness: awake  Pain score: 2    Airway patency: patent  Anesthetic complications: no  Cardiovascular status: hemodynamically stable  Respiratory status: acceptable  Hydration status: acceptable    PONV: none          No notable events documented.     Nurse Pain Score: 0 (NPRS)

## 2024-02-02 NOTE — CARE PLAN
The patient is Stable - Low risk of patient condition declining or worsening    Shift Goals  Clinical Goals: CMS checks, mobilize  Patient Goals: Rest    Progress made toward(s) clinical / shift goals: Pt did have (+) pedal pulse on R overnight and this AM but unable to be found this afternoon, pt now on heparin GTT with plan for surgery tomorrow.       Problem: Pain - Standard  Goal: Alleviation of pain or a reduction in pain to the patient’s comfort goal  Outcome: Progressing     Problem: Fall Risk  Goal: Patient will remain free from falls  Outcome: Progressing     Problem: Knowledge Deficit - Standard  Goal: Patient and family/care givers will demonstrate understanding of plan of care, disease process/condition, diagnostic tests and medications  Outcome: Progressing       Patient is not progressing towards the following goals:

## 2024-02-02 NOTE — CARE PLAN
Problem: Pain - Standard  Goal: Alleviation of pain or a reduction in pain to the patient’s comfort goal  Outcome: Progressing  Note: Prn meds per MAR     Problem: Knowledge Deficit - Standard  Goal: Patient and family/care givers will demonstrate understanding of plan of care, disease process/condition, diagnostic tests and medications  Outcome: Progressing  Note: NPO for planned thrombectomy, heparin drip    The patient is Watcher - Medium risk of patient condition declining or worsening    Shift Goals  Clinical Goals: heparin gtt, CMS checks  Patient Goals: rest  Family Goals: N/A    Progress made toward(s) clinical / shift goals:  pre op plans    Patient is not progressing towards the following goals:

## 2024-02-02 NOTE — CARE PLAN
The patient is Stable - Low risk of patient condition declining or worsening    Shift Goals  Clinical Goals: heparin gtt, CMS checks  Patient Goals: rest  Family Goals: N/A    Progress made toward(s) clinical / shift goals:  hep gtt not therapeutic.possible sx in AM. Pt denies pain. Feet cold to touch. Faint pedal pulse on doppler    Patient is not progressing towards the following goals:    Problem: Pain - Standard  Goal: Alleviation of pain or a reduction in pain to the patient’s comfort goal  Outcome: Progressing     Problem: Fall Risk  Goal: Patient will remain free from falls  Outcome: Progressing

## 2024-02-02 NOTE — ANESTHESIA PROCEDURE NOTES
Airway    Date/Time: 2/2/2024 11:21 AM    Performed by: Merrill Hollingsworth M.D.  Authorized by: Merrill Hollingsworth M.D.    Location:  OR  Urgency:  Elective  Difficult Airway: No    Indications for Airway Management:  Anesthesia      Spontaneous Ventilation: absent    Sedation Level:  Deep  Preoxygenated: Yes    Patient Position:  Sniffing  Mask Difficulty Assessment:  2 - vent by mask + OA or adjuvant +/- NMBA  Final Airway Type:  Endotracheal airway  Final Endotracheal Airway:  ETT  Cuffed: Yes    Technique Used for Successful ETT Placement:  Direct laryngoscopy    Insertion Site:  Oral  Blade Type:  Demetri  Laryngoscope Blade/Videolaryngoscope Blade Size:  3  ETT Size (mm):  8.0  Measured from:  Teeth  ETT to Teeth (cm):  24  Placement Verified by: auscultation and capnometry    Cormack-Lehane Classification:  Grade I - full view of glottis  Number of Attempts at Approach:  1   Atraumatic DLx1

## 2024-02-02 NOTE — PROGRESS NOTES
Assumed care at midnight. A&ox 4  Pt refusing another iv access.  Declines any meds . Denies pain  NPO after MN. Possible sx today  +pulse on doppler on rt pedal. (Bottom x).   PT pulse not heard. Cold to touch.   Hep gtt infusing . Not therapeutic yet  Call light within reach. Hourly rounding in place

## 2024-02-02 NOTE — ANESTHESIA PREPROCEDURE EVALUATION
Case: [id number] Date/Time: 02/02/24 1003    Procedures:       ANGIOGRAM (Right)      THROMBECTOMY - lower extremity (Right)      CREATION, BYPASS, ARTERIAL, FEMORAL TO POPLITEAL, USING GRAFT    Location: [place] OR 04 / SURGERY [facility]    Surgeons: Brian Cameron M.D.            Relevant Problems   ANESTHESIA   (positive) Sleep apnea      PULMONARY   (positive) Chronic bronchitis (HCC)      CARDIAC   (positive) PAD (peripheral artery disease) (HCC)   (positive) PAOD (peripheral arterial occlusive disease) (HCC)      Other   (positive) Alcohol use disorder   (positive) Tobacco use disorder       Physical Exam    Airway   Mallampati: II  TM distance: >3 FB  Neck ROM: full       Cardiovascular - normal exam  Rhythm: regular  Rate: normal  (-) murmur     Dental - normal exam           Pulmonary - normal exam  Breath sounds clear to auscultation     Abdominal    Neurological - normal exam                   Anesthesia Plan    ASA 3- EMERGENT   ASA physical status 3 criteria: COPD - poorly controlledASA physical status emergent criteria: acute ischemia (limb, body part, tissue)    Plan - general       Airway plan will be ETT          Induction: intravenous    Postoperative Plan: Postoperative administration of opioids is intended.    Pertinent diagnostic labs and testing reviewed    Informed Consent:    Anesthetic plan and risks discussed with patient.    Use of blood products discussed with: patient whom consented to blood products.

## 2024-02-02 NOTE — OP REPORT
VASCULAR SURGERY SERVICE                       Operative Note  _____________________________________________________    Date: 2024    Patient: Antonio Cantu  : 1960  MRN: [id number]  _____________________________________________________      Preoperative Diagnosis:  -Peripheral arterial occlusive disease with right lower extremity ischemia and acute thrombosis of right femoral to posterior tibial bypass    Postoperative Diagnosis:  -Peripheral arterial occlusive disease with right lower extremity ischemia and acute thrombosis of right femoral to posterior tibial bypass    Procedure:  -Harinder catheter thrombectomy of right posterior tibial artery (28741)  -Right femoral to posterior tibial artery bypass with 6 mm PTFE conduit (88100)  -Right lower extremity angiogram via direct needle insertion into the bypass graft (05386)    _____________________________________________________    Surgeon:                                 Brian Cameron MD    Assistant:   none    Anesthesia:                             General anesthesia plus local anesthetic    EBL:                                        250cc    Heparin:                                  Systemically heparinized    Findings:   Complete thrombosis of the existing bypass graft and subsequent ischemia of the vein conduit.  Therefore the bypass was replaced with prosthetic conduit.    Complications:                        none    Disposition:                             Tolerated well, sent to recovery in stable condition  _____________________________________________________      History:  63 y.o. male with longstanding history of peripheral arterial disease.  Patient underwent a left femoral to distal posterior tibial artery bypass with ipsilateral reversed greater saphenous vein on 2024.  On postoperative day 1 it was clear the bypass was not functioning and likely thrombosed.  Patient was therefore taken for exploration of the right leg  and potential thrombectomy/revision of the bypass versus replacement.    I conducted a thorough preoperative discussion regarding our findings and recommendations.  I explained the operation, alternatives, and potential risks, including but not limited to bleeding, infection, injury to vessels or nerves, possible multiple incisions, use of xray and contrast exposure, risks of anesthesia, and global risks such as stroke, heart attack, pulmonary complications, and even potentially not surviving the operation or the recovery.  All questions were answered. They understand and agree to proceed.    Procedure Summary:  Following informed consent, the patient was placed supine on the operating table, and general anesthesia was administered.  The patient was prepped and draped sterile in the usual fashion. Surgical time-out was called, and everyone was in agreement.  The patient did receive preoperative prophylactic antibiotics.      Procedure began by reopening the incision overlying the distal anastomosis.  The existing vein graft was found to be thrombosed and had an ischemic appearance to it.  I detached the distal anastomosis off of the posterior tibial artery and there was thrombus preventing antegrade or retrograde bleeding from the arteriotomy.  The posterior tibial artery was swept proximally and distally and I restored antegrade and retrograde bleeding at the site of the arteriotomy.  It did not appear to me that the existing vein graft was salvageable and so plan at this point was to redo the bypass with PTFE conduit.  I reopened the superior aspect of the incision in the groin and identified the proximal anastomosis.  At this point I used a long Lynsey-Wick tunneler to pass a 6 mm Distaflo bypass between the 2 incisions.  A counterincision near the knee was required due to the length of the bypass.  After tunneling the Distaflo it was found to be slightly too short to reach between the proximal and distal  anastomotic sites and had to be extended with an additional segment of 6 mm PTFE conduit.  The patient was systemically heparinized and then the common femoral artery was clamped proximally and distally and the existing anastomosis was removed and a new anastomosis was created with the PTFE conduit.  The conduit was pressurized and the anastomosis was found to be adequately hemostatic and topical hemostatic agent was applied.  Next I turned my attention to the distal anastomosis.  The angela of the Distaflo conduit was trimmed to the appropriate size and then anastomosed to the posterior tibial artery in an end-to-side fashion using a running 6-0 Powell-Filiberto suture.  The anastomosis was flushed and irrigated prior to completion once complete topical hemostatic was applied.  Lastly I flushed both grafts and created a graft to graft anastomosis in the proximal thigh using a running 6-0 Powell-Filiberto suture.  The anastomosis was flushed and irrigated prior to completion and once complete it was pressurized, the entire graft was pressurized, and all 3 anastomoses were found to be hemostatic.  Outflow was assessed with Doppler and found to be brisk.  I directly inserted a microneedle into the bypass graft and performed a runoff angiogram which showed evidence of stenosis at the site of the distal clamp on the posterior tibial artery however there was good flow out into the foot.  I made a small opening in the graft near the distal anastomosis and ran a 3 mm vessel dilator easily past the anastomosis and then the graftotomy was closed with Powell-Filiberto suture.  Flow was once again restored and there was still a brisk Doppler signal distal to the anastomosis.  All of the incisions were irrigated and suctioned clean.  Topical hemostatic was applied.  The incisions were closed in multiple layers of absorbable suture and then staples for the groin incision and a counterincision near the knee, and interrupted vertical mattress 2-0 nylon  sutures for the distal incision overlying the distal anastomosis.  A new Prevena bandage was applied to cover all of the incisions.    The patient was then sent to recovery in stable condition.  All counts were correct at the conclusion of the case.    Postoperative Plan:    Patient will be maintained on heparin infusion postoperatively to preserve bypass patency.  He will be transitioned to oral anticoagulation on postoperative day 1 or 2 and we will plan to keep him on oral anticoagulation indefinitely while the bypass is patent.  Patient will be in close surveillance through the outpatient vascular clinic to maximize bypass patency.  Also smoking cessation was discussed and encouraged.    Brian Cameron MD  [facility]

## 2024-02-02 NOTE — OR NURSING
1418- Pt arrives to PACU from OR on 6L of oxygen vi a mask. Report received. Wound vac in place. Pulses palpable to R foot 1+. Cap refill > 3 seconds. Pt denies pain and nausea at this time.     1434- Heparin xa drawn and sent. Pt medicated for 7/10 pain per MAR.     1524- Pt taken to room, pt comfortable and alert upon leaving PACU.

## 2024-02-02 NOTE — RESPIRATORY CARE
" SMOKING CESSATION EDUCATION by COPD CLINICAL EDUCATOR  2/1/2024 at 4:26 PM by Monse Antonio, RRT     Patient denies history/diagnosis of COPD. Smoking Cessation Intervention and education completed, 5 minutes spent on smoking cessation education with patient.  Provided smoking cessation packet with \"Tips to Quit\" and brochure for \"Free Smoking Cessation Classes\".   "

## 2024-02-02 NOTE — PROGRESS NOTES
Spoke with [facility] pharmacist regarding heparin drip rate, adjusted and verified with a 2nd RN per protocol, next xa level at 2151.

## 2024-02-02 NOTE — ANESTHESIA PROCEDURE NOTES
Arterial Line    Performed by: Merrill Hollingsworth M.D.  Authorized by: Merrill Hollingsworth M.D.    Start Time:  2/2/2024 12:00 PM  End Time:  2/2/2024 12:01 PM  Localization: ultrasound guidance and surface landmarks    Patient Location:  OR  Indication: continuous blood pressure monitoring        Catheter Size:  20 G  Seldinger Technique?: Yes    Laterality:  Left  Site:  Radial artery  Line Secured:  Antimicrobial disc, tape and transparent dressing  Events: patient tolerated procedure well with no complications     Left radial vikash placed in OR.  Alcohol prep, arrow catheter.   U/s used for image guidance.  Catheter secured with tegaderm and tape.

## 2024-02-02 NOTE — PROGRESS NOTES
Received back from PACU via bed, aaox4, pain controlled at this time, RLE dressing CDI to prevena, good pulse via palpation on affected extremity, POC discussed, needs attended.

## 2024-02-02 NOTE — ANESTHESIA TIME REPORT
Anesthesia Start and Stop Event Times       Date Time Event    2/2/2024 1051 Ready for Procedure     1108 Anesthesia Start     1419 Anesthesia Stop          Responsible Staff  02/02/24      Name Role Begin End    Merrill Hollingsworth M.D. Anesth 1108 1306    Ben Mansfield M.D. Anesth 1306 1419          Overtime Reason:  no overtime (within assigned shift)    Comments:

## 2024-02-02 NOTE — PROGRESS NOTES
VASCULAR SURGERY SERVICE                        Progress Note  _________________________________    OR today for RLE thrombectomy, angiogram, possible new bypass insertion    Tentatively around 1000    Brian Cameron MD  [facility]   [employer] preferred or call my office [phone]  __________________________________________________  Patient:Antonio Cantu   MRN:[id number]

## 2024-02-03 LAB
ACT BLD: 217 SEC (ref 74–137)
UFH PPP CHRO-ACNC: 0.33 IU/ML
UFH PPP CHRO-ACNC: 0.48 IU/ML

## 2024-02-03 PROCEDURE — 770001 HCHG ROOM/CARE - MED/SURG/GYN PRIV*

## 2024-02-03 PROCEDURE — 36415 COLL VENOUS BLD VENIPUNCTURE: CPT

## 2024-02-03 PROCEDURE — A9270 NON-COVERED ITEM OR SERVICE: HCPCS | Performed by: SURGERY

## 2024-02-03 PROCEDURE — 85520 HEPARIN ASSAY: CPT | Mod: 91

## 2024-02-03 PROCEDURE — 700102 HCHG RX REV CODE 250 W/ 637 OVERRIDE(OP): Performed by: SURGERY

## 2024-02-03 PROCEDURE — 97602 WOUND(S) CARE NON-SELECTIVE: CPT

## 2024-02-03 PROCEDURE — 700111 HCHG RX REV CODE 636 W/ 250 OVERRIDE (IP): Performed by: SURGERY

## 2024-02-03 RX ADMIN — APIXABAN 5 MG: 5 TABLET, FILM COATED ORAL at 17:12

## 2024-02-03 RX ADMIN — APIXABAN 5 MG: 5 TABLET, FILM COATED ORAL at 11:29

## 2024-02-03 RX ADMIN — ACETAMINOPHEN 1000 MG: 500 TABLET ORAL at 17:13

## 2024-02-03 RX ADMIN — ACETAMINOPHEN 1000 MG: 500 TABLET ORAL at 11:29

## 2024-02-03 RX ADMIN — ATORVASTATIN CALCIUM 40 MG: 40 TABLET, FILM COATED ORAL at 17:12

## 2024-02-03 RX ADMIN — HEPARIN SODIUM 20 UNITS/KG/HR: 5000 INJECTION, SOLUTION INTRAVENOUS at 10:33

## 2024-02-03 RX ADMIN — DOCUSATE SODIUM 100 MG: 100 CAPSULE, LIQUID FILLED ORAL at 17:17

## 2024-02-03 ASSESSMENT — PAIN DESCRIPTION - PAIN TYPE
TYPE: ACUTE PAIN

## 2024-02-03 ASSESSMENT — COGNITIVE AND FUNCTIONAL STATUS - GENERAL
SUGGESTED CMS G CODE MODIFIER DAILY ACTIVITY: CI
CLIMB 3 TO 5 STEPS WITH RAILING: A LITTLE
WALKING IN HOSPITAL ROOM: A LITTLE
MOBILITY SCORE: 21
STANDING UP FROM CHAIR USING ARMS: A LITTLE
DRESSING REGULAR LOWER BODY CLOTHING: A LITTLE
SUGGESTED CMS G CODE MODIFIER MOBILITY: CJ
DAILY ACTIVITIY SCORE: 23

## 2024-02-03 NOTE — PROGRESS NOTES
"       VASCULAR SURGERY SERVICE                        Progress Note  _________________________________    No concerns overnight  Pain moderate  Hurts to bear weight on the leg, within expectation after the surgery    /66   Pulse 63   Temp 36.6 °C (97.9 °F) (Temporal)   Resp 16   Ht 1.93 m (6' 4\")   Wt 87.1 kg (192 lb 0.3 oz)   SpO2 99%   BMI 23.37 kg/m²     Bypass patent, brisk PT signal  Mild edema, no hematoma  Prevena CDI      - increase activity, encouraged to bear weight on the RLE as able  - switch to eliquis 5BID today  - Prevena will remain until on/around  - Anticipate home in 1-2 days depending on functional status    Brian Cameron MD  [facility]   [employer] preferred or call my office [phone]  __________________________________________________  Patient:Antonioroya Sneed Cantu   MRN:[id number]       "

## 2024-02-03 NOTE — PROGRESS NOTES
Bedside report received from day shift nurse. Assumed care at 1845.   Pt A&Ox4  Tolerating regular diet, denies n/v. + bowel sounds, + flatus, LBM PTA.  Saturating >90% on 1L NC. Denies SOB  Pt ambulates x1-2 assist with crutches.   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 No signs or symptoms of bleeding. Heparin gtt running, verified.    Pain is controlled through medication orders. Updated on plan of care. Safety education provided. Bed locked in low. Call light within reach. Rounding in place.

## 2024-02-03 NOTE — WOUND TEAM
[facility]  Inpatient Services  Wound and Skin Care Brief Evaluation    Admission Date: 1/31/2024     Last order of IP CONSULT TO WOUND CARE was found on 2/1/2024 from Hospital Encounter on 1/17/2024     HPI, PMH, SH: Reviewed    No chief complaint on file.    Diagnosis: Peripheral arterial disease (HCC) [I73.9]  PAOD (peripheral arterial occlusive disease) ([facility]) [I77.9]    Unit where seen by Wound Team: T405/00     Wound consult placed regarding L BKA stump. Chart and images reviewed. This discussed with bedside RN. This clinician in to assess patient. Patient pleasant and agreeable. L BKA stump with intact skin. There is a small scab to area which should resolve with minimal intervention. R foot toes appear perfused following vascular procedure with Dr. Cameron. Prevena dressing intact.    No pressure injuries or advanced wound care needs identified. Wound consult completed. No further follow up unless indicated and consulted.          PREVENTATIVE INTERVENTIONS:    Q shift Cody - performed per nursing policy  Q shift pressure point assessments - performed per nursing policy

## 2024-02-03 NOTE — PROGRESS NOTES
Report received from Leigha ROSA and care of patient assumed at 0700. Assessment complete. Patient refused pain medication for pain level 5/10. Patient is aware of interventions available for his comfort. Vital signs are stable. Call light is within patient reach.

## 2024-02-04 LAB
ALBUMIN SERPL BCP-MCNC: 3.2 G/DL (ref 3.2–4.9)
ALBUMIN/GLOB SERPL: 1 G/DL
ALP SERPL-CCNC: 75 U/L (ref 30–99)
ALT SERPL-CCNC: 13 U/L (ref 2–50)
ANION GAP SERPL CALC-SCNC: 8 MMOL/L (ref 7–16)
AST SERPL-CCNC: 12 U/L (ref 12–45)
BILIRUB SERPL-MCNC: 0.4 MG/DL (ref 0.1–1.5)
BUN SERPL-MCNC: 11 MG/DL (ref 8–22)
CALCIUM ALBUM COR SERPL-MCNC: 10.3 MG/DL (ref 8.5–10.5)
CALCIUM SERPL-MCNC: 9.7 MG/DL (ref 8.5–10.5)
CHLORIDE SERPL-SCNC: 100 MMOL/L (ref 96–112)
CO2 SERPL-SCNC: 26 MMOL/L (ref 20–33)
CREAT SERPL-MCNC: 0.67 MG/DL (ref 0.5–1.4)
ERYTHROCYTE [DISTWIDTH] IN BLOOD BY AUTOMATED COUNT: 43.5 FL (ref 35.9–50)
GFR SERPLBLD CREATININE-BSD FMLA CKD-EPI: 104 ML/MIN/1.73 M 2
GLOBULIN SER CALC-MCNC: 3.1 G/DL (ref 1.9–3.5)
GLUCOSE SERPL-MCNC: 123 MG/DL (ref 65–99)
HCT VFR BLD AUTO: 39.5 % (ref 42–52)
HGB BLD-MCNC: 13.4 G/DL (ref 14–18)
MCH RBC QN AUTO: 31.1 PG (ref 27–33)
MCHC RBC AUTO-ENTMCNC: 33.9 G/DL (ref 32.3–36.5)
MCV RBC AUTO: 91.6 FL (ref 81.4–97.8)
PLATELET # BLD AUTO: 232 K/UL (ref 164–446)
PMV BLD AUTO: 9.4 FL (ref 9–12.9)
POTASSIUM SERPL-SCNC: 4 MMOL/L (ref 3.6–5.5)
PROT SERPL-MCNC: 6.3 G/DL (ref 6–8.2)
RBC # BLD AUTO: 4.31 M/UL (ref 4.7–6.1)
SODIUM SERPL-SCNC: 134 MMOL/L (ref 135–145)
WBC # BLD AUTO: 10.7 K/UL (ref 4.8–10.8)

## 2024-02-04 PROCEDURE — 770001 HCHG ROOM/CARE - MED/SURG/GYN PRIV*

## 2024-02-04 PROCEDURE — 85027 COMPLETE CBC AUTOMATED: CPT

## 2024-02-04 PROCEDURE — 700102 HCHG RX REV CODE 250 W/ 637 OVERRIDE(OP): Performed by: SURGERY

## 2024-02-04 PROCEDURE — A9270 NON-COVERED ITEM OR SERVICE: HCPCS | Performed by: SURGERY

## 2024-02-04 PROCEDURE — 80053 COMPREHEN METABOLIC PANEL: CPT

## 2024-02-04 RX ADMIN — ATORVASTATIN CALCIUM 40 MG: 40 TABLET, FILM COATED ORAL at 16:32

## 2024-02-04 RX ADMIN — ACETAMINOPHEN 1000 MG: 500 TABLET ORAL at 04:05

## 2024-02-04 RX ADMIN — APIXABAN 5 MG: 5 TABLET, FILM COATED ORAL at 16:32

## 2024-02-04 RX ADMIN — APIXABAN 5 MG: 5 TABLET, FILM COATED ORAL at 04:05

## 2024-02-04 ASSESSMENT — PAIN DESCRIPTION - PAIN TYPE
TYPE: ACUTE PAIN

## 2024-02-04 NOTE — CARE PLAN
The patient is Stable - Low risk of patient condition declining or worsening    Shift Goals  Clinical Goals: pain management, check pedal pulses  Patient Goals: rest  Family Goals: n/a    Progress made toward(s) clinical / shift goals:  Pt medicated per MAR, resting. Pedal pulses strong, heard by doppler. Updated pt on POC. Care ongoing.    Patient is not progressing towards the following goals:

## 2024-02-04 NOTE — CARE PLAN
The patient is Stable - Low risk of patient condition declining or worsening    Shift Goals  Clinical Goals: pain management, patient comfort  Patient Goals: rest  Family Goals: not present at this time    Progress made toward(s) clinical / shift goals:  Vital signs have remained stable. Pulses assessed via Doppler and palpation. Patient reports pain to be well-controlled. Patient is tolerating room air.    Patient is not progressing towards the following goals: Pending DC clearance.    Problem: Pain - Standard  Goal: Alleviation of pain or a reduction in pain to the patient’s comfort goal  Outcome: Progressing     Problem: Fall Risk  Goal: Patient will remain free from falls  Outcome: Progressing     Problem: Knowledge Deficit - Standard  Goal: Patient and family/care givers will demonstrate understanding of plan of care, disease process/condition, diagnostic tests and medications  Outcome: Progressing

## 2024-02-04 NOTE — PROGRESS NOTES
Report received from Leigha ROSA and care of patient assumed at 0700. Assessment complete. Right pedal pulse confirmed via Doppler. Patient denied acute pain issues at time of assessment and is aware of measures available for his comfort. Call light is within patient reach.

## 2024-02-04 NOTE — PROGRESS NOTES
Bedside report received from day shift nurse. Assumed care at 1845.   Pt A&Ox4  Tolerating regular diet, denies n/v. + bowel sounds, + flatus, LBM PTA  Saturating >90% on RA. Denies SOB  Pt ambulates x1 with crutches.   Prevena to R leg, L BKA    Pain is controlled through medication orders. Updated on plan of care. Safety education provided. Bed locked in low. Call light within reach. Rounding in place.

## 2024-02-04 NOTE — CARE PLAN
The patient is Stable - Low risk of patient condition declining or worsening    Shift Goals  Clinical Goals: pain management, pulse check, prevena W[place] management, heparin Xa monitoring  Patient Goals: rest  Family Goals: n/a    Progress made toward(s) clinical / shift goals:  Patient's continuous heparin infusion has been completed; patient has been switched to oral apixaban. Pain medicated with scheduled interventions; patient has otherwise denied acute issues. Prevena wound vac remains in place and is functioning.     Patient is not progressing towards the following goals: Pending DC clearance.    Problem: Pain - Standard  Goal: Alleviation of pain or a reduction in pain to the patient’s comfort goal  Outcome: Progressing     Problem: Fall Risk  Goal: Patient will remain free from falls  Outcome: Progressing     Problem: Knowledge Deficit - Standard  Goal: Patient and family/care givers will demonstrate understanding of plan of care, disease process/condition, diagnostic tests and medications  Outcome: Progressing

## 2024-02-05 VITALS
WEIGHT: 192.02 LBS | SYSTOLIC BLOOD PRESSURE: 122 MMHG | HEIGHT: 76 IN | OXYGEN SATURATION: 95 % | TEMPERATURE: 98.1 F | BODY MASS INDEX: 23.38 KG/M2 | HEART RATE: 78 BPM | RESPIRATION RATE: 16 BRPM | DIASTOLIC BLOOD PRESSURE: 72 MMHG

## 2024-02-05 PROCEDURE — A9270 NON-COVERED ITEM OR SERVICE: HCPCS | Performed by: SURGERY

## 2024-02-05 PROCEDURE — 700102 HCHG RX REV CODE 250 W/ 637 OVERRIDE(OP): Performed by: SURGERY

## 2024-02-05 RX ORDER — OXYCODONE HYDROCHLORIDE AND ACETAMINOPHEN 5; 325 MG/1; MG/1
1-2 TABLET ORAL EVERY 6 HOURS PRN
Qty: 30 TABLET | Refills: 0 | Status: SHIPPED | OUTPATIENT
Start: 2024-02-05 | End: 2024-02-10

## 2024-02-05 RX ADMIN — APIXABAN 5 MG: 5 TABLET, FILM COATED ORAL at 05:56

## 2024-02-05 ASSESSMENT — PAIN DESCRIPTION - PAIN TYPE
TYPE: ACUTE PAIN;SURGICAL PAIN
TYPE: ACUTE PAIN

## 2024-02-05 NOTE — PROGRESS NOTES
"       VASCULAR SURGERY SERVICE                        Progress Note  _________________________________    No concerns overnight  Pain moderate  Able to bear more weight on the right leg    /72   Pulse 78   Temp 36.7 °C (98.1 °F) (Temporal)   Resp 16   Ht 1.93 m (6' 4\")   Wt 87.1 kg (192 lb 0.3 oz)   SpO2 95%   BMI 23.37 kg/m²     Bypass patent, brisk PT signal  Mild edema, no hematoma  Prevena CDI      - overall doing well  - eliquis 5BID indefinitely  - Prevena will remain until on/around 2/7-2/9, patient instructed to remove and discard at home  - Home today  - Follow-up 2 weeks for staple removal    Brian Cameron MD  [facility]   [employer] preferred or call my office [phone]  __________________________________________________  Patient:Antonio Cantu   MRN:[id number]       "

## 2024-02-05 NOTE — PROGRESS NOTES
Bedside report received from aliyah RN, assumed care at 1900.  Assessment complete.  A&O x 4.  Patient ambulates with standby assist with prosthetic. Bed alarm refused, educated patient, patient understanding but still refusing, patient calls appropriately.   Patient denies pain at this time  Denies N&V. Tolerating regular diet.  Prevena WV to RLE. L BKA.  + void, + flatus, + BM.  Patient denies SOB. On room air.  Patient calm, pleasant, and cooperative.  Review plan with of care with patient. Call light and personal belongings within reach. Hourly rounding in place. All needs met at this time.

## 2024-02-05 NOTE — DISCHARGE INSTRUCTIONS
______________________________________________________________________  ______________________________________________________________________    [facility]  Discharge Instructions     Procedure: Upper/Lower Extremity Open Revascularization (endarterectomy, bypass, etc)     - ACTIVITIES: No strenuous activities or heavy lifting (greater than 15 pounds) for 3 weeks. Regular activities of daily living are fine including walking and stairs.     - DRIVING: You may drive whenever you are off pain medications and are able to perform the activities needed to drive, i.e. turning, bending, twisting, etc.      - WOUND/BATHING: It is not unusual for patients to experience swelling and even bruising, as well as a small amount of drainage from the incisions. This is normal and will resolve over the next 1-2 weeks. You can shower starting the day of discharge. You may remove any white bandages 2 days after discharge and after that you can shower and get the incisions wet and only need to cover the incisions if there is drainage from the incisions.    - If you have a Prevena bandage covering any of your incisions (purple suction bandage):  The pump will run for 7 days automatically.  You can shower and get the purple bandage wet but don't get the pump wet (either put it in a bag to protect it or set it outside of the shower).  Once the battery dies you can remove the bandage and throw it away and cover the incision with white gauze if it is oozing at all. If you have any trouble with the pump please call the surgeon's office at [phone].    - PAIN MEDICATION: You may be given a prescription for pain medication at discharge. Please take these as directed and only if needed.  It is important to remember not to take medications on an empty stomach as this may cause nausea. Also, you may get a prescription for a stool softener which you should take as long as you are taking pain medication. It is also OK to take  over-the-counter medication for pain like Tylenol and Ibuprofen.     - BOWEL FUNCTION: After surgery, it is not uncommon for patients to experience constipation. This is due to decreasing activity levels as well as pain medications. You may need to use a laxative (Milk of Magnesia, Ex-lax; Senokot, etc.) if you go more than 1-2 days without a bowel movement.     - CALL IF YOU HAVE: (1) Fevers to more than 101.5 F, (2) Unusual or excessive pain, (3) Drainage or fluid from incision that may be foul smelling, increased tenderness or soreness at the wound or the wound edges are no longer together, redness or swelling at the incision site. If you have any additional questions or concerns, please contact the office at [phone].     - Please call our office to schedule a follow up visit with your surgeon 2-3 weeks after discharge. It is also recommended that you schedule a visit with your primary care doctor 2-4 weeks after discharge so they can evaluate you after the surgery as well and review your medications.     Office address:   [facility]  [facility] [address] [place] [address] [address]  [place] [address]  [phone]  Fax [phone]    ______________________________________________________________________  ______________________________________________________________________

## 2024-02-05 NOTE — CARE PLAN
The patient is Stable - Low risk of patient condition declining or worsening    Shift Goals  Clinical Goals: pulse checks  Patient Goals: Rest  Family Goals: not present at this time    Progress made toward(s) clinical / shift goals:  Pulse checks completed, pulses found with doppler    Patient is not progressing towards the following goals:

## 2024-02-05 NOTE — DISCHARGE SUMMARY
VASCULAR SURGERY SERVICE  _________________________________    DISCHARGE SUMMARY    Antonio Cantu  [id number]  [id number]  _____________________________________    DATE OF ADMISSION: 1/31/2024    DATE OF DISCHARGE: 2/5/2024    ADMISSION DIAGNOSIS (ES):  Peripheral arterial disease (HCC) [I73.9]    DISCHARGE DIAGNOSIS (ES):  Peripheral arterial disease (HCC) [I73.9]    DISCHARGE CONDITION:  stable    CONSULTATIONS:  none    PROCEDURES:  1/31/2024  Right femoral-PT bypass with irGSV  2/2/24   Redo right femoral-PT bypass with 6mm PTFE    HOSPITAL COURSE:  Antonio Cantu is a 63 y.o. male with known extensive history of PAOD.  He already has a long-standing left BKA and multiple revascularization procedures in the right leg.  On 1/31 patient underwent the above-mentioned procedure without complication and was admitted to [facility] postoperatively. On POD 1 his bypass was thrombosed for unclear reasons.  On POD 2 I explored the bypass and found the conduit was ischemic from the thrombosis and I placed a femoral-PT bypass with 6mm PTFE conduit.  After that he had an uneventful recovery and was discharged intact on 2/5/2024.  Patient will be on eliquis indefinitely to help maintain bypass patency.    MEDICATIONS:  Current Outpatient Medications   Medication Sig Dispense Refill    apixaban (ELIQUIS) 5mg Tab Take 1 Tablet by mouth 2 times a day. 180 Tablet 2    oxyCODONE-acetaminophen (PERCOCET) 5-325 MG Tab Take 1-2 Tablets by mouth every 6 hours as needed for Moderate Pain or Severe Pain for up to 5 days. 30 Tablet 0       FOLLOW-UP:  Please call our office at [phone] to make a postoperative appointment in 2 week(s)    DISCHARGE INSTRUCTIONS:  Resume preadmission diet.  Light activity for 4-6 weeks.  OK to shower and get incisions wet.  Call or go to ER if you have chest pain, shortness of breath, lightheadedness, stroke-like symptoms, fever, worsening pain, or any other concerns.    Office address:   [facility]  [address]  [place] [address]  [phone]  Fax [phone]

## 2024-02-05 NOTE — PROGRESS NOTES
Assessment complete. Patient denied acute pain issues. Discharge orders have been received; patient is awaiting confirmation of ride home.

## 2024-02-05 NOTE — CARE PLAN
The patient is Stable - Low risk of patient condition declining or worsening    Shift Goals  Clinical Goals: discharge home  Patient Goals: discharge home  Family Goals: not currently present    Progress made toward(s) clinical / shift goals:  Patient has been cleared to discharge home. Orders are in place. Patient is dressed and has belongings packed. Patient's ride home is en route.    Patient is not progressing towards the following goals: Pending transfer to [facility].     Problem: Pain - Standard  Goal: Alleviation of pain or a reduction in pain to the patient’s comfort goal  Outcome: Met     Problem: Fall Risk  Goal: Patient will remain free from falls  Outcome: Met     Problem: Knowledge Deficit - Standard  Goal: Patient and family/care givers will demonstrate understanding of plan of care, disease process/condition, diagnostic tests and medications  Outcome: Met

## 2024-02-20 ENCOUNTER — APPOINTMENT (OUTPATIENT)
Dept: VASCULAR SURGERY | Facility: MEDICAL CENTER | Age: 64
End: 2024-02-20
Payer: OTHER MISCELLANEOUS

## 2024-03-12 ENCOUNTER — OFFICE VISIT (OUTPATIENT)
Dept: VASCULAR SURGERY | Facility: MEDICAL CENTER | Age: 64
End: 2024-03-12
Payer: OTHER MISCELLANEOUS

## 2024-03-12 VITALS
DIASTOLIC BLOOD PRESSURE: 62 MMHG | OXYGEN SATURATION: 95 % | HEART RATE: 100 BPM | BODY MASS INDEX: 23.26 KG/M2 | WEIGHT: 191 LBS | SYSTOLIC BLOOD PRESSURE: 100 MMHG | HEIGHT: 76 IN | TEMPERATURE: 99.2 F

## 2024-03-12 DIAGNOSIS — I77.9 PAOD (PERIPHERAL ARTERIAL OCCLUSIVE DISEASE) (HCC): ICD-10-CM

## 2024-03-12 PROCEDURE — 99024 POSTOP FOLLOW-UP VISIT: CPT | Performed by: SURGERY

## 2024-03-12 PROCEDURE — 3078F DIAST BP <80 MM HG: CPT | Performed by: SURGERY

## 2024-03-12 PROCEDURE — 3074F SYST BP LT 130 MM HG: CPT | Performed by: SURGERY

## 2024-03-12 RX ORDER — CLOPIDOGREL BISULFATE 75 MG/1
75 TABLET ORAL DAILY
Qty: 90 TABLET | Refills: 3 | Status: SHIPPED | OUTPATIENT
Start: 2024-03-12

## 2024-03-12 ASSESSMENT — FIBROSIS 4 INDEX: FIB4 SCORE: 0.92

## 2024-03-12 NOTE — PROGRESS NOTES
"                 VASCULAR SURGERY                 Clinic Progress Note  _________________________________    1/31/24 Right fem-PT bypass with irGSV (Rakesh)    2/2/24 Redo right fem-PT with 6mm PTFE (Rakesh)    3/12/24 First postoperative visit. Healing well. Foot feeling a little better, less pain but still some residual numbness, no wounds    Vitals  /62 (BP Location: Left arm, Patient Position: Sitting, BP Cuff Size: Adult)   Pulse 100   Temp 37.3 °C (99.2 °F) (Temporal)   Ht 1.93 m (6' 4\")   Wt 86.6 kg (191 lb)   SpO2 95%   BMI 23.25 kg/m²     Bypass patent, brisk PT signal, minor wound of the proximal and distal portions of the incision but improving.   No concerning toe wounds      A/P)  Doing well, bypass patent, clinically improving  Plavix 75 daily, Lipitor 40 daily    Still smoking 1/2ppd, cessation discussed and encouraged    Follow-up 1-2 months for progress check or sooner if concerns arise.      Brian Cameron MD  [facility]  [phone]  [address], [place] [address]  "

## 2024-05-28 ENCOUNTER — OFFICE VISIT (OUTPATIENT)
Dept: VASCULAR SURGERY | Facility: MEDICAL CENTER | Age: 64
End: 2024-05-28
Payer: OTHER MISCELLANEOUS

## 2024-05-28 VITALS
HEART RATE: 96 BPM | DIASTOLIC BLOOD PRESSURE: 56 MMHG | SYSTOLIC BLOOD PRESSURE: 96 MMHG | BODY MASS INDEX: 22.03 KG/M2 | OXYGEN SATURATION: 97 % | TEMPERATURE: 99.7 F | WEIGHT: 181 LBS

## 2024-05-28 DIAGNOSIS — I77.9 PAOD (PERIPHERAL ARTERIAL OCCLUSIVE DISEASE) (HCC): ICD-10-CM

## 2024-05-28 PROCEDURE — 3078F DIAST BP <80 MM HG: CPT | Performed by: SURGERY

## 2024-05-28 PROCEDURE — 3074F SYST BP LT 130 MM HG: CPT | Performed by: SURGERY

## 2024-05-28 PROCEDURE — 99212 OFFICE O/P EST SF 10 MIN: CPT | Performed by: SURGERY

## 2024-05-28 ASSESSMENT — FIBROSIS 4 INDEX: FIB4 SCORE: 0.92

## 2024-05-28 NOTE — PROGRESS NOTES
VASCULAR SURGERY                 Clinic Progress Note  _________________________________    Vitals for Today's Visit (5/28/2024)  BP 96/56 (BP Location: Left arm, Patient Position: Sitting, BP Cuff Size: Adult)   Pulse 96   Temp 37.6 °C (99.7 °F) (Temporal)   Wt 82.1 kg (181 lb)   SpO2 97%   BMI 22.03 kg/m²   _________________________________    1/31/24 Right fem-PT bypass with irGSV (Rakesh)    2/2/24 Redo right fem-PT with 6mm PTFE (Rakesh)    5/28/24  ***    Wound care, monitor, possible graft infection. 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                Brian Cameron MD  [facility]  [phone]  [address], [place] [address]

## 2024-06-20 ENCOUNTER — TELEPHONE (OUTPATIENT)
Dept: VASCULAR SURGERY | Facility: MEDICAL CENTER | Age: 64
End: 2024-06-20
Payer: OTHER MISCELLANEOUS

## 2024-06-20 ENCOUNTER — HOSPITAL ENCOUNTER (EMERGENCY)
Facility: MEDICAL CENTER | Age: 64
End: 2024-06-20
Payer: OTHER MISCELLANEOUS

## 2024-06-20 NOTE — TELEPHONE ENCOUNTER
Gilbert from [employer] called office to send message to  Dr. Cameron about patient possible graft Infection. Sent message to Rakesh to call Gilbert @ [phone]

## 2024-07-23 ENCOUNTER — OFFICE VISIT (OUTPATIENT)
Dept: VASCULAR SURGERY | Facility: MEDICAL CENTER | Age: 64
End: 2024-07-23
Payer: OTHER MISCELLANEOUS

## 2024-07-23 VITALS
HEART RATE: 82 BPM | OXYGEN SATURATION: 95 % | SYSTOLIC BLOOD PRESSURE: 102 MMHG | TEMPERATURE: 98.9 F | WEIGHT: 181 LBS | DIASTOLIC BLOOD PRESSURE: 68 MMHG | BODY MASS INDEX: 22.03 KG/M2

## 2024-07-23 DIAGNOSIS — L02.91 ABSCESS: ICD-10-CM

## 2024-07-23 PROCEDURE — 3074F SYST BP LT 130 MM HG: CPT | Performed by: SURGERY

## 2024-07-23 PROCEDURE — 3078F DIAST BP <80 MM HG: CPT | Performed by: SURGERY

## 2024-07-23 PROCEDURE — 99212 OFFICE O/P EST SF 10 MIN: CPT | Performed by: SURGERY

## 2024-07-23 RX ORDER — SODIUM HYPOCHLORITE 1.25 MG/ML
3 SOLUTION TOPICAL DAILY
Qty: 473 ML | Refills: 3 | Status: SHIPPED | OUTPATIENT
Start: 2024-07-23

## 2024-07-23 RX ORDER — SULFAMETHOXAZOLE AND TRIMETHOPRIM 800; 160 MG/1; MG/1
1 TABLET ORAL 2 TIMES DAILY
Qty: 20 TABLET | Refills: 0 | Status: SHIPPED | OUTPATIENT
Start: 2024-07-23

## 2024-07-23 ASSESSMENT — FIBROSIS 4 INDEX: FIB4 SCORE: 0.92

## 2024-10-15 ENCOUNTER — OFFICE VISIT (OUTPATIENT)
Dept: VASCULAR SURGERY | Facility: MEDICAL CENTER | Age: 64
End: 2024-10-15
Payer: MEDICARE

## 2024-10-15 ENCOUNTER — TELEPHONE (OUTPATIENT)
Dept: VASCULAR SURGERY | Facility: MEDICAL CENTER | Age: 64
End: 2024-10-15

## 2024-10-15 VITALS
SYSTOLIC BLOOD PRESSURE: 108 MMHG | DIASTOLIC BLOOD PRESSURE: 70 MMHG | HEART RATE: 72 BPM | TEMPERATURE: 98.9 F | OXYGEN SATURATION: 93 % | WEIGHT: 181 LBS | BODY MASS INDEX: 22.04 KG/M2 | HEIGHT: 76 IN

## 2024-10-15 DIAGNOSIS — T82.7XXA: ICD-10-CM

## 2024-10-15 PROCEDURE — 3078F DIAST BP <80 MM HG: CPT | Performed by: SURGERY

## 2024-10-15 PROCEDURE — 99213 OFFICE O/P EST LOW 20 MIN: CPT | Performed by: SURGERY

## 2024-10-15 PROCEDURE — 3074F SYST BP LT 130 MM HG: CPT | Performed by: SURGERY

## 2024-10-15 ASSESSMENT — FIBROSIS 4 INDEX: FIB4 SCORE: 0.93

## 2024-10-16 ENCOUNTER — APPOINTMENT (OUTPATIENT)
Dept: ADMISSIONS | Facility: MEDICAL CENTER | Age: 64
End: 2024-10-16
Attending: SURGERY
Payer: MEDICARE

## 2024-10-17 ENCOUNTER — TELEPHONE (OUTPATIENT)
Dept: VASCULAR SURGERY | Facility: MEDICAL CENTER | Age: 64
End: 2024-10-17
Payer: MEDICARE

## 2024-10-18 ENCOUNTER — TELEPHONE (OUTPATIENT)
Dept: VASCULAR SURGERY | Facility: MEDICAL CENTER | Age: 64
End: 2024-10-18
Payer: MEDICARE

## 2024-10-22 ENCOUNTER — PRE-ADMISSION TESTING (OUTPATIENT)
Dept: ADMISSIONS | Facility: MEDICAL CENTER | Age: 64
End: 2024-10-22
Attending: SURGERY
Payer: MEDICARE

## 2024-10-24 ENCOUNTER — TELEPHONE (OUTPATIENT)
Dept: VASCULAR SURGERY | Facility: MEDICAL CENTER | Age: 64
End: 2024-10-24
Payer: MEDICARE

## 2024-10-29 ENCOUNTER — ANESTHESIA EVENT (OUTPATIENT)
Dept: SURGERY | Facility: MEDICAL CENTER | Age: 64
End: 2024-10-29
Payer: MEDICARE

## 2024-10-29 ENCOUNTER — TELEPHONE (OUTPATIENT)
Dept: VASCULAR SURGERY | Facility: MEDICAL CENTER | Age: 64
End: 2024-10-29
Payer: MEDICARE

## 2024-10-29 NOTE — OR NURSING
Preadmit: Call to patient to encourage increased oral fluid intake the day prior to procedure/surgery including intake of electrolyte drinks such as Gatorade or electrolyte water. Patient may have clear liquids until 2 hours prior to surgery.  Surgery date 10/30/2024.

## 2024-10-30 ENCOUNTER — HOSPITAL ENCOUNTER (INPATIENT)
Facility: MEDICAL CENTER | Age: 64
LOS: 1 days | End: 2024-10-31
Attending: SURGERY | Admitting: SURGERY
Payer: MEDICARE

## 2024-10-30 ENCOUNTER — ANESTHESIA (OUTPATIENT)
Dept: SURGERY | Facility: MEDICAL CENTER | Age: 64
End: 2024-10-30
Payer: MEDICARE

## 2024-10-30 DIAGNOSIS — I77.9 PAOD (PERIPHERAL ARTERIAL OCCLUSIVE DISEASE) (HCC): ICD-10-CM

## 2024-10-30 DIAGNOSIS — G89.18 POSTOPERATIVE PAIN: ICD-10-CM

## 2024-10-30 LAB
ABO GROUP BLD: NORMAL
ALBUMIN SERPL BCP-MCNC: 3.9 G/DL (ref 3.2–4.9)
ALBUMIN/GLOB SERPL: 1.1 G/DL
ALP SERPL-CCNC: 95 U/L (ref 30–99)
ALT SERPL-CCNC: 13 U/L (ref 2–50)
ANION GAP SERPL CALC-SCNC: 9 MMOL/L (ref 7–16)
AST SERPL-CCNC: 25 U/L (ref 12–45)
BILIRUB SERPL-MCNC: 0.6 MG/DL (ref 0.1–1.5)
BLD GP AB SCN SERPL QL: NORMAL
BUN SERPL-MCNC: 12 MG/DL (ref 8–22)
CALCIUM ALBUM COR SERPL-MCNC: 10.4 MG/DL (ref 8.5–10.5)
CALCIUM SERPL-MCNC: 10.3 MG/DL (ref 8.5–10.5)
CHLORIDE SERPL-SCNC: 99 MMOL/L (ref 96–112)
CO2 SERPL-SCNC: 21 MMOL/L (ref 20–33)
CREAT SERPL-MCNC: 0.55 MG/DL (ref 0.5–1.4)
EKG IMPRESSION: NORMAL
ERYTHROCYTE [DISTWIDTH] IN BLOOD BY AUTOMATED COUNT: 43.3 FL (ref 35.9–50)
GFR SERPLBLD CREATININE-BSD FMLA CKD-EPI: 110 ML/MIN/1.73 M 2
GLOBULIN SER CALC-MCNC: 3.4 G/DL (ref 1.9–3.5)
GLUCOSE SERPL-MCNC: 105 MG/DL (ref 65–99)
HCT VFR BLD AUTO: 49.8 % (ref 42–52)
HGB BLD-MCNC: 17.3 G/DL (ref 14–18)
MCH RBC QN AUTO: 31.2 PG (ref 27–33)
MCHC RBC AUTO-ENTMCNC: 34.7 G/DL (ref 32.3–36.5)
MCV RBC AUTO: 89.7 FL (ref 81.4–97.8)
PLATELET # BLD AUTO: 259 K/UL (ref 164–446)
PMV BLD AUTO: 8.7 FL (ref 9–12.9)
POTASSIUM SERPL-SCNC: 4.1 MMOL/L (ref 3.6–5.5)
PROT SERPL-MCNC: 7.3 G/DL (ref 6–8.2)
RBC # BLD AUTO: 5.55 M/UL (ref 4.7–6.1)
RH BLD: NORMAL
SODIUM SERPL-SCNC: 129 MMOL/L (ref 135–145)
WBC # BLD AUTO: 13.5 K/UL (ref 4.8–10.8)

## 2024-10-30 PROCEDURE — 160028 HCHG SURGERY MINUTES - 1ST 30 MINS LEVEL 3: Performed by: SURGERY

## 2024-10-30 PROCEDURE — 86850 RBC ANTIBODY SCREEN: CPT

## 2024-10-30 PROCEDURE — 80053 COMPREHEN METABOLIC PANEL: CPT

## 2024-10-30 PROCEDURE — 700111 HCHG RX REV CODE 636 W/ 250 OVERRIDE (IP): Mod: JG,UD | Performed by: SURGERY

## 2024-10-30 PROCEDURE — 700111 HCHG RX REV CODE 636 W/ 250 OVERRIDE (IP): Mod: UD | Performed by: STUDENT IN AN ORGANIZED HEALTH CARE EDUCATION/TRAINING PROGRAM

## 2024-10-30 PROCEDURE — 85027 COMPLETE CBC AUTOMATED: CPT

## 2024-10-30 PROCEDURE — 04PY07Z REMOVAL OF AUTOLOGOUS TISSUE SUBSTITUTE FROM LOWER ARTERY, OPEN APPROACH: ICD-10-PCS | Performed by: SURGERY

## 2024-10-30 PROCEDURE — 700101 HCHG RX REV CODE 250: Performed by: STUDENT IN AN ORGANIZED HEALTH CARE EDUCATION/TRAINING PROGRAM

## 2024-10-30 PROCEDURE — 86901 BLOOD TYPING SEROLOGIC RH(D): CPT

## 2024-10-30 PROCEDURE — 86900 BLOOD TYPING SEROLOGIC ABO: CPT

## 2024-10-30 PROCEDURE — 700102 HCHG RX REV CODE 250 W/ 637 OVERRIDE(OP): Performed by: PHYSICIAN ASSISTANT

## 2024-10-30 PROCEDURE — 160002 HCHG RECOVERY MINUTES (STAT): Performed by: SURGERY

## 2024-10-30 PROCEDURE — A9270 NON-COVERED ITEM OR SERVICE: HCPCS | Mod: UD | Performed by: STUDENT IN AN ORGANIZED HEALTH CARE EDUCATION/TRAINING PROGRAM

## 2024-10-30 PROCEDURE — 160035 HCHG PACU - 1ST 60 MINS PHASE I: Performed by: SURGERY

## 2024-10-30 PROCEDURE — 35903 EXCISION GRAFT EXTREMITY: CPT | Mod: RT | Performed by: SURGERY

## 2024-10-30 PROCEDURE — 700111 HCHG RX REV CODE 636 W/ 250 OVERRIDE (IP): Mod: JZ,UD | Performed by: STUDENT IN AN ORGANIZED HEALTH CARE EDUCATION/TRAINING PROGRAM

## 2024-10-30 PROCEDURE — 160009 HCHG ANES TIME/MIN: Performed by: SURGERY

## 2024-10-30 PROCEDURE — 770001 HCHG ROOM/CARE - MED/SURG/GYN PRIV*

## 2024-10-30 PROCEDURE — 700102 HCHG RX REV CODE 250 W/ 637 OVERRIDE(OP): Mod: UD | Performed by: STUDENT IN AN ORGANIZED HEALTH CARE EDUCATION/TRAINING PROGRAM

## 2024-10-30 PROCEDURE — 700105 HCHG RX REV CODE 258: Mod: UD | Performed by: SURGERY

## 2024-10-30 PROCEDURE — 93005 ELECTROCARDIOGRAM TRACING: CPT | Performed by: SURGERY

## 2024-10-30 PROCEDURE — 160039 HCHG SURGERY MINUTES - EA ADDL 1 MIN LEVEL 3: Performed by: SURGERY

## 2024-10-30 PROCEDURE — 160048 HCHG OR STATISTICAL LEVEL 1-5: Performed by: SURGERY

## 2024-10-30 PROCEDURE — A9270 NON-COVERED ITEM OR SERVICE: HCPCS | Performed by: PHYSICIAN ASSISTANT

## 2024-10-30 PROCEDURE — 93010 ELECTROCARDIOGRAM REPORT: CPT | Performed by: INTERNAL MEDICINE

## 2024-10-30 RX ORDER — CLOPIDOGREL BISULFATE 75 MG/1
75 TABLET ORAL DAILY
Status: DISCONTINUED | OUTPATIENT
Start: 2024-10-31 | End: 2024-10-31 | Stop reason: HOSPADM

## 2024-10-30 RX ORDER — OXYCODONE HYDROCHLORIDE 5 MG/1
2.5 TABLET ORAL
Status: DISCONTINUED | OUTPATIENT
Start: 2024-10-30 | End: 2024-10-31 | Stop reason: HOSPADM

## 2024-10-30 RX ORDER — HYDROMORPHONE HYDROCHLORIDE 1 MG/ML
0.4 INJECTION, SOLUTION INTRAMUSCULAR; INTRAVENOUS; SUBCUTANEOUS
Status: DISCONTINUED | OUTPATIENT
Start: 2024-10-30 | End: 2024-10-30 | Stop reason: HOSPADM

## 2024-10-30 RX ORDER — ONDANSETRON 2 MG/ML
4 INJECTION INTRAMUSCULAR; INTRAVENOUS EVERY 4 HOURS PRN
Status: DISCONTINUED | OUTPATIENT
Start: 2024-10-30 | End: 2024-10-31 | Stop reason: HOSPADM

## 2024-10-30 RX ORDER — HYDROMORPHONE HYDROCHLORIDE 1 MG/ML
0.1 INJECTION, SOLUTION INTRAMUSCULAR; INTRAVENOUS; SUBCUTANEOUS
Status: DISCONTINUED | OUTPATIENT
Start: 2024-10-30 | End: 2024-10-30 | Stop reason: HOSPADM

## 2024-10-30 RX ORDER — SCOLOPAMINE TRANSDERMAL SYSTEM 1 MG/1
1 PATCH, EXTENDED RELEASE TRANSDERMAL
Status: DISCONTINUED | OUTPATIENT
Start: 2024-10-30 | End: 2024-10-31 | Stop reason: HOSPADM

## 2024-10-30 RX ORDER — HALOPERIDOL 5 MG/ML
1 INJECTION INTRAMUSCULAR EVERY 6 HOURS PRN
Status: DISCONTINUED | OUTPATIENT
Start: 2024-10-30 | End: 2024-10-31 | Stop reason: HOSPADM

## 2024-10-30 RX ORDER — DEXAMETHASONE SODIUM PHOSPHATE 4 MG/ML
4 INJECTION, SOLUTION INTRA-ARTICULAR; INTRALESIONAL; INTRAMUSCULAR; INTRAVENOUS; SOFT TISSUE
Status: DISCONTINUED | OUTPATIENT
Start: 2024-10-30 | End: 2024-10-31 | Stop reason: HOSPADM

## 2024-10-30 RX ORDER — HEPARIN SODIUM 5000 [USP'U]/ML
5000 INJECTION, SOLUTION INTRAVENOUS; SUBCUTANEOUS EVERY 8 HOURS
Status: DISCONTINUED | OUTPATIENT
Start: 2024-10-31 | End: 2024-10-31 | Stop reason: HOSPADM

## 2024-10-30 RX ORDER — OXYCODONE HCL 5 MG/5 ML
5 SOLUTION, ORAL ORAL
Status: COMPLETED | OUTPATIENT
Start: 2024-10-30 | End: 2024-10-30

## 2024-10-30 RX ORDER — CELECOXIB 200 MG/1
200 CAPSULE ORAL ONCE
Status: COMPLETED | OUTPATIENT
Start: 2024-10-30 | End: 2024-10-30

## 2024-10-30 RX ORDER — HYDROMORPHONE HYDROCHLORIDE 1 MG/ML
0.25 INJECTION, SOLUTION INTRAMUSCULAR; INTRAVENOUS; SUBCUTANEOUS
Status: DISCONTINUED | OUTPATIENT
Start: 2024-10-30 | End: 2024-10-31 | Stop reason: HOSPADM

## 2024-10-30 RX ORDER — DIPHENHYDRAMINE HYDROCHLORIDE 50 MG/ML
12.5 INJECTION INTRAMUSCULAR; INTRAVENOUS
Status: DISCONTINUED | OUTPATIENT
Start: 2024-10-30 | End: 2024-10-30 | Stop reason: HOSPADM

## 2024-10-30 RX ORDER — ONDANSETRON 2 MG/ML
4 INJECTION INTRAMUSCULAR; INTRAVENOUS
Status: DISCONTINUED | OUTPATIENT
Start: 2024-10-30 | End: 2024-10-30 | Stop reason: HOSPADM

## 2024-10-30 RX ORDER — OXYCODONE HYDROCHLORIDE 5 MG/1
5 TABLET ORAL
Status: DISCONTINUED | OUTPATIENT
Start: 2024-10-30 | End: 2024-10-31 | Stop reason: HOSPADM

## 2024-10-30 RX ORDER — HYDROMORPHONE HYDROCHLORIDE 1 MG/ML
0.2 INJECTION, SOLUTION INTRAMUSCULAR; INTRAVENOUS; SUBCUTANEOUS
Status: DISCONTINUED | OUTPATIENT
Start: 2024-10-30 | End: 2024-10-30 | Stop reason: HOSPADM

## 2024-10-30 RX ORDER — SODIUM CHLORIDE 9 MG/ML
1000 INJECTION, SOLUTION INTRAVENOUS CONTINUOUS
Status: DISCONTINUED | OUTPATIENT
Start: 2024-10-30 | End: 2024-10-31 | Stop reason: HOSPADM

## 2024-10-30 RX ORDER — ATORVASTATIN CALCIUM 40 MG/1
40 TABLET, FILM COATED ORAL DAILY
Status: DISCONTINUED | OUTPATIENT
Start: 2024-10-31 | End: 2024-10-31 | Stop reason: HOSPADM

## 2024-10-30 RX ORDER — LABETALOL HYDROCHLORIDE 5 MG/ML
10 INJECTION, SOLUTION INTRAVENOUS EVERY 4 HOURS PRN
Status: DISCONTINUED | OUTPATIENT
Start: 2024-10-30 | End: 2024-10-31 | Stop reason: HOSPADM

## 2024-10-30 RX ORDER — LIDOCAINE HYDROCHLORIDE 40 MG/ML
SOLUTION TOPICAL PRN
Status: DISCONTINUED | OUTPATIENT
Start: 2024-10-30 | End: 2024-10-30 | Stop reason: SURG

## 2024-10-30 RX ORDER — ONDANSETRON 2 MG/ML
INJECTION INTRAMUSCULAR; INTRAVENOUS PRN
Status: DISCONTINUED | OUTPATIENT
Start: 2024-10-30 | End: 2024-10-30 | Stop reason: SURG

## 2024-10-30 RX ORDER — HYDRALAZINE HYDROCHLORIDE 20 MG/ML
10 INJECTION INTRAMUSCULAR; INTRAVENOUS EVERY 4 HOURS PRN
Status: DISCONTINUED | OUTPATIENT
Start: 2024-10-30 | End: 2024-10-31 | Stop reason: HOSPADM

## 2024-10-30 RX ORDER — BUPIVACAINE HYDROCHLORIDE 5 MG/ML
INJECTION, SOLUTION EPIDURAL; INTRACAUDAL
Status: DISCONTINUED | OUTPATIENT
Start: 2024-10-30 | End: 2024-10-30 | Stop reason: HOSPADM

## 2024-10-30 RX ORDER — OXYCODONE HCL 5 MG/5 ML
10 SOLUTION, ORAL ORAL
Status: COMPLETED | OUTPATIENT
Start: 2024-10-30 | End: 2024-10-30

## 2024-10-30 RX ORDER — ALBUTEROL SULFATE 5 MG/ML
2.5 SOLUTION RESPIRATORY (INHALATION)
Status: DISCONTINUED | OUTPATIENT
Start: 2024-10-30 | End: 2024-10-30 | Stop reason: HOSPADM

## 2024-10-30 RX ORDER — DEXAMETHASONE SODIUM PHOSPHATE 4 MG/ML
INJECTION, SOLUTION INTRA-ARTICULAR; INTRALESIONAL; INTRAMUSCULAR; INTRAVENOUS; SOFT TISSUE PRN
Status: DISCONTINUED | OUTPATIENT
Start: 2024-10-30 | End: 2024-10-30 | Stop reason: SURG

## 2024-10-30 RX ORDER — DIPHENHYDRAMINE HYDROCHLORIDE 50 MG/ML
25 INJECTION INTRAMUSCULAR; INTRAVENOUS EVERY 6 HOURS PRN
Status: DISCONTINUED | OUTPATIENT
Start: 2024-10-30 | End: 2024-10-31 | Stop reason: HOSPADM

## 2024-10-30 RX ORDER — HALOPERIDOL 5 MG/ML
1 INJECTION INTRAMUSCULAR
Status: DISCONTINUED | OUTPATIENT
Start: 2024-10-30 | End: 2024-10-30 | Stop reason: HOSPADM

## 2024-10-30 RX ORDER — HYDRALAZINE HYDROCHLORIDE 20 MG/ML
5 INJECTION INTRAMUSCULAR; INTRAVENOUS
Status: DISCONTINUED | OUTPATIENT
Start: 2024-10-30 | End: 2024-10-30 | Stop reason: HOSPADM

## 2024-10-30 RX ORDER — EPHEDRINE SULFATE 50 MG/ML
5 INJECTION, SOLUTION INTRAVENOUS
Status: DISCONTINUED | OUTPATIENT
Start: 2024-10-30 | End: 2024-10-30 | Stop reason: HOSPADM

## 2024-10-30 RX ORDER — ACETAMINOPHEN 500 MG
1000 TABLET ORAL ONCE
Status: COMPLETED | OUTPATIENT
Start: 2024-10-30 | End: 2024-10-30

## 2024-10-30 RX ORDER — SULFAMETHOXAZOLE AND TRIMETHOPRIM 800; 160 MG/1; MG/1
1 TABLET ORAL EVERY 12 HOURS
Status: DISCONTINUED | OUTPATIENT
Start: 2024-10-30 | End: 2024-10-31 | Stop reason: HOSPADM

## 2024-10-30 RX ORDER — CEFAZOLIN SODIUM 1 G/3ML
INJECTION, POWDER, FOR SOLUTION INTRAMUSCULAR; INTRAVENOUS PRN
Status: DISCONTINUED | OUTPATIENT
Start: 2024-10-30 | End: 2024-10-30 | Stop reason: SURG

## 2024-10-30 RX ADMIN — ACETAMINOPHEN 1000 MG: 500 TABLET ORAL at 10:18

## 2024-10-30 RX ADMIN — HYDROMORPHONE HYDROCHLORIDE 0.4 MG: 1 INJECTION, SOLUTION INTRAMUSCULAR; INTRAVENOUS; SUBCUTANEOUS at 13:31

## 2024-10-30 RX ADMIN — HYDROMORPHONE HYDROCHLORIDE 0.4 MG: 1 INJECTION, SOLUTION INTRAMUSCULAR; INTRAVENOUS; SUBCUTANEOUS at 13:36

## 2024-10-30 RX ADMIN — DEXAMETHASONE SODIUM PHOSPHATE 4 MG: 4 INJECTION INTRA-ARTICULAR; INTRALESIONAL; INTRAMUSCULAR; INTRAVENOUS; SOFT TISSUE at 12:24

## 2024-10-30 RX ADMIN — FENTANYL CITRATE 50 MCG: 50 INJECTION, SOLUTION INTRAMUSCULAR; INTRAVENOUS at 12:50

## 2024-10-30 RX ADMIN — CEFAZOLIN 2 G: 1 INJECTION, POWDER, FOR SOLUTION INTRAMUSCULAR; INTRAVENOUS at 12:18

## 2024-10-30 RX ADMIN — PROPOFOL 150 MG: 10 INJECTION, EMULSION INTRAVENOUS at 12:18

## 2024-10-30 RX ADMIN — SULFAMETHOXAZOLE AND TRIMETHOPRIM 1 TABLET: 800; 160 TABLET ORAL at 20:35

## 2024-10-30 RX ADMIN — SUGAMMADEX 200 MG: 100 INJECTION, SOLUTION INTRAVENOUS at 13:04

## 2024-10-30 RX ADMIN — HYDROMORPHONE HYDROCHLORIDE 0.2 MG: 1 INJECTION, SOLUTION INTRAMUSCULAR; INTRAVENOUS; SUBCUTANEOUS at 14:10

## 2024-10-30 RX ADMIN — OXYCODONE HYDROCHLORIDE 10 MG: 5 SOLUTION ORAL at 13:40

## 2024-10-30 RX ADMIN — CELECOXIB 200 MG: 200 CAPSULE ORAL at 10:18

## 2024-10-30 RX ADMIN — FENTANYL CITRATE 100 MCG: 50 INJECTION, SOLUTION INTRAMUSCULAR; INTRAVENOUS at 12:18

## 2024-10-30 RX ADMIN — LIDOCAINE HYDROCHLORIDE 4 ML: 40 SOLUTION TOPICAL at 12:19

## 2024-10-30 RX ADMIN — SODIUM CHLORIDE: 9 INJECTION, SOLUTION INTRAVENOUS at 12:13

## 2024-10-30 RX ADMIN — FENTANYL CITRATE 50 MCG: 50 INJECTION, SOLUTION INTRAMUSCULAR; INTRAVENOUS at 13:23

## 2024-10-30 RX ADMIN — HYDROMORPHONE HYDROCHLORIDE 0.2 MG: 1 INJECTION, SOLUTION INTRAMUSCULAR; INTRAVENOUS; SUBCUTANEOUS at 13:47

## 2024-10-30 RX ADMIN — HYDROMORPHONE HYDROCHLORIDE 0.4 MG: 1 INJECTION, SOLUTION INTRAMUSCULAR; INTRAVENOUS; SUBCUTANEOUS at 14:00

## 2024-10-30 RX ADMIN — FENTANYL CITRATE 50 MCG: 50 INJECTION, SOLUTION INTRAMUSCULAR; INTRAVENOUS at 13:56

## 2024-10-30 RX ADMIN — FENTANYL CITRATE 50 MCG: 50 INJECTION, SOLUTION INTRAMUSCULAR; INTRAVENOUS at 13:50

## 2024-10-30 RX ADMIN — HYDROMORPHONE HYDROCHLORIDE 0.4 MG: 1 INJECTION, SOLUTION INTRAMUSCULAR; INTRAVENOUS; SUBCUTANEOUS at 13:53

## 2024-10-30 RX ADMIN — ROCURONIUM BROMIDE 50 MG: 10 INJECTION, SOLUTION INTRAVENOUS at 12:19

## 2024-10-30 RX ADMIN — ONDANSETRON 4 MG: 2 INJECTION INTRAMUSCULAR; INTRAVENOUS at 13:04

## 2024-10-30 RX ADMIN — FENTANYL CITRATE 50 MCG: 50 INJECTION, SOLUTION INTRAMUSCULAR; INTRAVENOUS at 13:27

## 2024-10-30 ASSESSMENT — PAIN DESCRIPTION - PAIN TYPE
TYPE: ACUTE PAIN;SURGICAL PAIN
TYPE: ACUTE PAIN

## 2024-10-30 ASSESSMENT — FIBROSIS 4 INDEX
FIB4 SCORE: 0.93
FIB4 SCORE: 0.93

## 2024-10-30 ASSESSMENT — PAIN SCALES - GENERAL: PAIN_LEVEL: 2

## 2024-10-30 NOTE — OP REPORT
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    Procedure:  -Removal of right lower extremity bypass graft    _____________________________________________________    Surgeon:                                 Brian Cameron MD    Assistant:   Lisa Hess PA-C    Anesthesia:                             General anesthesia plus local anesthetic    EBL:                                        minimal     Complications:                        none    Disposition:                             Tolerated well, sent to recovery in stable condition    Justification for use of Surgical First Assist:  An experienced first assistant was utilized during this operation due to the complexity of the operation.  My assistant participated with patient preparation for surgery, incision, surgical exposure including retraction, dissection, and ligation to isolate the target structures and preserve nearby structures, and closure of the field of dissection.  The presence of the expert assist increased the efficiency of the operation and decreased the risk of intraoperative surgical complications.    _____________________________________________________      History:  64 y.o. male presenting with ***.      I conducted a thorough preoperative discussion regarding our findings and recommendations.  I explained the operation, alternatives, and potential risks, including but not limited to bleeding, infection, injury to vessels or nerves, possible multiple incisions, use of xray and contrast exposure, risks of  anesthesia, and global risks such as stroke, heart attack, pulmonary complications, and even potentially not surviving the operation or the recovery.  All questions were answered. They understand and agree to proceed.    Procedure Summary:  Following informed consent, the patient was placed supine on the operating table, and general anesthesia was administered.  The patient was prepped and draped sterile in the usual fashion. Surgical time-out was called, and everyone was in agreement.  The patient did receive preoperative prophylactic antibiotics.      ***    The patient was then sent to recovery in stable condition.  All counts were correct at the conclusion of the case.       Postoperative Plan:    ***      {Vascular Consultants:[id number]}  [facility]       from the thigh all the way down to the lower leg came out in one piece.  I also extracted the distal portion of the graft as well.  It is possible there could be some residual graft material near the ankle however an incision that far distally would not be likely to heal due to his poor perfusion and therefore I opted not to make an incision to extract the last residual piece of graft but rather I will follow him clinically and if he develops evidence of ongoing infection then we could reexplore to remove any residual graft material.    All the incisions were irrigated with hydroperoxide and saline.  The incisions were packed with iodoform gauze and gently reapproximated with interrupted 2-0 nylon sutures and bandages were applied.    The patient was then sent to recovery in stable condition.  All counts were correct at the conclusion of the case.    Postoperative Plan:    Packing will be removed tomorrow.  Patient will most likely be able to discharge home tomorrow.  Ongoing postoperative care will be arranged through the outpatient vascular clinic.      Brian Cameron MD  [facility]

## 2024-10-30 NOTE — PROGRESS NOTES
Medication history reviewed with PT at bedside    [facility] rec is complete per PT reporting    Allergies reviewed.     Patient denies any outpatient antibiotics in the last 30 days.     Patient is not taking anticoagulants.    Preferred pharmacy for this visit - [facility] in [place] ([phone])

## 2024-10-31 ENCOUNTER — PHARMACY VISIT (OUTPATIENT)
Dept: PHARMACY | Facility: MEDICAL CENTER | Age: 64
End: 2024-10-31
Payer: COMMERCIAL

## 2024-10-31 VITALS
TEMPERATURE: 97.9 F | HEIGHT: 76 IN | RESPIRATION RATE: 18 BRPM | SYSTOLIC BLOOD PRESSURE: 128 MMHG | WEIGHT: 197.75 LBS | DIASTOLIC BLOOD PRESSURE: 78 MMHG | BODY MASS INDEX: 24.08 KG/M2 | HEART RATE: 69 BPM | OXYGEN SATURATION: 95 %

## 2024-10-31 PROCEDURE — 700102 HCHG RX REV CODE 250 W/ 637 OVERRIDE(OP): Performed by: PHYSICIAN ASSISTANT

## 2024-10-31 PROCEDURE — 700111 HCHG RX REV CODE 636 W/ 250 OVERRIDE (IP): Performed by: PHYSICIAN ASSISTANT

## 2024-10-31 PROCEDURE — RXMED WILLOW AMBULATORY MEDICATION CHARGE: Performed by: SURGERY

## 2024-10-31 PROCEDURE — A9270 NON-COVERED ITEM OR SERVICE: HCPCS | Performed by: PHYSICIAN ASSISTANT

## 2024-10-31 RX ORDER — HYDROCODONE BITARTRATE AND ACETAMINOPHEN 5; 325 MG/1; MG/1
1-2 TABLET ORAL EVERY 6 HOURS PRN
Qty: 15 TABLET | Refills: 0 | Status: SHIPPED | OUTPATIENT
Start: 2024-10-31 | End: 2024-11-03

## 2024-10-31 RX ORDER — SULFAMETHOXAZOLE AND TRIMETHOPRIM 800; 160 MG/1; MG/1
1 TABLET ORAL 2 TIMES DAILY
Qty: 28 TABLET | Refills: 0 | Status: ACTIVE | OUTPATIENT
Start: 2024-10-31 | End: 2024-11-14

## 2024-10-31 RX ADMIN — ATORVASTATIN CALCIUM 40 MG: 40 TABLET, FILM COATED ORAL at 05:32

## 2024-10-31 RX ADMIN — CLOPIDOGREL BISULFATE 75 MG: 75 TABLET ORAL at 05:32

## 2024-10-31 RX ADMIN — HEPARIN SODIUM 5000 UNITS: 5000 INJECTION, SOLUTION INTRAVENOUS; SUBCUTANEOUS at 05:33

## 2024-10-31 RX ADMIN — SULFAMETHOXAZOLE AND TRIMETHOPRIM 1 TABLET: 800; 160 TABLET ORAL at 05:32

## 2024-10-31 ASSESSMENT — COGNITIVE AND FUNCTIONAL STATUS - GENERAL
HELP NEEDED FOR BATHING: A LITTLE
SUGGESTED CMS G CODE MODIFIER DAILY ACTIVITY: CK
MOVING TO AND FROM BED TO CHAIR: A LITTLE
SUGGESTED CMS G CODE MODIFIER MOBILITY: CK
STANDING UP FROM CHAIR USING ARMS: A LITTLE
TOILETING: A LITTLE
WALKING IN HOSPITAL ROOM: A LITTLE
DRESSING REGULAR UPPER BODY CLOTHING: A LITTLE
MOBILITY SCORE: 18
DRESSING REGULAR LOWER BODY CLOTHING: A LITTLE
DAILY ACTIVITIY SCORE: 18
MOVING FROM LYING ON BACK TO SITTING ON SIDE OF FLAT BED: A LITTLE
EATING MEALS: A LITTLE
CLIMB 3 TO 5 STEPS WITH RAILING: A LITTLE
TURNING FROM BACK TO SIDE WHILE IN FLAT BAD: A LITTLE
PERSONAL GROOMING: A LITTLE

## 2024-10-31 ASSESSMENT — SOCIAL DETERMINANTS OF HEALTH (SDOH)
WITHIN THE LAST YEAR, HAVE YOU BEEN HUMILIATED OR EMOTIONALLY ABUSED IN OTHER WAYS BY YOUR PARTNER OR EX-PARTNER?: NO
WITHIN THE LAST YEAR, HAVE YOU BEEN KICKED, HIT, SLAPPED, OR OTHERWISE PHYSICALLY HURT BY YOUR PARTNER OR EX-PARTNER?: NO
WITHIN THE LAST YEAR, HAVE YOU BEEN AFRAID OF YOUR PARTNER OR EX-PARTNER?: NO
WITHIN THE LAST YEAR, HAVE TO BEEN RAPED OR FORCED TO HAVE ANY KIND OF SEXUAL ACTIVITY BY YOUR PARTNER OR EX-PARTNER?: NO

## 2024-10-31 ASSESSMENT — LIFESTYLE VARIABLES
TOTAL SCORE: 0
CONSUMPTION TOTAL: POSITIVE
DOES PATIENT WANT TO STOP DRINKING: NO
HAVE YOU EVER FELT YOU SHOULD CUT DOWN ON YOUR DRINKING: NO
ON A TYPICAL DAY WHEN YOU DRINK ALCOHOL HOW MANY DRINKS DO YOU HAVE: 1
TOTAL SCORE: 0
TOTAL SCORE: 0
ALCOHOL_USE: YES
HAVE PEOPLE ANNOYED YOU BY CRITICIZING YOUR DRINKING: NO
HOW MANY TIMES IN THE PAST YEAR HAVE YOU HAD 5 OR MORE DRINKS IN A DAY: 10
EVER HAD A DRINK FIRST THING IN THE MORNING TO STEADY YOUR NERVES TO GET RID OF A HANGOVER: NO
EVER FELT BAD OR GUILTY ABOUT YOUR DRINKING: NO
AVERAGE NUMBER OF DAYS PER WEEK YOU HAVE A DRINK CONTAINING ALCOHOL: 3

## 2024-10-31 ASSESSMENT — PAIN DESCRIPTION - PAIN TYPE
TYPE: ACUTE PAIN
TYPE: ACUTE PAIN

## 2024-10-31 ASSESSMENT — FIBROSIS 4 INDEX
FIB4 SCORE: 1.74
FIB4 SCORE: 1.74

## 2024-10-31 NOTE — CARE PLAN
The patient is Stable - Low risk of patient condition declining or worsening    Shift Goals  Clinical Goals: monitor surgical site  Patient Goals: comfort, rest    Progress made toward(s) clinical / shift goals:  Dressing changed on RLE surgical site, pt medicated per MAR       Problem: Pain - Standard  Goal: Alleviation of pain or a reduction in pain to the patient’s comfort goal  Outcome: Progressing     Problem: Fall Risk  Goal: Patient will remain free from falls  Outcome: Progressing     Problem: Knowledge Deficit - Standard  Goal: Patient and family/care givers will demonstrate understanding of plan of care, disease process/condition, diagnostic tests and medications  Outcome: Progressing

## 2024-10-31 NOTE — PROGRESS NOTES
The patient is leaving the unit with staff for discharge lounge. Belongings sent with patient. Patient is in personal wheelchair.

## 2024-10-31 NOTE — PROGRESS NOTES
Bedside report received from night shift nurse. Assumed care at 0645.   Pt A&Ox4  Tolerating regular diet, denies n/v. + bowel sounds, + flatus, LBM PTA. IV access through 18G LFA that is SL.  Saturating >90% on RA.  Pt ambulates SBA pivot to wheelchair.  Previous L BKA.  RLE surgical incisions with DIP, noted drainage. MD changed dressing at bedside.   Pain is controlled through medication orders. Updated on plan of care. Safety education provided. Bed locked in low. Call light within reach. Rounding in place.

## 2024-10-31 NOTE — PROGRESS NOTES
Bedside report received by SHELLEY grewal  Assessment complete.  A&O x 4. Patient calls appropriately.  Patient ambulates with x1 assist pivot to wheelchair Bed alarm on.   Patient has 4/10 pain. Patient medicated per MAR.  Denies N&V. Tolerating regular diet.  - void, + flatus, - BM. PTA  Patient denies SOB.  SCD's off.  Patient is resting.  Review plan with of care with patient. Call light and personal belongings within reach. Hourly rounding in place. All needs met at this time.

## 2024-10-31 NOTE — PROGRESS NOTES
Patient arrived to discharge lounge. PIV removed, tip intact. Discussed discharge paperwork and medications with patient. Answered all questions. No home meds to return, meds to beds given to patient. Patient escorted out via wheelchair, personal belongings in hand.

## 2024-10-31 NOTE — PROGRESS NOTES
Virtual Nurse rounding complete.  Rounding Needs: Patient resting comfortably at this time. Would like a cup of coffee and to have his urinal dumped. CNA notified.

## 2024-10-31 NOTE — PROGRESS NOTES
Assumed care of patient at 1730. Telephone report received.   PACU RN's brought patient to floor and transferred to bed. Alerted RN that patient's groin incision was bleeding. Pressure applied, Rakesh notified. Rakesh confirmed pressure was appropriate at this time, sand bag applied to patient thigh. Dressing changed per Rakesh, no bleeding or hematoma seen at this time.   Assessment complete.  AA&Ox4. Denies CP/SOB. 3LNC  Reporting pain controlled at this time.   Skin per flow sheets. Incision to left groin area, LLE x2 gauze and teg w/drainage. Prior LBKA  Tolerating diet. Denies N/V.  - void. - BM.   Pt is WC baseline r/t BKA  Fall prevention measures in place per flowsheets.  Pharmacologic VTE prophylaxis in use.  Plan of care discussed, all questions answered.  Educated regarding importance of oral care. Oral care kit at bedside. Call light is within reach, treaded slipper socks on, bed in lowest/ locked position, hourly rounding in place, all needs met at this time.

## 2024-10-31 NOTE — OR NURSING
Report to Shari  Removal of infected right lower extremity bypass graft by Dr Cameron. 2 lower dressings w/ gauze/tegaderm and 1 inner thigh dressing w/ iodoform packing gauze/tegaderm. Drainage present. Lisa Cameron's assistant said to leave dressing on and she and Dr Cameron will unpack tomorrow.     Reviewed other pertinent information. All questions answered.

## 2024-10-31 NOTE — PROGRESS NOTES
VASCULAR SURGERY SERVICE                        Progress Note  _________________________________    Doing well  Packing removed, new bandages applied    Home today  Norco and Bactrim Rx through [facility]    Brian Cameron MD  [facility]   Voalte preferred or call my office [phone]  __________________________________________________  Patient:Antonio Cantu   MRN:[id number]

## 2024-11-02 NOTE — DOCUMENTATION QUERY
[facility]                                                                       Query Response Note      PATIENT:               DENNIS FARLEY  ACCT #:                  [id number]  MRN:                     [id number]  :                      1960  ADMIT DATE:       10/30/2024 8:45 AM  DISCH DATE:        10/31/2024 10:56 AM  RESPONDING  PROVIDER #:        [id number]           QUERY TEXT:    The patient has a clinical finding of sodium 129. Please clarify whether the following has been treated/evaluated during this hospitalization:     Please add your response to this query in your progress notes if you agree, thank you.    The patient's Clinical Indicators include:  10/30 Labs: Sodium 129    Risk factor: Sodium 129    Treatment: IVF    Thank you,  Chelsey Tong NP, CCDS   Clinical   Connect via [employer]  Options provided:   -- Hyponatremia   -- Other explanation, (please specify)      Query created by: Chelsey Tong on 2024 10:46 AM    RESPONSE TEXT:    Hyponatremia          Electronically signed by:  KANA DUNCAN MD 2024 2:30 AM

## 2024-11-19 ENCOUNTER — OFFICE VISIT (OUTPATIENT)
Dept: VASCULAR SURGERY | Facility: MEDICAL CENTER | Age: 64
End: 2024-11-19
Payer: MEDICARE

## 2024-11-19 VITALS
BODY MASS INDEX: 23.14 KG/M2 | SYSTOLIC BLOOD PRESSURE: 120 MMHG | WEIGHT: 190 LBS | OXYGEN SATURATION: 89 % | DIASTOLIC BLOOD PRESSURE: 70 MMHG | HEART RATE: 95 BPM | HEIGHT: 76 IN | TEMPERATURE: 98.8 F

## 2024-11-19 DIAGNOSIS — I77.9 PAOD (PERIPHERAL ARTERIAL OCCLUSIVE DISEASE) (HCC): ICD-10-CM

## 2024-11-19 PROCEDURE — 3078F DIAST BP <80 MM HG: CPT | Performed by: SURGERY

## 2024-11-19 PROCEDURE — 99024 POSTOP FOLLOW-UP VISIT: CPT | Performed by: SURGERY

## 2024-11-19 PROCEDURE — 3074F SYST BP LT 130 MM HG: CPT | Performed by: SURGERY

## 2024-11-19 ASSESSMENT — FIBROSIS 4 INDEX: FIB4 SCORE: 1.74

## 2024-11-19 NOTE — PROGRESS NOTES
"                 VASCULAR SURGERY                 Clinic Progress Note  _________________________________    Vitals for Today's Visit (11/19/2024)  /70 (BP Location: Left arm, Patient Position: Sitting, BP Cuff Size: Adult)   Pulse 95   Temp 37.1 °C (98.8 °F) (Temporal)   Ht 1.93 m (6' 4\")   Wt 86.2 kg (190 lb)   SpO2 89%   BMI 23.13 kg/m²   _________________________________    10/30/24 removal of infected thrombosed right lower extremity prosthetic bypass graft (Rakesh/MIGEL)    11/19/2024  Patient presents for his first follow-up visit after removal of his infected prosthetic bypass graft.  He is feeling well.  Incisions are healing well.  No evidence of current infection.  He removed his sutures at home and there does not appear to be any issues there.  Right lower extremity perfusion is stable, no foot wounds.  No specific concerns at this time.  Patient will follow-up with me on an as-needed basis if concerns arise.        Brian Cameron MD  [facility]  [phone]  [address], [place] [address]  "

## 2025-01-06 DIAGNOSIS — I73.9 PAD (PERIPHERAL ARTERY DISEASE) (HCC): ICD-10-CM

## 2025-01-06 RX ORDER — ATORVASTATIN CALCIUM 40 MG/1
40 TABLET, FILM COATED ORAL DAILY
Qty: 30 TABLET | Refills: 0 | OUTPATIENT
Start: 2025-01-06

## (undated) DEVICE — DRAPE LARGE 3 QUARTER - (20/CA)

## (undated) DEVICE — COVER PROBE INTRAOPERATIVE KIT (10EA/CA)

## (undated) DEVICE — SUTURE 0 VICRYL PLUS CT-1 - 36 INCH (36/BX)

## (undated) DEVICE — COVER LIGHT HANDLE ALC PLUS DISP (18EA/BX)

## (undated) DEVICE — CONTAINER SPECIMEN BAG OR - STERILE 4 OZ W/LID (100EA/CA)

## (undated) DEVICE — TOWELS CLOTH SURGICAL - (4/PK 20PK/CA)

## (undated) DEVICE — SYRINGE 20 ML LL (50EA/BX 4BX/CA)

## (undated) DEVICE — SLEEVE, VASO, THIGH, MED

## (undated) DEVICE — ELECTRODE DUAL RETURN W/ CORD - (50/PK)

## (undated) DEVICE — SENSOR OXIMETER ADULT SPO2 RD SET (20EA/BX)

## (undated) DEVICE — GLOVE BIOGEL INDICATOR SZ 7.5 SURGICAL PF LTX - (50PR/BX 4BX/CA)

## (undated) DEVICE — DRAPE LOWER EXTREMETY - (6/CA)

## (undated) DEVICE — GLOVE BIOGEL INDICATOR SZ 8 SURGICAL PF LTX - (50/BX 4BX/CA)

## (undated) DEVICE — CANISTER SUCTION 3000ML MECHANICAL FILTER AUTO SHUTOFF MEDI-VAC NONSTERILE LF DISP (40EA/CA)

## (undated) DEVICE — SUTURE CV

## (undated) DEVICE — SPONGE GAUZESTER 4 X 4 4PLY - (128PK/CA)

## (undated) DEVICE — GLOVE BIOGEL PI INDICATOR SZ 6.5 SURGICAL PF LF - (50/BX 4BX/CA)

## (undated) DEVICE — PROTECTOR ULNA NERVE - (36PR/CA)

## (undated) DEVICE — DRAPE IOBAN II 23 IN X 33 IN - (10/BX)

## (undated) DEVICE — FILTER BLOOD TRANSFUSION - (40/CA) (PALL)

## (undated) DEVICE — NEPTUNE 4 PORT MANIFOLD - (20/PK)

## (undated) DEVICE — STOPCOCK 3-WAY W/SWIVEL LEVER LOCK (50EA/CA)

## (undated) DEVICE — TRANSDUCER ADULT DISP. SINGLE BONDED STERILE - (20EA/CA)

## (undated) DEVICE — VESSELOOP MINI BLUE STERILE - SURG-I-LOOP (10EA/BX)

## (undated) DEVICE — GLIDEWIRE ANGLED .035 X 180 (5EA/BX)

## (undated) DEVICE — DECANTER FLD BLS - (50/CA)

## (undated) DEVICE — DRESSING TRANSPARENT FILM TEGADERM 2.375 X 2.75"  (100EA/BX)"

## (undated) DEVICE — CHLORAPREP 26 ML APPLICATOR - ORANGE TINT(25/CA)

## (undated) DEVICE — KIT SURGIFLO W/OUT THROMBIN - (6EA/CA)

## (undated) DEVICE — CANISTER SUCTION 3000ML MECHANICAL FILTER AUTO SHUTOFF MEDI-VAC NONSTERILE LF DISP  (40EA/CA)

## (undated) DEVICE — SET INTRO MIRCROPUNCTURE - MPIS-501-SST

## (undated) DEVICE — SUTURE ETHILON 2-0 FSLX 30 (36PK/BX)"

## (undated) DEVICE — SET FLUID WARMING STANDARD FLOW - (10/CA)

## (undated) DEVICE — BANDAGE ELASTIC 6 HONEYCOMB - 6X5YD LF (20/CA)"

## (undated) DEVICE — SHEATH RO 6F 25CM (10EA/BX)

## (undated) DEVICE — SUTURE 3-0 VICRYL PLUS SH - 8X 18 INCH (12/BX)

## (undated) DEVICE — COVER PROBE STERILE CONE (12EA/CA)

## (undated) DEVICE — GLOVE BIOGEL INDICATOR SZ 6.5 SURGICAL PF LTX - (50PR/BX 4BX/CA)

## (undated) DEVICE — PACK MAJOR BASIN - (2EA/CA)

## (undated) DEVICE — GLOVE SZ 6.5 BIOGEL PI MICRO - PF LF (50PR/BX)

## (undated) DEVICE — SYRINGE SAFETY 10 ML 18 GA X 1 1/2 BLUNT LL (100/BX 4BX/CA)

## (undated) DEVICE — GUIDEWIRE 25CM .35 180CM ANGLED GLIDEWIRE ADVANTAGE (1/BX)

## (undated) DEVICE — DRAPE 36X28IN RAD CARM BND BG - (25/CA) O

## (undated) DEVICE — SENSOR SPO2 NEO LNCS ADHESIVE (20/BX) SEE USER NOTES

## (undated) DEVICE — SUCTION INSTRUMENT YANKAUER BULBOUS TIP W/O VENT (50EA/CA)

## (undated) DEVICE — GLOVE BIOGEL PI ORTHO SZ 7 PF LF (40PR/BX)

## (undated) DEVICE — VESSELOOP MAXI BLUE STERILE- SURG-I-LOOP (10EA/BX)

## (undated) DEVICE — MEDICINE CUP STERILE 2 OZ - (100/CA)

## (undated) DEVICE — SUTURE 6-0 PROLENE C-1 D/A 24 (36PK/BX)"

## (undated) DEVICE — GOWN SURGEONS X-LARGE - DISP. (30/CA)

## (undated) DEVICE — ELECTRODE 850 FOAM ADHESIVE - HYDROGEL RADIOTRNSPRNT (50/PK)

## (undated) DEVICE — SUTURE GENERAL

## (undated) DEVICE — SET BIFURCATED BLOOD - (48EA/CS)

## (undated) DEVICE — SYRINGE 30 ML LL (56/BX)

## (undated) DEVICE — SYRINGE 3 CC 22 GA X 1-1/2 - NDL SAFETY (50/BX 8BX/CA)

## (undated) DEVICE — TUBE E-T HI-LO CUFF 7.5MM (10EA/PK)

## (undated) DEVICE — SLEEVE VASO DVT COMPRESSION CALF MED - (10PR/CA)

## (undated) DEVICE — DRAPE IOBAN II INCISE 23X17 - (10EA/BX 4BX/CA)

## (undated) DEVICE — GOWN WARMING STANDARD FLEX - (30/CA)

## (undated) DEVICE — CATHETER IV 18 GA X 1-3/4 ---SURG.& SDS ONLY---

## (undated) DEVICE — PENCIL ELECTSURG 10FT BTN SWH - (50/CA)

## (undated) DEVICE — SUTURE 2-0 VICRYL PLUS CT-1 36 (36PK/BX)"

## (undated) DEVICE — BLADE SURGICAL #11 - (50/BX)

## (undated) DEVICE — SYSTEM PREVENA DRESSING CUSTOMIZABLE (5EA/CA)

## (undated) DEVICE — TUBE E-T HI-LO CUFF 8.0MM (10EA/PK)

## (undated) DEVICE — PAD PREP 24 X 48 CUFFED - (100/CA)

## (undated) DEVICE — DRESSING TRANSPARENT FILM TEGADERM 4 X 4.75" (50EA/BX)"

## (undated) DEVICE — SYRINGE ASEPTO - (50EA/CA

## (undated) DEVICE — BANDAGE ROLL STERILE BULKEE 4.5 IN X 4 YD (100EA/CA)

## (undated) DEVICE — STAPLER SKIN DISP - (6/BX 10BX/CA) VISISTAT

## (undated) DEVICE — TUBING CLEARLINK DUO-VENT - C-FLO (48EA/CA)

## (undated) DEVICE — GUIDEWIRES STARTER (PTFE COATED) ROSEN 0.035 180CM 4 1.5 MM J

## (undated) DEVICE — SUTURE 3-0 SILK 12 X 18 IN - (36/BX)

## (undated) DEVICE — VALVULOTOME LE MAITRE---MUST ORDER MINIMUM OF 10----

## (undated) DEVICE — PACK AV FISTULA (4EA/CA)

## (undated) DEVICE — DRAPE SURGICAL U 77X120 - (10/CA)

## (undated) DEVICE — DISH PETRI STERILE (50EA/CA)

## (undated) DEVICE — GUIDEWIRE HYDROPHILIC COATED STRAIGHT TIP GLIDEWIRE .035 X 180CM (5EA/BX)"

## (undated) DEVICE — DEVICE INFLATION DIGITAL BLUE DIAMOND (5EA/BX)

## (undated) DEVICE — BOVIE BLADE COATED - (50/PK)

## (undated) DEVICE — CATHETER RUBICON 35 65CM

## (undated) DEVICE — SPONGE RADIOPAQUE CTN X-LG - STERILE (50PK/CA) MADE TO ORDER ITEM AND HAS A 4-6 WEEK LEAD TIME

## (undated) DEVICE — CLIP MED INTNL HRZN TI ESCP - (25/BX)

## (undated) DEVICE — TRAY CATHETER FOLEY URINE METER W/STATLOCK 350ML (10EA/CA)

## (undated) DEVICE — LACTATED RINGERS INJ 1000 ML - (14EA/CA 60CA/PF)

## (undated) DEVICE — GELAQUASONIC 100 ULTRASOUND - 48/BX 20GM STERILE FOIL POUCH

## (undated) DEVICE — HEAD HOLDER JUNIOR/ADULT

## (undated) DEVICE — CATHETER EMBOLECTOMY 4FR (5EA/CA)

## (undated) DEVICE — SURGIFOAM (SIZE 100) - (6EA/CA)

## (undated) DEVICE — BAG RESUSCITATION DISPOSABLE - WITH MASK (10 EA/CA)

## (undated) DEVICE — SET EXTENSION WITH 2 PORTS (48EA/CA) ***PART #2C8610 IS A SUBSTITUTE*****

## (undated) DEVICE — SOD. CHL. INJ. 0.9% 1000 ML - (14EA/CA 60CA/PF)

## (undated) DEVICE — SHEATH RO 6F 6CM (10EA/BX)

## (undated) DEVICE — DERMABOND ADVANCED - (12EA/BX)

## (undated) DEVICE — GOWN SURGEONS LARGE - (32/CA)

## (undated) DEVICE — POUCH FLUID COLLECTION INVISISHIELD - (10/BX)

## (undated) DEVICE — SUTURE 6-0 PROLENE BV-1 D/A 24 (36PK/BX)"

## (undated) DEVICE — SHEATH RO 6F 10CM (10EA/BX)

## (undated) DEVICE — SODIUM CHL IRRIGATION 0.9% 1000ML (12EA/CA)

## (undated) DEVICE — TUBE NG SALEM SUMP 16FR (50EA/CA)

## (undated) DEVICE — GLOVE BIOGEL PI INDICATOR SZ 6.0 SURGICAL PF LF -(200PR/CA)

## (undated) DEVICE — PAD LAP STERILE 18 X 18 - (5/PK 40PK/CA)

## (undated) DEVICE — SUTURE 4-0 30CM STRATAFIX SPIRAL PS-2 (12EA/BX)

## (undated) DEVICE — GLOVE BIOGEL PI INDICATOR SZ 7.5 SURGICAL PF LF -(50/BX 4BX/CA)

## (undated) DEVICE — GLOVE BIOGEL SZ 7 SURGICAL PF LTX - (50PR/BX 4BX/CA)

## (undated) DEVICE — PEN SKIN MARKER W/RULER - (50EA/BX)

## (undated) DEVICE — GLOVE BIOGEL PI ORTHO SZ 6 SURGICAL PF LF (40PR/BX)

## (undated) DEVICE — CATHETER EMBOLECTOMY 3FR (1EA)

## (undated) DEVICE — SODIUM CHL. INJ. 0.9% 500ML (24EA/CA 50CA/PF)

## (undated) DEVICE — DRAPE SPLIT ORTHOMAX (6/CA) **NO ALLOCATION OR ALLOTMENT, ORDER #4601 AS A SUB***

## (undated) DEVICE — GLOVE BIOGEL SZ 7.5 SURGICAL PF LTX - (50PR/BX 4BX/CA)

## (undated) DEVICE — SUTURE 5-0 PROLENE C-1 D/A 24 (36PK/BX)"

## (undated) DEVICE — MASK ANESTHESIA ADULT  - (100/CA)

## (undated) DEVICE — SUTURE 6-0 PROLENE BV-1 D/A 30 (36PK/BX)"

## (undated) DEVICE — BLADE SURGICAL CLIPPER - (50EA/CA)

## (undated) DEVICE — CLIP MED INTNL HRZN TI ESCP - (25/BX) DISCONTINUED ORDER 21732

## (undated) DEVICE — SUTURE 5-0 PROLENE BLUE C-1 HS 1 X 30 (36EA/BX)"

## (undated) DEVICE — GLOVE SZ 7 BIOGEL PI MICRO - PF LF (50PR/BX 4BX/CA)

## (undated) DEVICE — TOWEL STOP TIMEOUT SAFETY FLAG (40EA/CA)

## (undated) DEVICE — BLADE SURGICAL #10 - (50/BX)

## (undated) DEVICE — GUIDEWIRE 25CM 0.35 260CM ANGLED GLIDEWIRE ADVANTAGE (1/BX)

## (undated) DEVICE — WATER IRRIGATION STERILE 1000ML (12EA/CA)

## (undated) DEVICE — SET LEADWIRE 5 LEAD BEDSIDE DISPOSABLE ECG (1SET OF 5/EA)

## (undated) DEVICE — GAUZE PACKING STRIP STERILE IODOFORM 1/2 IN X 5 YDS

## (undated) DEVICE — SUTURE 2-0 SILK 12 X 18" (36PK/BX)"

## (undated) DEVICE — GLOVE BIOGEL SZ 6.5 SURGICAL PF LTX (50PR/BX 4BX/CA)

## (undated) DEVICE — Device

## (undated) DEVICE — DRAPE C-ARM LARGE 41IN X 74 IN - (10/BX 2BX/CA)

## (undated) DEVICE — CLIP SM INTNL HRZN TI ESCP LGT - (24EA/PK 25PK/BX)

## (undated) DEVICE — TUBE CONNECT SUCTION CLEAR 120 X 1/4" (50EA/CA)"

## (undated) DEVICE — DRAPE SURG STERI-DRAPE 7X11OD - (40EA/CA)

## (undated) DEVICE — SUTURE GOR-TEX CV-6 TT-9 (36PK/BX)

## (undated) DEVICE — PACK AV FISTULA (2EA/CA)

## (undated) DEVICE — POLY UMBILICAL TAPE 1/8X30 - (36/BX)

## (undated) DEVICE — KIT ANESTHESIA W/CIRCUIT & 3/LT BAG W/FILTER (20EA/CA)

## (undated) DEVICE — DRAPE VAGINAL BIB W/ POUCH (10EA/CA)

## (undated) DEVICE — CATHETER ANGIO OMNI FLUSH 4FR 65CM - (10/BX)

## (undated) DEVICE — SURGIFOAM (12X7) - (12EA/CA)

## (undated) DEVICE — GLOVE SZ 7.5 BIOGEL PI MICRO - PF LF (50PR/BX)

## (undated) DEVICE — PACK MINOR BASIN - (2EA/CA)

## (undated) DEVICE — GLOVE BIOGEL SZ 8 SURGICAL PF LTX - (50PR/BX 4BX/CA)

## (undated) DEVICE — CANNULA O2 COMFORT SOFT EAR ADULT 7 FT TUBING (50/CA)

## (undated) DEVICE — SUTURE 4-0 PROLENE SH 36 (36PK/BX)"

## (undated) DEVICE — DRAPE U ORTHOPEDIC - (10/BX)

## (undated) DEVICE — GLOVE SZ 6 BIOGEL PI MICRO - PF LF (50PR/BX 4BX/CA)

## (undated) DEVICE — NEEDLE NON SAFETY HYPO 22 GA X 1 1/2 IN (100/BX)

## (undated) DEVICE — BOWL